# Patient Record
Sex: MALE | Race: WHITE | Employment: UNEMPLOYED | URBAN - METROPOLITAN AREA
[De-identification: names, ages, dates, MRNs, and addresses within clinical notes are randomized per-mention and may not be internally consistent; named-entity substitution may affect disease eponyms.]

---

## 2023-07-21 ENCOUNTER — OFFICE VISIT (OUTPATIENT)
Dept: PRIMARY CARE CLINIC | Age: 73
End: 2023-07-21
Payer: MEDICARE

## 2023-07-21 VITALS
RESPIRATION RATE: 16 BRPM | WEIGHT: 238.4 LBS | DIASTOLIC BLOOD PRESSURE: 72 MMHG | SYSTOLIC BLOOD PRESSURE: 124 MMHG | BODY MASS INDEX: 34.13 KG/M2 | HEIGHT: 70 IN | OXYGEN SATURATION: 97 % | HEART RATE: 68 BPM

## 2023-07-21 DIAGNOSIS — Z79.4 TYPE 2 DIABETES MELLITUS WITH STAGE 3B CHRONIC KIDNEY DISEASE, WITH LONG-TERM CURRENT USE OF INSULIN (HCC): Primary | ICD-10-CM

## 2023-07-21 DIAGNOSIS — G47.33 OBSTRUCTIVE SLEEP APNEA SYNDROME: ICD-10-CM

## 2023-07-21 DIAGNOSIS — E11.22 TYPE 2 DIABETES MELLITUS WITH STAGE 3B CHRONIC KIDNEY DISEASE, WITH LONG-TERM CURRENT USE OF INSULIN (HCC): Primary | ICD-10-CM

## 2023-07-21 DIAGNOSIS — I10 PRIMARY HYPERTENSION: ICD-10-CM

## 2023-07-21 DIAGNOSIS — J44.9 CHRONIC OBSTRUCTIVE PULMONARY DISEASE, UNSPECIFIED COPD TYPE (HCC): ICD-10-CM

## 2023-07-21 DIAGNOSIS — N18.32 TYPE 2 DIABETES MELLITUS WITH STAGE 3B CHRONIC KIDNEY DISEASE, WITH LONG-TERM CURRENT USE OF INSULIN (HCC): Primary | ICD-10-CM

## 2023-07-21 DIAGNOSIS — N18.31 STAGE 3A CHRONIC KIDNEY DISEASE (HCC): ICD-10-CM

## 2023-07-21 PROBLEM — H53.9 VISION DISORDER: Status: ACTIVE | Noted: 2023-07-21

## 2023-07-21 PROBLEM — I82.409 DEEP VEIN THROMBOSIS OF LOWER EXTREMITY (HCC): Status: RESOLVED | Noted: 2023-07-21 | Resolved: 2023-07-21

## 2023-07-21 PROBLEM — I82.409 DEEP VEIN THROMBOSIS OF LOWER EXTREMITY (HCC): Status: ACTIVE | Noted: 2023-07-21

## 2023-07-21 PROBLEM — E11.9 TYPE 2 DIABETES MELLITUS (HCC): Status: ACTIVE | Noted: 2023-07-21

## 2023-07-21 PROBLEM — M10.9 GOUT: Status: ACTIVE | Noted: 2023-07-21

## 2023-07-21 PROBLEM — M81.8 IDIOPATHIC OSTEOPOROSIS: Status: ACTIVE | Noted: 2017-06-14

## 2023-07-21 PROBLEM — F41.1 GENERALIZED ANXIETY DISORDER: Status: ACTIVE | Noted: 2023-07-21

## 2023-07-21 PROBLEM — E78.6 LIPOPROTEIN DEFICIENCY DISORDER: Status: ACTIVE | Noted: 2023-07-21

## 2023-07-21 PROBLEM — R97.20 HIGH PROSTATE SPECIFIC ANTIGEN (PSA): Status: ACTIVE | Noted: 2023-07-21

## 2023-07-21 PROBLEM — E66.9 OBESITY: Status: ACTIVE | Noted: 2023-07-21

## 2023-07-21 PROBLEM — R68.89 INTOLERANT OF HEAT: Status: ACTIVE | Noted: 2023-07-21

## 2023-07-21 PROBLEM — I26.99 PULMONARY EMBOLISM (HCC): Status: ACTIVE | Noted: 2023-07-21

## 2023-07-21 PROBLEM — M54.9 CHRONIC BACK PAIN: Status: ACTIVE | Noted: 2023-07-21

## 2023-07-21 PROBLEM — G89.29 CHRONIC BACK PAIN: Status: ACTIVE | Noted: 2023-07-21

## 2023-07-21 PROBLEM — M19.90 ARTHRITIS: Status: ACTIVE | Noted: 2023-07-21

## 2023-07-21 PROBLEM — F43.22 ADJUSTMENT DISORDER WITH ANXIOUS MOOD: Status: ACTIVE | Noted: 2023-07-21

## 2023-07-21 PROBLEM — I26.99 PULMONARY EMBOLISM (HCC): Status: RESOLVED | Noted: 2023-07-21 | Resolved: 2023-07-21

## 2023-07-21 PROBLEM — R80.9 PROTEINURIA: Status: ACTIVE | Noted: 2023-07-21

## 2023-07-21 PROBLEM — E78.1 HYPERTRIGLYCERIDEMIA: Status: ACTIVE | Noted: 2023-07-21

## 2023-07-21 PROBLEM — R91.8 ABNORMAL FINDINGS ON DIAGNOSTIC IMAGING OF LUNG: Status: ACTIVE | Noted: 2023-07-21

## 2023-07-21 PROCEDURE — G8427 DOCREV CUR MEDS BY ELIG CLIN: HCPCS | Performed by: PHYSICIAN ASSISTANT

## 2023-07-21 PROCEDURE — 99204 OFFICE O/P NEW MOD 45 MIN: CPT | Performed by: PHYSICIAN ASSISTANT

## 2023-07-21 PROCEDURE — G8417 CALC BMI ABV UP PARAM F/U: HCPCS | Performed by: PHYSICIAN ASSISTANT

## 2023-07-21 PROCEDURE — 1123F ACP DISCUSS/DSCN MKR DOCD: CPT | Performed by: PHYSICIAN ASSISTANT

## 2023-07-21 PROCEDURE — 3023F SPIROM DOC REV: CPT | Performed by: PHYSICIAN ASSISTANT

## 2023-07-21 PROCEDURE — 3046F HEMOGLOBIN A1C LEVEL >9.0%: CPT | Performed by: PHYSICIAN ASSISTANT

## 2023-07-21 PROCEDURE — 3074F SYST BP LT 130 MM HG: CPT | Performed by: PHYSICIAN ASSISTANT

## 2023-07-21 PROCEDURE — 3017F COLORECTAL CA SCREEN DOC REV: CPT | Performed by: PHYSICIAN ASSISTANT

## 2023-07-21 PROCEDURE — 3078F DIAST BP <80 MM HG: CPT | Performed by: PHYSICIAN ASSISTANT

## 2023-07-21 PROCEDURE — 2022F DILAT RTA XM EVC RTNOPTHY: CPT | Performed by: PHYSICIAN ASSISTANT

## 2023-07-21 PROCEDURE — 1036F TOBACCO NON-USER: CPT | Performed by: PHYSICIAN ASSISTANT

## 2023-07-21 RX ORDER — IRBESARTAN 300 MG/1
300 TABLET ORAL NIGHTLY
COMMUNITY

## 2023-07-21 RX ORDER — ACARBOSE 25 MG/1
50 TABLET ORAL
COMMUNITY

## 2023-07-21 RX ORDER — IRBESARTAN 300 MG/1
300 TABLET ORAL
COMMUNITY
Start: 2021-03-23 | End: 2023-07-21

## 2023-07-21 RX ORDER — INSULIN LISPRO 100 [IU]/ML
INJECTION, SOLUTION INTRAVENOUS; SUBCUTANEOUS
COMMUNITY
Start: 2021-03-23

## 2023-07-21 RX ORDER — ATORVASTATIN CALCIUM 10 MG/1
10 TABLET, FILM COATED ORAL
COMMUNITY
Start: 2018-01-29

## 2023-07-21 SDOH — ECONOMIC STABILITY: HOUSING INSECURITY
IN THE LAST 12 MONTHS, WAS THERE A TIME WHEN YOU DID NOT HAVE A STEADY PLACE TO SLEEP OR SLEPT IN A SHELTER (INCLUDING NOW)?: NO

## 2023-07-21 SDOH — ECONOMIC STABILITY: INCOME INSECURITY: HOW HARD IS IT FOR YOU TO PAY FOR THE VERY BASICS LIKE FOOD, HOUSING, MEDICAL CARE, AND HEATING?: NOT HARD AT ALL

## 2023-07-21 SDOH — ECONOMIC STABILITY: FOOD INSECURITY: WITHIN THE PAST 12 MONTHS, THE FOOD YOU BOUGHT JUST DIDN'T LAST AND YOU DIDN'T HAVE MONEY TO GET MORE.: NEVER TRUE

## 2023-07-21 SDOH — ECONOMIC STABILITY: FOOD INSECURITY: WITHIN THE PAST 12 MONTHS, YOU WORRIED THAT YOUR FOOD WOULD RUN OUT BEFORE YOU GOT MONEY TO BUY MORE.: NEVER TRUE

## 2023-07-21 ASSESSMENT — PATIENT HEALTH QUESTIONNAIRE - PHQ9
2. FEELING DOWN, DEPRESSED OR HOPELESS: 0
SUM OF ALL RESPONSES TO PHQ QUESTIONS 1-9: 0
SUM OF ALL RESPONSES TO PHQ QUESTIONS 1-9: 0
1. LITTLE INTEREST OR PLEASURE IN DOING THINGS: 0
SUM OF ALL RESPONSES TO PHQ QUESTIONS 1-9: 0
SUM OF ALL RESPONSES TO PHQ QUESTIONS 1-9: 0
SUM OF ALL RESPONSES TO PHQ9 QUESTIONS 1 & 2: 0

## 2023-07-21 NOTE — PROGRESS NOTES
[address] [facility] PRIMARY CARE  [address] [place] [address]  Dept: [phone]  Dept Fax: [phone]    Samuel Muonz is a 74 y.o. male who presents today for his medical conditions/complaints as noted below. Chief Complaint   Patient presents with    Establish Care     History of gallbladder removal, history of kidney donation, having back pain that causes pain in arms when moving them; he is seeing Dr. Angela Olvera for sleep medicine, seeing Dr. Gianna Winslow        HPI:     Patient presents to the office to establish care. Prior patient at [facility]. He has past medical history including type 2 diabetes, chronic kidney disease, kidney donation, hypertension, LAWANDA, COPD, chronic back pain. Patient reports he is currently using 60 units of Levemir daily with 20 units of Humalog per meal.  Denies any hypoglycemic events. No lightheadedness or dizziness. For the most part sugars in the 100s or 200s. He is also taking glimepiride and acarbose. He has seen endocrinology in the past and would like to hold off on going back. Patient does follow with pulmonology for management of COPD. He is not currently on any inhalers. Denies any breathing issues at this time. Patient has chronic obstructive sleep apnea. He is compliant with CPAP device. He follows with Dr. Danielle Shaw. He does mention intermittent back pain throughout his life. Not currently bothering him enough to the point where he would like physical therapy or pain management. He is managing with over-the-counter remedies. No other concerns or complaints. BP stable.       No results found for: LABA1C          ( goal A1C is < 7)   No components found for: LABMICR  No results found for: LDLCHOLESTEROL, LDLCALC    (goal LDL is <100)   No results found for: AST, ALT, BUN, CR  BP Readings from Last 3 Encounters:   07/21/23 124/72   04/06/16 151/75   06/30/14 120/76          (goal 120/80)    Past

## 2023-08-01 ASSESSMENT — ENCOUNTER SYMPTOMS
BACK PAIN: 0
EYE PAIN: 0
VOMITING: 0
NAUSEA: 0
ABDOMINAL PAIN: 0
CONSTIPATION: 0
RHINORRHEA: 0
SHORTNESS OF BREATH: 0
DIARRHEA: 0
COUGH: 0
SINUS PAIN: 0

## 2023-09-01 ENCOUNTER — TELEPHONE (OUTPATIENT)
Dept: PRIMARY CARE CLINIC | Age: 73
End: 2023-09-01

## 2023-09-01 NOTE — TELEPHONE ENCOUNTER
Patient called in this afternoon inquiring on why it say on his paper work that he has stage 3 kidney disease. Please advise.

## 2023-09-01 NOTE — TELEPHONE ENCOUNTER
----- Message from Calebadarsh Munoz sent at 9/1/2023  3:03 PM EDT -----  Subject: Message to Provider    QUESTIONS  Information for Provider? Patient wants to know if he needs to fast for   bloodwork. When can he get his blood drawn? He also has questions about   his paperwork from his last visit about his kidneys. ---------------------------------------------------------------------------  --------------  Irma HENNESSY  [phone]; OK to leave message on voicemail  ---------------------------------------------------------------------------  --------------  SCRIPT ANSWERS  Relationship to Patient?  Self

## 2023-09-01 NOTE — TELEPHONE ENCOUNTER
His last creatinine was elevated. For his labs please make sure he is fasting. This will check his kidneys and see if there is any decrease in function.

## 2023-09-01 NOTE — TELEPHONE ENCOUNTER
----- Message from Bonny Joy sent at 9/1/2023  3:03 PM EDT -----  Subject: Message to Provider    QUESTIONS  Information for Provider? Patient wants to know if he needs to fast for   bloodwork. When can he get his blood drawn? He also has questions about   his paperwork from his last visit about his kidneys. ---------------------------------------------------------------------------  --------------  Dora RODRIGUEZZL  [phone]; OK to leave message on voicemail  ---------------------------------------------------------------------------  --------------  SCRIPT ANSWERS  Relationship to Patient?  Self

## 2023-09-01 NOTE — TELEPHONE ENCOUNTER
Patient did call back. Was able to answer some of his questions.  But what couldn't was sent to [facility] for answering

## 2023-09-01 NOTE — TELEPHONE ENCOUNTER
Pt notified by phone and thought his specialist was watching this and never heard was bad for the last time he had it checked

## 2023-10-09 ENCOUNTER — OFFICE VISIT (OUTPATIENT)
Dept: PRIMARY CARE CLINIC | Age: 73
End: 2023-10-09
Payer: MEDICARE

## 2023-10-09 VITALS
HEART RATE: 98 BPM | WEIGHT: 240.6 LBS | OXYGEN SATURATION: 97 % | SYSTOLIC BLOOD PRESSURE: 138 MMHG | DIASTOLIC BLOOD PRESSURE: 88 MMHG | BODY MASS INDEX: 34.44 KG/M2 | HEIGHT: 70 IN

## 2023-10-09 DIAGNOSIS — L89.899 PRESSURE INJURY OF SKIN OF LEFT FOOT, UNSPECIFIED INJURY STAGE: Primary | ICD-10-CM

## 2023-10-09 DIAGNOSIS — Z79.4 TYPE 2 DIABETES MELLITUS WITH STAGE 3B CHRONIC KIDNEY DISEASE, WITH LONG-TERM CURRENT USE OF INSULIN (HCC): ICD-10-CM

## 2023-10-09 DIAGNOSIS — L03.032 CELLULITIS OF TOE OF LEFT FOOT: ICD-10-CM

## 2023-10-09 DIAGNOSIS — N18.32 TYPE 2 DIABETES MELLITUS WITH STAGE 3B CHRONIC KIDNEY DISEASE, WITH LONG-TERM CURRENT USE OF INSULIN (HCC): ICD-10-CM

## 2023-10-09 DIAGNOSIS — E11.22 TYPE 2 DIABETES MELLITUS WITH STAGE 3B CHRONIC KIDNEY DISEASE, WITH LONG-TERM CURRENT USE OF INSULIN (HCC): ICD-10-CM

## 2023-10-09 LAB — HBA1C MFR BLD: 8 %

## 2023-10-09 PROCEDURE — G8484 FLU IMMUNIZE NO ADMIN: HCPCS | Performed by: FAMILY MEDICINE

## 2023-10-09 PROCEDURE — 3052F HG A1C>EQUAL 8.0%<EQUAL 9.0%: CPT | Performed by: FAMILY MEDICINE

## 2023-10-09 PROCEDURE — 10060 I&D ABSCESS SIMPLE/SINGLE: CPT | Performed by: FAMILY MEDICINE

## 2023-10-09 PROCEDURE — 2022F DILAT RTA XM EVC RTNOPTHY: CPT | Performed by: FAMILY MEDICINE

## 2023-10-09 PROCEDURE — G8427 DOCREV CUR MEDS BY ELIG CLIN: HCPCS | Performed by: FAMILY MEDICINE

## 2023-10-09 PROCEDURE — 1123F ACP DISCUSS/DSCN MKR DOCD: CPT | Performed by: FAMILY MEDICINE

## 2023-10-09 PROCEDURE — G8417 CALC BMI ABV UP PARAM F/U: HCPCS | Performed by: FAMILY MEDICINE

## 2023-10-09 PROCEDURE — 1036F TOBACCO NON-USER: CPT | Performed by: FAMILY MEDICINE

## 2023-10-09 PROCEDURE — 3075F SYST BP GE 130 - 139MM HG: CPT | Performed by: FAMILY MEDICINE

## 2023-10-09 PROCEDURE — 99204 OFFICE O/P NEW MOD 45 MIN: CPT | Performed by: FAMILY MEDICINE

## 2023-10-09 PROCEDURE — 3079F DIAST BP 80-89 MM HG: CPT | Performed by: FAMILY MEDICINE

## 2023-10-09 PROCEDURE — 3017F COLORECTAL CA SCREEN DOC REV: CPT | Performed by: FAMILY MEDICINE

## 2023-10-09 PROCEDURE — 83036 HEMOGLOBIN GLYCOSYLATED A1C: CPT | Performed by: FAMILY MEDICINE

## 2023-10-09 RX ORDER — AMOXICILLIN AND CLAVULANATE POTASSIUM 875; 125 MG/1; MG/1
1 TABLET, FILM COATED ORAL 2 TIMES DAILY
Qty: 14 TABLET | Refills: 0 | Status: SHIPPED | OUTPATIENT
Start: 2023-10-09 | End: 2023-10-16

## 2023-10-09 RX ORDER — ACARBOSE 50 MG/1
TABLET ORAL
COMMUNITY
Start: 2023-08-15

## 2023-10-09 ASSESSMENT — PATIENT HEALTH QUESTIONNAIRE - PHQ9
SUM OF ALL RESPONSES TO PHQ QUESTIONS 1-9: 0
SUM OF ALL RESPONSES TO PHQ QUESTIONS 1-9: 0
1. LITTLE INTEREST OR PLEASURE IN DOING THINGS: 0
SUM OF ALL RESPONSES TO PHQ QUESTIONS 1-9: 0
SUM OF ALL RESPONSES TO PHQ9 QUESTIONS 1 & 2: 0
SUM OF ALL RESPONSES TO PHQ QUESTIONS 1-9: 0
2. FEELING DOWN, DEPRESSED OR HOPELESS: 0

## 2023-10-09 ASSESSMENT — ENCOUNTER SYMPTOMS
SORE THROAT: 0
SHORTNESS OF BREATH: 0
ABDOMINAL PAIN: 0
COLOR CHANGE: 1
WHEEZING: 0

## 2023-10-09 NOTE — PATIENT INSTRUCTIONS
Soak your feet in vibrating foot bath ideally warm soapy water twice a day pat dry apply the antibiotic ointment that I gave you. Keep the wound covered with sterile bandage that we gave you as well. Take the antibiotic until it is totally empty    I will call you with results of the x-ray    I want you to follow-up next Monday with me in the office for recheck     Recommend diabetic shoes.   Be very careful of your foot wear

## 2023-10-09 NOTE — PROGRESS NOTES
Subjective:     Patient ID: Cadence Vegas is a 74 y.o. male    HPI  Patient presents today for blister noticed on his left foot fifth toe. Swollen gotten darker and more noticeable. No known injury to the foot. Has no pain associated with it. Is a known diabetic has not had a recent A1c. Not aware of any diabetic neuropathy or loss of foot sensation in the past.  Has been wearing the same shoes has not increased his activity recently. Never had a foot ulcer before. Reviewed his med list and has been compliant. He had previously seen following [facility] and reports that his A1c's have been in the eights in the past.  He has been on insulin and glimepiride. Oli Carr saw him here to establish as a new patient and the only labs that he has not had done yet. Review of Systems   Constitutional:  Negative for fever. HENT:  Negative for congestion, ear pain and sore throat. Respiratory:  Negative for shortness of breath and wheezing. Cardiovascular:  Negative for chest pain and leg swelling. Gastrointestinal:  Negative for abdominal pain. Genitourinary:  Negative for dysuria. Skin:  Positive for color change, rash and wound. Neurological:  Negative for syncope. Hematological:  Negative for adenopathy. Psychiatric/Behavioral:  Negative for sleep disturbance. The patient is not nervous/anxious. Objective:     Physical Exam  Vitals and nursing note reviewed. Exam conducted with a chaperone present. Constitutional:       General: He is not in acute distress. Appearance: Normal appearance. He is obese. He is not ill-appearing, toxic-appearing or diaphoretic. Musculoskeletal:        Feet:    Feet:      Comments: Cystic lesion on the left fifth toe. Appears to be fluid-filled. Not tender to touch. Flesh-colored. Underneath this lesion appears to have old healed diabetic ulcer. Rest of the skin on the foot looks good.     Palpation of the area does not produce any

## 2023-10-12 ENCOUNTER — OFFICE VISIT (OUTPATIENT)
Dept: PRIMARY CARE CLINIC | Age: 73
End: 2023-10-12
Payer: MEDICARE

## 2023-10-12 VITALS
BODY MASS INDEX: 34.07 KG/M2 | TEMPERATURE: 97.5 F | DIASTOLIC BLOOD PRESSURE: 60 MMHG | WEIGHT: 238 LBS | HEIGHT: 70 IN | HEART RATE: 101 BPM | OXYGEN SATURATION: 97 % | SYSTOLIC BLOOD PRESSURE: 124 MMHG

## 2023-10-12 DIAGNOSIS — L03.032 CELLULITIS OF TOE OF LEFT FOOT: ICD-10-CM

## 2023-10-12 DIAGNOSIS — L89.899 PRESSURE INJURY OF SKIN OF LEFT FOOT, UNSPECIFIED INJURY STAGE: Primary | ICD-10-CM

## 2023-10-12 PROCEDURE — G8417 CALC BMI ABV UP PARAM F/U: HCPCS | Performed by: NURSE PRACTITIONER

## 2023-10-12 PROCEDURE — 1123F ACP DISCUSS/DSCN MKR DOCD: CPT | Performed by: NURSE PRACTITIONER

## 2023-10-12 PROCEDURE — 3078F DIAST BP <80 MM HG: CPT | Performed by: NURSE PRACTITIONER

## 2023-10-12 PROCEDURE — 3074F SYST BP LT 130 MM HG: CPT | Performed by: NURSE PRACTITIONER

## 2023-10-12 PROCEDURE — 1036F TOBACCO NON-USER: CPT | Performed by: NURSE PRACTITIONER

## 2023-10-12 PROCEDURE — 99213 OFFICE O/P EST LOW 20 MIN: CPT | Performed by: NURSE PRACTITIONER

## 2023-10-12 PROCEDURE — 3017F COLORECTAL CA SCREEN DOC REV: CPT | Performed by: NURSE PRACTITIONER

## 2023-10-12 PROCEDURE — G8427 DOCREV CUR MEDS BY ELIG CLIN: HCPCS | Performed by: NURSE PRACTITIONER

## 2023-10-12 PROCEDURE — G8484 FLU IMMUNIZE NO ADMIN: HCPCS | Performed by: NURSE PRACTITIONER

## 2023-10-12 ASSESSMENT — ENCOUNTER SYMPTOMS
COUGH: 0
SHORTNESS OF BREATH: 0
VOMITING: 0
ABDOMINAL PAIN: 0

## 2023-10-12 NOTE — PROGRESS NOTES
effort is normal. No respiratory distress. Musculoskeletal:      Left foot: Bunion present. Feet:    Feet:      Left foot:      Skin integrity: Skin breakdown, callus and dry skin present. No warmth. Skin:     General: Skin is warm and dry. Neurological:      Mental Status: He is alert and oriented to person, place, and time. Psychiatric:         Mood and Affect: Mood normal.         Behavior: Behavior normal.           :          1. Pressure injury of skin of left foot, unspecified injury stage  2. Cellulitis of toe of left foot       Area does not look infected today. Estephania Johnson MA was present for exam and procedure on Monday with Dr. Rafat Byrne. She thinks patients foot looks better today than on Monday. Will continue to monitor. Triple antibiotic ointment and non-adherent dressing with coban applied to his left foot. He tolerated well. Continue topical and oral antibiotics as prescribed. Follow up on Monday as planned. Call office with concerns.   :      Return if symptoms worsen or fail to improve. No orders of the defined types were placed in this encounter. Patient and/or parent given educational materials - see patient instructions. Discussed use, benefit, and side effects of prescribed medications. All patient questions answered. Patient and/or parent voiced understanding.       Electronically signed by JAGJIT Flores 10/12/2023 at 11:36 AM

## 2023-10-16 ENCOUNTER — OFFICE VISIT (OUTPATIENT)
Dept: PRIMARY CARE CLINIC | Age: 73
End: 2023-10-16
Payer: MEDICARE

## 2023-10-16 VITALS
HEIGHT: 70 IN | SYSTOLIC BLOOD PRESSURE: 124 MMHG | HEART RATE: 102 BPM | WEIGHT: 238 LBS | BODY MASS INDEX: 34.07 KG/M2 | DIASTOLIC BLOOD PRESSURE: 68 MMHG | OXYGEN SATURATION: 98 %

## 2023-10-16 DIAGNOSIS — L03.032 CELLULITIS OF TOE OF LEFT FOOT: Primary | ICD-10-CM

## 2023-10-16 DIAGNOSIS — E11.22 TYPE 2 DIABETES MELLITUS WITH STAGE 3B CHRONIC KIDNEY DISEASE, WITH LONG-TERM CURRENT USE OF INSULIN (HCC): ICD-10-CM

## 2023-10-16 DIAGNOSIS — L89.899 PRESSURE INJURY OF SKIN OF LEFT FOOT, UNSPECIFIED INJURY STAGE: ICD-10-CM

## 2023-10-16 DIAGNOSIS — Z79.4 TYPE 2 DIABETES MELLITUS WITH STAGE 3B CHRONIC KIDNEY DISEASE, WITH LONG-TERM CURRENT USE OF INSULIN (HCC): ICD-10-CM

## 2023-10-16 DIAGNOSIS — N18.32 TYPE 2 DIABETES MELLITUS WITH STAGE 3B CHRONIC KIDNEY DISEASE, WITH LONG-TERM CURRENT USE OF INSULIN (HCC): ICD-10-CM

## 2023-10-16 PROCEDURE — 3017F COLORECTAL CA SCREEN DOC REV: CPT | Performed by: FAMILY MEDICINE

## 2023-10-16 PROCEDURE — G8417 CALC BMI ABV UP PARAM F/U: HCPCS | Performed by: FAMILY MEDICINE

## 2023-10-16 PROCEDURE — 2022F DILAT RTA XM EVC RTNOPTHY: CPT | Performed by: FAMILY MEDICINE

## 2023-10-16 PROCEDURE — G8484 FLU IMMUNIZE NO ADMIN: HCPCS | Performed by: FAMILY MEDICINE

## 2023-10-16 PROCEDURE — G8427 DOCREV CUR MEDS BY ELIG CLIN: HCPCS | Performed by: FAMILY MEDICINE

## 2023-10-16 PROCEDURE — 3074F SYST BP LT 130 MM HG: CPT | Performed by: FAMILY MEDICINE

## 2023-10-16 PROCEDURE — 99213 OFFICE O/P EST LOW 20 MIN: CPT | Performed by: FAMILY MEDICINE

## 2023-10-16 PROCEDURE — 3078F DIAST BP <80 MM HG: CPT | Performed by: FAMILY MEDICINE

## 2023-10-16 PROCEDURE — 1036F TOBACCO NON-USER: CPT | Performed by: FAMILY MEDICINE

## 2023-10-16 PROCEDURE — 1123F ACP DISCUSS/DSCN MKR DOCD: CPT | Performed by: FAMILY MEDICINE

## 2023-10-16 PROCEDURE — 3052F HG A1C>EQUAL 8.0%<EQUAL 9.0%: CPT | Performed by: FAMILY MEDICINE

## 2023-10-16 ASSESSMENT — ENCOUNTER SYMPTOMS
WHEEZING: 0
SHORTNESS OF BREATH: 0
SORE THROAT: 0
COLOR CHANGE: 1
ABDOMINAL PAIN: 0

## 2023-10-16 NOTE — PROGRESS NOTES
diabetic ulcer. Rest of the skin on the foot looks good. Palpation of the area does not produce any pain whatsoever. Cannot really tell if he has any bony involvement of infection. Lymphadenopathy:      Cervical: No cervical adenopathy. Skin:     Comments: Pressure ulcer diabetic with hyperkeratotic reaction left fifth toe. Also has a similar lesion on the right fifth toe but not as pronounced and smaller. Area is really nontender  No foul discharge at this point in time    Refer to podiatry for debridement   Neurological:      Mental Status: He is alert. Xray Result (most recent):  XR FOOT LEFT (MIN 3 VIEWS) 10/09/2023    Narrative  EXAMINATION:  THREE XRAY VIEWS OF THE LEFT FOOT    10/9/2023 12:24 pm    COMPARISON:  None. HISTORY:  ORDERING SYSTEM PROVIDED HISTORY: Pressure injury of skin of left foot,  unspecified injury stage  TECHNOLOGIST PROVIDED HISTORY:  Reason for Exam: Pt has a blister on foot that is concerning, is a diabetic. FINDINGS:  No acute osseous injury is identified. There is focal soft tissue swelling  lateral to the 5th MTP joint with suggestion of soft tissue gas, suspicious  for infectious process. 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 The remaining visible joint spaces appear normal.  Plantar  calcaneal enthesophytes are noted. Impression  There is focal soft tissue swelling lateral to the 5th MTP joint with  suggestion of soft tissue gas, suspicious for infectious process, without  evidence of osteomyelitis. Assessment/Plan:     1. Cellulitis of toe of left foot    2. Pressure injury of skin of left foot, unspecified injury stage    3. Type 2 diabetes mellitus with stage 3b chronic kidney disease, with long-term current use of insulin ([facility])          David Recio was seen today for foot ulcer.     Diagnoses and all orders for this visit:    Cellulitis of toe of left foot  -     Terry Nino DPM, Podiatry, [place]    Pressure

## 2023-10-23 ENCOUNTER — OFFICE VISIT (OUTPATIENT)
Dept: PODIATRY | Age: 73
End: 2023-10-23
Payer: MEDICARE

## 2023-10-23 VITALS — HEIGHT: 70 IN | WEIGHT: 238 LBS | BODY MASS INDEX: 34.07 KG/M2

## 2023-10-23 DIAGNOSIS — L97.523 ULCER OF LEFT FOOT, WITH NECROSIS OF MUSCLE (HCC): Primary | ICD-10-CM

## 2023-10-23 DIAGNOSIS — L97.509 DIABETIC FOOT ULCER WITH OSTEOMYELITIS (HCC): ICD-10-CM

## 2023-10-23 DIAGNOSIS — E11.621 DIABETIC FOOT ULCER WITH OSTEOMYELITIS (HCC): ICD-10-CM

## 2023-10-23 DIAGNOSIS — E11.69 DIABETIC FOOT ULCER WITH OSTEOMYELITIS (HCC): ICD-10-CM

## 2023-10-23 DIAGNOSIS — M86.9 DIABETIC FOOT ULCER WITH OSTEOMYELITIS (HCC): ICD-10-CM

## 2023-10-23 DIAGNOSIS — M79.671 PAIN IN BOTH FEET: ICD-10-CM

## 2023-10-23 DIAGNOSIS — I73.9 PVD (PERIPHERAL VASCULAR DISEASE) (HCC): ICD-10-CM

## 2023-10-23 DIAGNOSIS — M79.672 PAIN IN BOTH FEET: ICD-10-CM

## 2023-10-23 PROCEDURE — G8484 FLU IMMUNIZE NO ADMIN: HCPCS | Performed by: PODIATRIST

## 2023-10-23 PROCEDURE — 11043 DBRDMT MUSC&/FSCA 1ST 20/<: CPT | Performed by: PODIATRIST

## 2023-10-23 PROCEDURE — 1123F ACP DISCUSS/DSCN MKR DOCD: CPT | Performed by: PODIATRIST

## 2023-10-23 PROCEDURE — 1036F TOBACCO NON-USER: CPT | Performed by: PODIATRIST

## 2023-10-23 PROCEDURE — 3017F COLORECTAL CA SCREEN DOC REV: CPT | Performed by: PODIATRIST

## 2023-10-23 PROCEDURE — G8417 CALC BMI ABV UP PARAM F/U: HCPCS | Performed by: PODIATRIST

## 2023-10-23 PROCEDURE — 2022F DILAT RTA XM EVC RTNOPTHY: CPT | Performed by: PODIATRIST

## 2023-10-23 PROCEDURE — 3052F HG A1C>EQUAL 8.0%<EQUAL 9.0%: CPT | Performed by: PODIATRIST

## 2023-10-23 PROCEDURE — G8427 DOCREV CUR MEDS BY ELIG CLIN: HCPCS | Performed by: PODIATRIST

## 2023-10-23 PROCEDURE — 99204 OFFICE O/P NEW MOD 45 MIN: CPT | Performed by: PODIATRIST

## 2023-10-23 NOTE — PROGRESS NOTES
[address] [facility]  Dept: [phone]    NEW PATIENT PROGRESS NOTE  Date of patient's visit: 10/23/2023  Patient's Name:  Edda Flaherty YOB: 1950            Patient Care Team:  Kar Drew as PCP - General (Physician Assistant)  Kar Drew as PCP - Empaneled Provider        Chief Complaint   Patient presents with    New Patient     Establish care      Diabetes     Diabetic foot care    Wound Check     Left foot x 2 weeks. Pt states it started out as a blister         HPI:   Edda Flaherty is a 74 y.o. male who presents to the office today complaining of wound check left foot. Symptoms began 2 week(s) ago. Patient relates pain is Absent . Pain is rated 2 out of 10 and is described as none. Treatments prior to today's visit include: x-rays, follow up with pcp. Currently denies F/C/N/V. Pt's primary care physician is Aurelia Ruff last seen October 16 2023     No Known Allergies    Past Medical History:   Diagnosis Date    BOOP (bronchiolitis obliterans with organizing pneumonia) ([address]) 04/2014    SUSPECTED CAUSE GROUND MOLD OR POLLEN SPORES, O2  AS NEEDED AT HOME    Deep vein thrombosis of lower extremity ([address]) 7/21/2023    Difficult intravenous access     STATES HAND TH E BEST PLACE TO START    Hx of blood clots 2014    BILAT LEGS AND LUNG TREATED WITH COUMADIN    HX OTHER MEDICAL     BOOP Bronchial oblierate pneumonia    Hypertension     IN PAST RESOLVED NOW    Obesity     On supplemental oxygen therapy     O2 3L AS NEEDED AT HOME STATES NOT USING OFTEN    Pulmonary embolism ([address]) 7/21/2023    Sleep apnea 2014    USES C-PAP NIGHTLY-INSTRUCTED TO BRING    Type II or unspecified type diabetes mellitus without mention of complication, not stated as uncontrolled 2011    Wears glasses        Prior to Admission medications    Medication Sig Start Date End Date Taking?

## 2023-10-30 ENCOUNTER — OFFICE VISIT (OUTPATIENT)
Dept: PODIATRY | Age: 73
End: 2023-10-30
Payer: MEDICARE

## 2023-10-30 VITALS — HEIGHT: 70 IN | WEIGHT: 238 LBS | BODY MASS INDEX: 34.07 KG/M2

## 2023-10-30 DIAGNOSIS — E11.69 DIABETIC FOOT ULCER WITH OSTEOMYELITIS (HCC): ICD-10-CM

## 2023-10-30 DIAGNOSIS — L97.523 ULCER OF LEFT FOOT, WITH NECROSIS OF MUSCLE (HCC): Primary | ICD-10-CM

## 2023-10-30 DIAGNOSIS — M86.9 DIABETIC FOOT ULCER WITH OSTEOMYELITIS (HCC): ICD-10-CM

## 2023-10-30 DIAGNOSIS — I73.9 PVD (PERIPHERAL VASCULAR DISEASE) (HCC): ICD-10-CM

## 2023-10-30 DIAGNOSIS — E11.621 DIABETIC FOOT ULCER WITH OSTEOMYELITIS (HCC): ICD-10-CM

## 2023-10-30 DIAGNOSIS — L97.509 DIABETIC FOOT ULCER WITH OSTEOMYELITIS (HCC): ICD-10-CM

## 2023-10-30 PROCEDURE — 99999 PR OFFICE/OUTPT VISIT,PROCEDURE ONLY: CPT | Performed by: PODIATRIST

## 2023-10-30 PROCEDURE — 11043 DBRDMT MUSC&/FSCA 1ST 20/<: CPT | Performed by: PODIATRIST

## 2023-10-30 NOTE — PROGRESS NOTES
Also debrided was all  [] fibrin, [] biofilm, [] slough,  [x] necrotic,  [x] hypergranulation tissue, and/or  [x] hyperkeratotic tissue. Wound was copiously irrigated with normal saline solution. Bleeding:  Minimal.  Hemostasis:  pressure     100%  of wound debrided. Patient tolerated procedure well. Pain 0 / 10 during procedure and pain 0 / 10 after procedure. Discussed appropriate home care of this wound. Wound redressed. Patient instructions were given. Dispensed dressing supplies and instructions on their use. .  Verbal and written instructions given to patient. Contact office with any questions/problems/concerns. RTC in 1week(s)    Rx provided for bactroban. Pt to use DSD to the wound area daily and dressigns were dispensed tot he patient today . Verbal and written instructions given to patient. Contact office with any questions/problems/concerns. No orders of the defined types were placed in this encounter. No orders of the defined types were placed in this encounter. RTC in 2week(s)  .     10/30/2023      Electronically signed by Donald Thompson DPM on 10/30/2023 at 1:47 PM  10/30/2023

## 2023-11-03 ENCOUNTER — HOSPITAL ENCOUNTER (OUTPATIENT)
Age: 73
Setting detail: SPECIMEN
Discharge: HOME | End: 2023-11-03

## 2023-11-03 DIAGNOSIS — E11.22 TYPE 2 DIABETES MELLITUS WITH STAGE 3B CHRONIC KIDNEY DISEASE, WITH LONG-TERM CURRENT USE OF INSULIN (HCC): ICD-10-CM

## 2023-11-03 DIAGNOSIS — I10 PRIMARY HYPERTENSION: ICD-10-CM

## 2023-11-03 DIAGNOSIS — N18.32 TYPE 2 DIABETES MELLITUS WITH STAGE 3B CHRONIC KIDNEY DISEASE, WITH LONG-TERM CURRENT USE OF INSULIN (HCC): ICD-10-CM

## 2023-11-03 DIAGNOSIS — Z79.4 TYPE 2 DIABETES MELLITUS WITH STAGE 3B CHRONIC KIDNEY DISEASE, WITH LONG-TERM CURRENT USE OF INSULIN (HCC): ICD-10-CM

## 2023-11-03 DIAGNOSIS — J44.9 CHRONIC OBSTRUCTIVE PULMONARY DISEASE, UNSPECIFIED COPD TYPE (HCC): ICD-10-CM

## 2023-11-03 LAB
ALBUMIN SERPL-MCNC: 4.3 G/DL (ref 3.5–5.2)
ALBUMIN/GLOB SERPL: 1.3 {RATIO} (ref 1–2.5)
ALP SERPL-CCNC: 105 U/L (ref 40–129)
ALT SERPL-CCNC: 34 U/L (ref 5–41)
ANION GAP SERPL CALCULATED.3IONS-SCNC: 14 MMOL/L (ref 9–17)
AST SERPL-CCNC: 23 U/L
BILIRUB SERPL-MCNC: 0.3 MG/DL (ref 0.3–1.2)
BUN SERPL-MCNC: 20 MG/DL (ref 8–23)
CALCIUM SERPL-MCNC: 9.6 MG/DL (ref 8.6–10.4)
CHLORIDE SERPL-SCNC: 100 MMOL/L (ref 98–107)
CHOLEST SERPL-MCNC: 143 MG/DL
CHOLESTEROL/HDL RATIO: 4.3
CO2 SERPL-SCNC: 23 MMOL/L (ref 20–31)
CREAT SERPL-MCNC: 1.4 MG/DL (ref 0.7–1.2)
CREAT UR-MCNC: 148 MG/DL (ref 39–259)
ERYTHROCYTE [DISTWIDTH] IN BLOOD BY AUTOMATED COUNT: 15.1 % (ref 11.8–14.4)
GFR SERPL CREATININE-BSD FRML MDRD: 53 ML/MIN/1.73M2
GLUCOSE SERPL-MCNC: 363 MG/DL (ref 70–99)
HCT VFR BLD AUTO: 40.7 % (ref 40.7–50.3)
HDLC SERPL-MCNC: 33 MG/DL
HGB BLD-MCNC: 13.5 G/DL (ref 13–17)
LDLC SERPL CALC-MCNC: 81 MG/DL (ref 0–130)
MCH RBC QN AUTO: 33 PG (ref 25.2–33.5)
MCHC RBC AUTO-ENTMCNC: 33.2 G/DL (ref 28.4–34.8)
MCV RBC AUTO: 99.5 FL (ref 82.6–102.9)
MICROALBUMIN UR-MCNC: 53 MG/L
MICROALBUMIN/CREAT UR-RTO: 36 MCG/MG CREAT
NRBC BLD-RTO: 0 PER 100 WBC
PLATELET # BLD AUTO: 268 K/UL (ref 138–453)
PMV BLD AUTO: 9.5 FL (ref 8.1–13.5)
POTASSIUM SERPL-SCNC: 5 MMOL/L (ref 3.7–5.3)
PROT SERPL-MCNC: 7.7 G/DL (ref 6.4–8.3)
RBC # BLD AUTO: 4.09 M/UL (ref 4.21–5.77)
SODIUM SERPL-SCNC: 137 MMOL/L (ref 135–144)
TRIGL SERPL-MCNC: 144 MG/DL
WBC OTHER # BLD: 6.9 K/UL (ref 3.5–11.3)

## 2023-11-06 ENCOUNTER — TELEPHONE (OUTPATIENT)
Dept: PRIMARY CARE CLINIC | Age: 73
End: 2023-11-06

## 2023-11-06 NOTE — TELEPHONE ENCOUNTER
Writer tried to apple patient to update him on lab results. No answer. Left message for patient to call back.

## 2023-11-08 ENCOUNTER — OFFICE VISIT (OUTPATIENT)
Dept: PRIMARY CARE CLINIC | Age: 73
End: 2023-11-08
Payer: MEDICARE

## 2023-11-08 VITALS
HEIGHT: 70 IN | WEIGHT: 238.4 LBS | RESPIRATION RATE: 16 BRPM | BODY MASS INDEX: 34.13 KG/M2 | SYSTOLIC BLOOD PRESSURE: 126 MMHG | DIASTOLIC BLOOD PRESSURE: 68 MMHG | OXYGEN SATURATION: 98 % | HEART RATE: 106 BPM

## 2023-11-08 DIAGNOSIS — Z79.4 TYPE 2 DIABETES MELLITUS WITH STAGE 3B CHRONIC KIDNEY DISEASE, WITH LONG-TERM CURRENT USE OF INSULIN (HCC): ICD-10-CM

## 2023-11-08 DIAGNOSIS — Z23 NEED FOR INFLUENZA VACCINATION: ICD-10-CM

## 2023-11-08 DIAGNOSIS — N18.32 TYPE 2 DIABETES MELLITUS WITH STAGE 3B CHRONIC KIDNEY DISEASE, WITH LONG-TERM CURRENT USE OF INSULIN (HCC): ICD-10-CM

## 2023-11-08 DIAGNOSIS — E11.22 TYPE 2 DIABETES MELLITUS WITH STAGE 3B CHRONIC KIDNEY DISEASE, WITH LONG-TERM CURRENT USE OF INSULIN (HCC): ICD-10-CM

## 2023-11-08 DIAGNOSIS — Z00.00 MEDICARE ANNUAL WELLNESS VISIT, SUBSEQUENT: Primary | ICD-10-CM

## 2023-11-08 PROCEDURE — 90694 VACC AIIV4 NO PRSRV 0.5ML IM: CPT | Performed by: PHYSICIAN ASSISTANT

## 2023-11-08 PROCEDURE — 3052F HG A1C>EQUAL 8.0%<EQUAL 9.0%: CPT | Performed by: PHYSICIAN ASSISTANT

## 2023-11-08 PROCEDURE — 1123F ACP DISCUSS/DSCN MKR DOCD: CPT | Performed by: PHYSICIAN ASSISTANT

## 2023-11-08 PROCEDURE — G0439 PPPS, SUBSEQ VISIT: HCPCS | Performed by: PHYSICIAN ASSISTANT

## 2023-11-08 PROCEDURE — 3017F COLORECTAL CA SCREEN DOC REV: CPT | Performed by: PHYSICIAN ASSISTANT

## 2023-11-08 PROCEDURE — G8484 FLU IMMUNIZE NO ADMIN: HCPCS | Performed by: PHYSICIAN ASSISTANT

## 2023-11-08 PROCEDURE — G0008 ADMIN INFLUENZA VIRUS VAC: HCPCS | Performed by: PHYSICIAN ASSISTANT

## 2023-11-08 PROCEDURE — 3074F SYST BP LT 130 MM HG: CPT | Performed by: PHYSICIAN ASSISTANT

## 2023-11-08 PROCEDURE — 3078F DIAST BP <80 MM HG: CPT | Performed by: PHYSICIAN ASSISTANT

## 2023-11-08 ASSESSMENT — PATIENT HEALTH QUESTIONNAIRE - PHQ9
SUM OF ALL RESPONSES TO PHQ QUESTIONS 1-9: 0
1. LITTLE INTEREST OR PLEASURE IN DOING THINGS: 0
SUM OF ALL RESPONSES TO PHQ9 QUESTIONS 1 & 2: 0
SUM OF ALL RESPONSES TO PHQ QUESTIONS 1-9: 0
2. FEELING DOWN, DEPRESSED OR HOPELESS: 0

## 2023-11-08 NOTE — PROGRESS NOTES
Medicare Annual Wellness Visit    Jae Estevez is here for Medicare AWV    Assessment & Plan   Medicare annual wellness visit, subsequent  Need for influenza vaccination  -     Influenza, FLUAD, (age 72 y+), IM, Preservative Free, 0.5 mL  Type 2 diabetes mellitus with stage 3b chronic kidney disease, with long-term current use of insulin (HCC)  -     Dulaglutide 0.75 MG/0.5ML SOPN; Inject 0.75 mg into the skin once a week, Disp-4 Adjustable Dose Pre-filled Pen Syringe, R-0Normal    We reviewed Medicare questionnaire and responses. He will continue following with podiatry, optometry for additional diabetic management. Continue following with dentistry. He is agreeable to flu shot. Otherwise declines any health maintenance. We reviewed diabetic management and updated plan. He is to call the office with any changes or concerns. Recommendations for Preventive Services Due: see orders and patient instructions/AVS.  Recommended screening schedule for the next 5-10 years is provided to the patient in written form: see Patient Instructions/AVS.     Return in 3 months (on 2/8/2024) for medication recheck, diabetes, blood pressure. Subjective   The following acute and/or chronic problems were also addressed today:    Patient presents for Medicare wellness visit. We reviewed diabetic regimen. Due to poorly controlled sugars and healing of wound clinic to see if we can add a GLP-1. Hopefully insurance will allow. Patient is following with podiatry for diabetic ulcer on left foot. Educated patient on ulcer and possible complications. Please continue following podiatry. No other concerns or changes. Patient's complete Health Risk Assessment and screening values have been reviewed and are found in Flowsheets. The following problems were reviewed today and where indicated follow up appointments were made and/or referrals ordered.     Positive Risk Factor Screenings with Interventions:                 Weight

## 2023-11-13 ENCOUNTER — OFFICE VISIT (OUTPATIENT)
Dept: PODIATRY | Age: 73
End: 2023-11-13
Payer: MEDICARE

## 2023-11-13 DIAGNOSIS — L97.509 DIABETIC FOOT ULCER WITH OSTEOMYELITIS (HCC): ICD-10-CM

## 2023-11-13 DIAGNOSIS — E11.621 DIABETIC FOOT ULCER WITH OSTEOMYELITIS (HCC): ICD-10-CM

## 2023-11-13 DIAGNOSIS — I73.9 PVD (PERIPHERAL VASCULAR DISEASE) (HCC): ICD-10-CM

## 2023-11-13 DIAGNOSIS — M86.9 DIABETIC FOOT ULCER WITH OSTEOMYELITIS (HCC): ICD-10-CM

## 2023-11-13 DIAGNOSIS — L97.523 ULCER OF LEFT FOOT, WITH NECROSIS OF MUSCLE (HCC): Primary | ICD-10-CM

## 2023-11-13 DIAGNOSIS — E11.69 DIABETIC FOOT ULCER WITH OSTEOMYELITIS (HCC): ICD-10-CM

## 2023-11-13 PROCEDURE — 99999 PR OFFICE/OUTPT VISIT,PROCEDURE ONLY: CPT | Performed by: PODIATRIST

## 2023-11-13 PROCEDURE — 11043 DBRDMT MUSC&/FSCA 1ST 20/<: CPT | Performed by: PODIATRIST

## 2023-11-21 NOTE — PROGRESS NOTES
[address]  Dept: [phone]    RETURN PATIENT PROGRESS NOTE  Date of patient's visit: 11/13/2023  Patient's Name:  Rubens Roth YOB: 1950            Patient Care Team:  Hanna Mckinley as PCP - General (Physician Assistant)  Hanna Mckinley as PCP - Empaneled Provider       Rubens Roth 74 y.o. male that presents for follow-up of   No chief complaint on file. Pt's primary care physician is Aurelia Dubose last seen July 21 2023  Symptoms began 3 week(s) ago and are unchanged . Patient relates pain is Absent . Pain is rated 0 out of 10 and is described as none. Treatments prior to today's visit include: previus podiatry treatment. Currently denies F/C/N/V. No Known Allergies    Past Medical History:   Diagnosis Date    BOOP (bronchiolitis obliterans with organizing pneumonia) ([address]) 04/2014    SUSPECTED CAUSE GROUND MOLD OR POLLEN SPORES, O2  AS NEEDED AT HOME    Deep vein thrombosis of lower extremity ([address]) 7/21/2023    Difficult intravenous access     STATES HAND TH E BEST PLACE TO START    Hx of blood clots 2014    BILAT LEGS AND LUNG TREATED WITH COUMADIN    HX OTHER MEDICAL     BOOP Bronchial oblierate pneumonia    Hypertension     IN PAST RESOLVED NOW    Obesity     On supplemental oxygen therapy     O2 3L AS NEEDED AT HOME STATES NOT USING OFTEN    Pulmonary embolism ([address]) 7/21/2023    Sleep apnea 2014    USES C-PAP NIGHTLY-INSTRUCTED TO BRING    Type II or unspecified type diabetes mellitus without mention of complication, not stated as uncontrolled 2011    Wears glasses        Prior to Admission medications    Medication Sig Start Date End Date Taking?  Authorizing Provider   Dulaglutide 0.75 MG/0.5ML SOPN Inject 0.75 mg into the skin once a week 11/8/23   Rosemary Cordova PA-C   acarbose (PRECOSE) 50 MG tablet  8/15/23   ProviderAbdelrahman MD

## 2023-12-04 ENCOUNTER — OFFICE VISIT (OUTPATIENT)
Dept: PODIATRY | Age: 73
End: 2023-12-04
Payer: MEDICARE

## 2023-12-04 VITALS — HEIGHT: 70 IN | BODY MASS INDEX: 34.07 KG/M2 | WEIGHT: 238 LBS

## 2023-12-04 DIAGNOSIS — E11.621 DIABETIC FOOT ULCER WITH OSTEOMYELITIS (HCC): ICD-10-CM

## 2023-12-04 DIAGNOSIS — M86.9 DIABETIC FOOT ULCER WITH OSTEOMYELITIS (HCC): ICD-10-CM

## 2023-12-04 DIAGNOSIS — I73.9 PVD (PERIPHERAL VASCULAR DISEASE) (HCC): ICD-10-CM

## 2023-12-04 DIAGNOSIS — E11.69 DIABETIC FOOT ULCER WITH OSTEOMYELITIS (HCC): ICD-10-CM

## 2023-12-04 DIAGNOSIS — L97.523 ULCER OF LEFT FOOT, WITH NECROSIS OF MUSCLE (HCC): Primary | ICD-10-CM

## 2023-12-04 DIAGNOSIS — L97.509 DIABETIC FOOT ULCER WITH OSTEOMYELITIS (HCC): ICD-10-CM

## 2023-12-04 PROCEDURE — 99999 PR OFFICE/OUTPT VISIT,PROCEDURE ONLY: CPT | Performed by: PODIATRIST

## 2023-12-04 PROCEDURE — 11043 DBRDMT MUSC&/FSCA 1ST 20/<: CPT | Performed by: PODIATRIST

## 2023-12-04 NOTE — PROGRESS NOTES
[address] [facility]  Dept: [phone]    RETURN PATIENT PROGRESS NOTE  Date of patient's visit: 11/13/2023  Patient's Name:  Edda Flaherty YOB: 1950            Patient Care Team:  Kar Drew as PCP - General (Physician Assistant)  Kar Drew as PCP - Empaneled Provider       Edda Flaherty 74 y.o. male that presents for follow-up of   Chief Complaint   Patient presents with    Wound Check     Left foot       Pt's primary care physician is Aurelia Ruff last seen July 21 2023  Symptoms began 6 week(s) ago and are unchanged . Patient relates pain is Absent . Pain is rated 0 out of 10 and is described as none. Treatments prior to today's visit include: previus podiatry treatment. Currently denies F/C/N/V. No Known Allergies    Past Medical History:   Diagnosis Date    BOOP (bronchiolitis obliterans with organizing pneumonia) ([address]) 04/2014    SUSPECTED CAUSE GROUND MOLD OR POLLEN SPORES, O2  AS NEEDED AT HOME    Deep vein thrombosis of lower extremity ([address]) 7/21/2023    Difficult intravenous access     STATES HAND TH E BEST PLACE TO START    Hx of blood clots 2014    BILAT LEGS AND LUNG TREATED WITH COUMADIN    HX OTHER MEDICAL     BOOP Bronchial oblierate pneumonia    Hypertension     IN PAST RESOLVED NOW    Obesity     On supplemental oxygen therapy     O2 3L AS NEEDED AT HOME STATES NOT USING OFTEN    Pulmonary embolism ([address]) 7/21/2023    Sleep apnea 2014    USES C-PAP NIGHTLY-INSTRUCTED TO BRING    Type II or unspecified type diabetes mellitus without mention of complication, not stated as uncontrolled 2011    Wears glasses        Prior to Admission medications    Medication Sig Start Date End Date Taking?  Authorizing Provider   Dulaglutide 0.75 MG/0.5ML SOPN Inject 0.75 mg into the skin once a week 11/8/23  Yes Jose R Arndt PA-C   acarbose (PRECOSE) 50 MG

## 2024-01-08 ENCOUNTER — OFFICE VISIT (OUTPATIENT)
Dept: PODIATRY | Age: 74
End: 2024-01-08
Payer: MEDICARE

## 2024-01-08 VITALS — BODY MASS INDEX: 34.07 KG/M2 | HEIGHT: 70 IN | WEIGHT: 238 LBS

## 2024-01-08 DIAGNOSIS — I73.9 PVD (PERIPHERAL VASCULAR DISEASE) (HCC): Primary | ICD-10-CM

## 2024-01-08 PROCEDURE — 3017F COLORECTAL CA SCREEN DOC REV: CPT | Performed by: PODIATRIST

## 2024-01-08 PROCEDURE — 1036F TOBACCO NON-USER: CPT | Performed by: PODIATRIST

## 2024-01-08 PROCEDURE — G8417 CALC BMI ABV UP PARAM F/U: HCPCS | Performed by: PODIATRIST

## 2024-01-08 PROCEDURE — 1123F ACP DISCUSS/DSCN MKR DOCD: CPT | Performed by: PODIATRIST

## 2024-01-08 PROCEDURE — 99213 OFFICE O/P EST LOW 20 MIN: CPT | Performed by: PODIATRIST

## 2024-01-08 PROCEDURE — G8484 FLU IMMUNIZE NO ADMIN: HCPCS | Performed by: PODIATRIST

## 2024-01-08 PROCEDURE — G8427 DOCREV CUR MEDS BY ELIG CLIN: HCPCS | Performed by: PODIATRIST

## 2024-01-08 NOTE — PROGRESS NOTES
[facility] PHYSICIANS [facility] PODIATRY  [address] [place], [place] [address]  Dept: [phone]    RETURN PATIENT PROGRESS NOTE  Date of patient's visit: 11/13/2023  Patient's Name:  Eber Bolden YOB: 1950            Patient Care Team:  Jose De Jesus Lancaster PA-C as PCP - General (Physician Assistant)  Jose De Jesus Lancaster PA-C as PCP - Empaneled Provider       Eber Bolden 73 y.o. male that presents for follow-up of   Chief Complaint   Patient presents with    Wound Check     Left foot       Pt's primary care physician is Jose De Jesus Lancaster PA-C last seen July 21 2023  Symptoms began 10 week(s) ago and are unchanged .  Patient relates pain is Absent .  Pain is rated 0 out of 10 and is described as none.  Treatments prior to today's visit include: previus podiatry treatment.  Currently denies F/C/N/V.     No Known Allergies    Past Medical History:   Diagnosis Date    BOOP (bronchiolitis obliterans with organizing pneumonia) ([facility]) 04/2014    SUSPECTED CAUSE GROUND MOLD OR POLLEN SPORES, O2  AS NEEDED AT HOME    Deep vein thrombosis of lower extremity ([facility]) 7/21/2023    Difficult intravenous access     STATES HAND TH E BEST PLACE TO START    Hx of blood clots 2014    BILAT LEGS AND LUNG TREATED WITH COUMADIN    HX OTHER MEDICAL     BOOP Bronchial oblierate pneumonia    Hypertension     IN PAST RESOLVED NOW    Obesity     On supplemental oxygen therapy     O2 3L AS NEEDED AT HOME STATES NOT USING OFTEN    Pulmonary embolism ([facility]) 7/21/2023    Sleep apnea 2014    USES C-PAP NIGHTLY-INSTRUCTED TO BRING    Type II or unspecified type diabetes mellitus without mention of complication, not stated as uncontrolled 2011    Wears glasses        Prior to Admission medications    Medication Sig Start Date End Date Taking? Authorizing Provider   Dulaglutide 0.75 MG/0.5ML SOPN Inject 0.75 mg into the skin once a week 11/8/23  Yes Jose De Jesus Lancaster PA-C   acarbose (PRECOSE) 50

## 2024-01-17 ENCOUNTER — OFFICE VISIT (OUTPATIENT)
Dept: PRIMARY CARE CLINIC | Age: 74
End: 2024-01-17

## 2024-01-17 VITALS
RESPIRATION RATE: 16 BRPM | HEIGHT: 70 IN | SYSTOLIC BLOOD PRESSURE: 124 MMHG | WEIGHT: 237.4 LBS | BODY MASS INDEX: 33.99 KG/M2 | HEART RATE: 77 BPM | DIASTOLIC BLOOD PRESSURE: 82 MMHG | OXYGEN SATURATION: 93 %

## 2024-01-17 DIAGNOSIS — N18.31 STAGE 3A CHRONIC KIDNEY DISEASE (HCC): ICD-10-CM

## 2024-01-17 DIAGNOSIS — Z79.4 TYPE 2 DIABETES MELLITUS WITH STAGE 3B CHRONIC KIDNEY DISEASE, WITH LONG-TERM CURRENT USE OF INSULIN (HCC): Primary | ICD-10-CM

## 2024-01-17 DIAGNOSIS — I10 PRIMARY HYPERTENSION: ICD-10-CM

## 2024-01-17 DIAGNOSIS — N18.32 TYPE 2 DIABETES MELLITUS WITH STAGE 3B CHRONIC KIDNEY DISEASE, WITH LONG-TERM CURRENT USE OF INSULIN (HCC): Primary | ICD-10-CM

## 2024-01-17 DIAGNOSIS — E78.5 DYSLIPIDEMIA: ICD-10-CM

## 2024-01-17 DIAGNOSIS — E11.22 TYPE 2 DIABETES MELLITUS WITH STAGE 3B CHRONIC KIDNEY DISEASE, WITH LONG-TERM CURRENT USE OF INSULIN (HCC): Primary | ICD-10-CM

## 2024-01-17 LAB — HBA1C MFR BLD: 10.3 %

## 2024-01-17 RX ORDER — ACYCLOVIR 400 MG/1
TABLET ORAL
Qty: 3 EACH | Refills: 2 | Status: SHIPPED | OUTPATIENT
Start: 2024-01-17 | End: 2024-01-17 | Stop reason: ALTCHOICE

## 2024-01-17 RX ORDER — PROCHLORPERAZINE 25 MG/1
SUPPOSITORY RECTAL
Qty: 3 EACH | Refills: 2 | Status: SHIPPED | OUTPATIENT
Start: 2024-01-17

## 2024-01-17 RX ORDER — PROCHLORPERAZINE 25 MG/1
SUPPOSITORY RECTAL
Qty: 1 EACH | Refills: 0 | Status: SHIPPED | OUTPATIENT
Start: 2024-01-17

## 2024-01-17 ASSESSMENT — ENCOUNTER SYMPTOMS
SHORTNESS OF BREATH: 0
ABDOMINAL PAIN: 0
SINUS PAIN: 0
EYE PAIN: 0
COUGH: 0
NAUSEA: 0
BLURRED VISION: 0
DIARRHEA: 0
RHINORRHEA: 0
CONSTIPATION: 0
VOMITING: 0
BACK PAIN: 0

## 2024-01-17 ASSESSMENT — PATIENT HEALTH QUESTIONNAIRE - PHQ9
2. FEELING DOWN, DEPRESSED OR HOPELESS: 0
SUM OF ALL RESPONSES TO PHQ QUESTIONS 1-9: 0
1. LITTLE INTEREST OR PLEASURE IN DOING THINGS: 0
SUM OF ALL RESPONSES TO PHQ QUESTIONS 1-9: 0
SUM OF ALL RESPONSES TO PHQ9 QUESTIONS 1 & 2: 0

## 2024-01-17 NOTE — PROGRESS NOTES
[facility]  [facility] PRIMARY CARE  [address] [facility] [address]  Dept: [phone]  Dept Fax: [phone]    Eber Bolden is a 73 y.o. male who presents today for his medical conditions/complaints as noted below.    Chief Complaint   Patient presents with    Diabetes     Would like to try Dexcom or FreeStyle Shahab    Hypertension    Health Maintenance     Due for colonoscopy and Medicare AWV       HPI:     Patient presents the office for routine follow-up.  He has past medical history including hypertension, type 2 diabetes, obstructive sleep apnea, CKD, obesity.    Today, patient reports he is doing fairly well.  Denies any new or acute concerns.    He has followed with podiatry for foot wound.  They are happy with the healing process and do not need to follow with them for 3 months.  He denies any pain or new wound.    Patient would like to discuss getting a continuous glucose monitor.  He is tired of poking his fingers only times per day.  He is on intensive insulin regimen with long-acting and short acting with mealtime.  No recent endocrinologist.    He denies any lightheadedness, dizziness, shortness of breath, chest pain, leg edema, syncope.    Diabetes  He presents for his follow-up diabetic visit. He has type 2 diabetes mellitus. His disease course has been worsening. Pertinent negatives for hypoglycemia include no confusion, dizziness, headaches or nervousness/anxiousness. Pertinent negatives for diabetes include no blurred vision, no chest pain and no fatigue. There are no hypoglycemic complications. Diabetic complications include PVD. Risk factors for coronary artery disease include diabetes mellitus, dyslipidemia, family history, male sex, obesity and hypertension. Current diabetic treatment includes insulin injections and oral agent (dual therapy). He is compliant with treatment most of the time. His weight is stable. He is following a generally unhealthy diet. He rarely

## 2024-01-18 ENCOUNTER — TELEPHONE (OUTPATIENT)
Dept: PRIMARY CARE CLINIC | Age: 74
End: 2024-01-18

## 2024-01-18 DIAGNOSIS — N18.32 TYPE 2 DIABETES MELLITUS WITH STAGE 3B CHRONIC KIDNEY DISEASE, WITH LONG-TERM CURRENT USE OF INSULIN (HCC): Primary | ICD-10-CM

## 2024-01-18 DIAGNOSIS — E11.22 TYPE 2 DIABETES MELLITUS WITH STAGE 3B CHRONIC KIDNEY DISEASE, WITH LONG-TERM CURRENT USE OF INSULIN (HCC): Primary | ICD-10-CM

## 2024-01-18 DIAGNOSIS — Z79.4 TYPE 2 DIABETES MELLITUS WITH STAGE 3B CHRONIC KIDNEY DISEASE, WITH LONG-TERM CURRENT USE OF INSULIN (HCC): Primary | ICD-10-CM

## 2024-01-18 NOTE — TELEPHONE ENCOUNTER
Pt states that the provider he was referred to for Endo is no longer at the location that he preferred, he is only at [facility], and the pt does not want to drive to [place]. He is asking for another referral to Endo.     Please advise.

## 2024-01-22 ENCOUNTER — TELEPHONE (OUTPATIENT)
Dept: PRIMARY CARE CLINIC | Age: 74
End: 2024-01-22

## 2024-01-22 NOTE — TELEPHONE ENCOUNTER
The patient called regarding the Dexcom CGM.  Writer advised the patient that this was sent to [facility] last week.  Writer advised the patient that his insurance requires that the office send the orders to a [employer] company.  Writer inquired if they have contacted the patient to set things up for him, the patient denies that they have contacted him.  Writer advised he would contact [facility] to verify if they received the orders from the office.    Writer spoke with [facility], writer was advised that they have received the orders and it has been sent to the department that handles CGMs.  They should be reaching out to the patient in the next day or two.    Writer spoke with the patient and informed him of the above information, the patient verbalized understanding.

## 2024-01-31 ENCOUNTER — TELEPHONE (OUTPATIENT)
Dept: PRIMARY CARE CLINIC | Age: 74
End: 2024-01-31

## 2024-01-31 DIAGNOSIS — Z79.4 TYPE 2 DIABETES MELLITUS WITH STAGE 3B CHRONIC KIDNEY DISEASE, WITH LONG-TERM CURRENT USE OF INSULIN (HCC): Primary | ICD-10-CM

## 2024-01-31 DIAGNOSIS — E11.22 TYPE 2 DIABETES MELLITUS WITH STAGE 3B CHRONIC KIDNEY DISEASE, WITH LONG-TERM CURRENT USE OF INSULIN (HCC): Primary | ICD-10-CM

## 2024-01-31 DIAGNOSIS — N18.32 TYPE 2 DIABETES MELLITUS WITH STAGE 3B CHRONIC KIDNEY DISEASE, WITH LONG-TERM CURRENT USE OF INSULIN (HCC): Primary | ICD-10-CM

## 2024-01-31 NOTE — TELEPHONE ENCOUNTER
Last Visit Date: 1/17/2024   Next Visit Date: 11/13/2024     Pt would like a refill of his Levemir sent to [facility]. Per pt this was requested multiple times and [facility] has not received an order for the medication yet. Unable to pend medication due to flag for switching to alternative.

## 2024-04-09 ENCOUNTER — TELEPHONE (OUTPATIENT)
Dept: PODIATRY | Age: 74
End: 2024-04-09

## 2024-04-11 ENCOUNTER — OFFICE VISIT (OUTPATIENT)
Dept: PODIATRY | Age: 74
End: 2024-04-11
Payer: MEDICARE

## 2024-04-11 DIAGNOSIS — E11.42 DIABETIC POLYNEUROPATHY ASSOCIATED WITH TYPE 2 DIABETES MELLITUS (HCC): ICD-10-CM

## 2024-04-11 DIAGNOSIS — M79.671 PAIN IN BOTH FEET: Primary | ICD-10-CM

## 2024-04-11 DIAGNOSIS — M79.672 PAIN IN BOTH FEET: Primary | ICD-10-CM

## 2024-04-11 DIAGNOSIS — L97.522 ULCER OF LEFT FOOT, WITH FAT LAYER EXPOSED (HCC): ICD-10-CM

## 2024-04-11 PROCEDURE — 3046F HEMOGLOBIN A1C LEVEL >9.0%: CPT | Performed by: PODIATRIST

## 2024-04-11 PROCEDURE — G8428 CUR MEDS NOT DOCUMENT: HCPCS | Performed by: PODIATRIST

## 2024-04-11 PROCEDURE — 99214 OFFICE O/P EST MOD 30 MIN: CPT | Performed by: PODIATRIST

## 2024-04-11 PROCEDURE — 1036F TOBACCO NON-USER: CPT | Performed by: PODIATRIST

## 2024-04-11 PROCEDURE — 2022F DILAT RTA XM EVC RTNOPTHY: CPT | Performed by: PODIATRIST

## 2024-04-11 PROCEDURE — G8417 CALC BMI ABV UP PARAM F/U: HCPCS | Performed by: PODIATRIST

## 2024-04-11 PROCEDURE — 1123F ACP DISCUSS/DSCN MKR DOCD: CPT | Performed by: PODIATRIST

## 2024-04-11 PROCEDURE — 11042 DBRDMT SUBQ TIS 1ST 20SQCM/<: CPT | Performed by: PODIATRIST

## 2024-04-11 PROCEDURE — 3017F COLORECTAL CA SCREEN DOC REV: CPT | Performed by: PODIATRIST

## 2024-04-11 RX ORDER — DOXYCYCLINE HYCLATE 100 MG
100 TABLET ORAL 2 TIMES DAILY
Qty: 20 TABLET | Refills: 0 | Status: SHIPPED | OUTPATIENT
Start: 2024-04-11 | End: 2024-04-21

## 2024-04-11 NOTE — PROGRESS NOTES
[facility] PHYSICIANS [facility], [facility] PODIATRY  [address] [place], [place] [id number]  Dept: [phone]    RETURN PATIENT PROGRESS NOTE  Date of patient's visit: 11/13/2023  Patient's Name:  Eber Bolden YOB: 1950            Patient Care Team:  Jose De Jesus Lancaster PA-C as PCP - General (Physician Assistant)  Jose De Jesus Lancaster PA-C as PCP - Empaneled Provider       Eber Bolden 73 y.o. male that presents for follow-up of   Chief Complaint   Patient presents with    Wound Check     Left foot       Pt's primary care physician is Jose De Jesus Lancaster PA-C last seen January 17 2024  Symptoms began 3 months ago and are worsened to left foot and relates to wound that he noticed recently. He denies any injury to the foot .  Patient relates pain is Absent due to neuropathy .  Pain is rated 0 out of 10 and is described as none.  Treatments prior to today's visit include: previus podiatry treatment.  Currently denies F/C/N/V.     No Known Allergies    Past Medical History:   Diagnosis Date    BOOP (bronchiolitis obliterans with organizing pneumonia) ([facility]) 04/2014    SUSPECTED CAUSE GROUND MOLD OR POLLEN SPORES, O2  AS NEEDED AT HOME    Chronic obstructive pulmonary disease ([facility]) 02/12/2014    Deep vein thrombosis of lower extremity ([facility]) 07/21/2023    Difficult intravenous access     STATES HAND TH E BEST PLACE TO START    Hx of blood clots 2014    BILAT LEGS AND LUNG TREATED WITH COUMADIN    HX OTHER MEDICAL     BOOP Bronchial oblierate pneumonia    Hypertension     IN PAST RESOLVED NOW    Obesity     On supplemental oxygen therapy     O2 3L AS NEEDED AT HOME STATES NOT USING OFTEN    Pulmonary embolism ([facility]) 07/21/2023    Sleep apnea 2014    USES C-PAP NIGHTLY-INSTRUCTED TO BRING    Type II or unspecified type diabetes mellitus without mention of complication, not stated as uncontrolled 2011    Wears glasses        Prior to Admission medications    Medication Sig Start Date

## 2024-04-29 ENCOUNTER — OFFICE VISIT (OUTPATIENT)
Dept: PODIATRY | Age: 74
End: 2024-04-29

## 2024-04-29 VITALS — HEIGHT: 70 IN | WEIGHT: 237 LBS | BODY MASS INDEX: 33.93 KG/M2

## 2024-04-29 DIAGNOSIS — E11.42 DIABETIC POLYNEUROPATHY ASSOCIATED WITH TYPE 2 DIABETES MELLITUS (HCC): ICD-10-CM

## 2024-04-29 DIAGNOSIS — I73.9 PVD (PERIPHERAL VASCULAR DISEASE) (HCC): ICD-10-CM

## 2024-04-29 DIAGNOSIS — M86.9 DIABETIC FOOT ULCER WITH OSTEOMYELITIS (HCC): ICD-10-CM

## 2024-04-29 DIAGNOSIS — L97.522 ULCER OF LEFT FOOT, WITH FAT LAYER EXPOSED (HCC): Primary | ICD-10-CM

## 2024-04-29 DIAGNOSIS — E11.621 DIABETIC FOOT ULCER WITH OSTEOMYELITIS (HCC): ICD-10-CM

## 2024-04-29 DIAGNOSIS — E11.69 DIABETIC FOOT ULCER WITH OSTEOMYELITIS (HCC): ICD-10-CM

## 2024-04-29 DIAGNOSIS — L97.509 DIABETIC FOOT ULCER WITH OSTEOMYELITIS (HCC): ICD-10-CM

## 2024-04-29 NOTE — PROGRESS NOTES
[facility] PHYSICIANS [facility], [facility] PODIATRY  [address] [place], [place] [address]  Dept: [phone]    RETURN PATIENT PROGRESS NOTE  Date of patient's visit: 11/13/2023  Patient's Name:  Eber Bolden YOB: 1950            Patient Care Team:  Jose De Jesus Lancaster PA-C as PCP - General (Physician Assistant)  Jose De Jesus Lancaster PA-C as PCP - Empaneled Provider       Eber Bolden 73 y.o. male that presents for follow-up of   Chief Complaint   Patient presents with    Wound Check     Left foot       Pt's primary care physician is Jose De Jesus Lancaster PA-C last seen January 17 2024  Symptoms began 2 weeks ago. He denies any injury to the foot .  Patient relates pain is Absent due to neuropathy .  Pain is rated 02out of 10 and is described as none.  Treatments prior to today's visit include: previus podiatry treatment.  Currently denies F/C/N/V.     No Known Allergies    Past Medical History:   Diagnosis Date    BOOP (bronchiolitis obliterans with organizing pneumonia) ([facility]) 04/2014    SUSPECTED CAUSE GROUND MOLD OR POLLEN SPORES, O2  AS NEEDED AT HOME    Chronic obstructive pulmonary disease ([facility]) 02/12/2014    Deep vein thrombosis of lower extremity ([facility]) 07/21/2023    Difficult intravenous access     STATES HAND TH E BEST PLACE TO START    Hx of blood clots 2014    BILAT LEGS AND LUNG TREATED WITH COUMADIN    HX OTHER MEDICAL     BOOP Bronchial oblierate pneumonia    Hypertension     IN PAST RESOLVED NOW    Obesity     On supplemental oxygen therapy     O2 3L AS NEEDED AT HOME STATES NOT USING OFTEN    Pulmonary embolism ([facility]) 07/21/2023    Sleep apnea 2014    USES C-PAP NIGHTLY-INSTRUCTED TO BRING    Type II or unspecified type diabetes mellitus without mention of complication, not stated as uncontrolled 2011    Wears glasses        Prior to Admission medications    Medication Sig Start Date End Date Taking? Authorizing Provider   insulin detemir (LEVEMIR FLEXPEN) 100

## 2024-05-20 ENCOUNTER — OFFICE VISIT (OUTPATIENT)
Dept: PODIATRY | Age: 74
End: 2024-05-20
Payer: MEDICARE

## 2024-05-20 VITALS — WEIGHT: 237 LBS | BODY MASS INDEX: 33.93 KG/M2 | HEIGHT: 70 IN

## 2024-05-20 DIAGNOSIS — L97.509 DIABETIC FOOT ULCER WITH OSTEOMYELITIS (HCC): Primary | ICD-10-CM

## 2024-05-20 DIAGNOSIS — M86.9 DIABETIC FOOT ULCER WITH OSTEOMYELITIS (HCC): Primary | ICD-10-CM

## 2024-05-20 DIAGNOSIS — I73.9 PVD (PERIPHERAL VASCULAR DISEASE) (HCC): ICD-10-CM

## 2024-05-20 DIAGNOSIS — E11.42 DIABETIC POLYNEUROPATHY ASSOCIATED WITH TYPE 2 DIABETES MELLITUS (HCC): ICD-10-CM

## 2024-05-20 DIAGNOSIS — E11.621 DIABETIC FOOT ULCER WITH OSTEOMYELITIS (HCC): Primary | ICD-10-CM

## 2024-05-20 DIAGNOSIS — E11.69 DIABETIC FOOT ULCER WITH OSTEOMYELITIS (HCC): Primary | ICD-10-CM

## 2024-05-20 DIAGNOSIS — L97.523 ULCER OF LEFT FOOT, WITH NECROSIS OF MUSCLE (HCC): ICD-10-CM

## 2024-05-20 PROCEDURE — 11043 DBRDMT MUSC&/FSCA 1ST 20/<: CPT | Performed by: PODIATRIST

## 2024-05-20 RX ORDER — INSULIN GLARGINE 100 [IU]/ML
INJECTION, SOLUTION SUBCUTANEOUS
COMMUNITY
Start: 2024-04-05

## 2024-05-20 NOTE — PROGRESS NOTES
[facility] PHYSICIANS [facility] PODIATRY  [address] [place], [place] [address]  Dept: [phone]    RETURN PATIENT PROGRESS NOTE  Date of patient's visit: 11/13/2023  Patient's Name:  Eber Bolden YOB: 1950            Patient Care Team:  Jose De Jesus Lancaster PA-C as PCP - General (Physician Assistant)  Jose De Jesus Lancaster PA-C as PCP - Empaneled Provider       Eber Bolden 73 y.o. male that presents for follow-up of   Chief Complaint   Patient presents with    Wound Check     Left foot       Pt's primary care physician is Jose De Jesus Lancaster PA-C last seen January 17 2024  Symptoms began 5 weeks ago. He denies any injury to the foot .  Patient relates pain is Absent due to neuropathy .  Pain is rated 0 out of 10 and is described as none.  Treatments prior to today's visit include: previus podiatry treatment.  Currently denies F/C/N/V.     No Known Allergies    Past Medical History:   Diagnosis Date    BOOP (bronchiolitis obliterans with organizing pneumonia) ([facility]) 04/2014    SUSPECTED CAUSE GROUND MOLD OR POLLEN SPORES, O2  AS NEEDED AT HOME    Chronic obstructive pulmonary disease ([facility]) 02/12/2014    Deep vein thrombosis of lower extremity ([facility]) 07/21/2023    Difficult intravenous access     STATES HAND TH E BEST PLACE TO START    Hx of blood clots 2014    BILAT LEGS AND LUNG TREATED WITH COUMADIN    HX OTHER MEDICAL     BOOP Bronchial oblierate pneumonia    Hypertension     IN PAST RESOLVED NOW    Obesity     On supplemental oxygen therapy     O2 3L AS NEEDED AT HOME STATES NOT USING OFTEN    Pulmonary embolism ([facility]) 07/21/2023    Sleep apnea 2014    USES C-PAP NIGHTLY-INSTRUCTED TO BRING    Type II or unspecified type diabetes mellitus without mention of complication, not stated as uncontrolled 2011    Wears glasses        Prior to Admission medications    Medication Sig Start Date End Date Taking? Authorizing Provider   sertraline (ZOLOFT) 50 MG tablet Take

## 2024-11-14 ENCOUNTER — HOSPITAL ENCOUNTER (OUTPATIENT)
Age: 74
Setting detail: SPECIMEN
Discharge: HOME | End: 2024-11-14

## 2024-11-14 LAB
ALBUMIN SERPL-MCNC: 4.4 G/DL (ref 3.5–5.2)
ALBUMIN/GLOB SERPL: 1.4 {RATIO} (ref 1–2.5)
ALP SERPL-CCNC: 95 U/L (ref 40–129)
ALT SERPL-CCNC: 44 U/L (ref 10–50)
ANION GAP SERPL CALCULATED.3IONS-SCNC: 14 MMOL/L (ref 9–16)
AST SERPL-CCNC: 38 U/L (ref 10–50)
BILIRUB SERPL-MCNC: 0.3 MG/DL (ref 0–1.2)
BUN SERPL-MCNC: 33 MG/DL (ref 8–23)
CALCIUM SERPL-MCNC: 9.4 MG/DL (ref 8.6–10.4)
CHLORIDE SERPL-SCNC: 103 MMOL/L (ref 98–107)
CHOLEST SERPL-MCNC: 152 MG/DL (ref 0–199)
CHOLESTEROL/HDL RATIO: 5.1
CO2 SERPL-SCNC: 22 MMOL/L (ref 20–31)
CREAT SERPL-MCNC: 1.8 MG/DL (ref 0.7–1.2)
EST. AVERAGE GLUCOSE BLD GHB EST-MCNC: 163 MG/DL
GFR, ESTIMATED: 39 ML/MIN/1.73M2
GLUCOSE SERPL-MCNC: 116 MG/DL (ref 74–99)
HBA1C MFR BLD: 7.3 % (ref 4–6)
HDLC SERPL-MCNC: 30 MG/DL
LDLC SERPL CALC-MCNC: 90 MG/DL (ref 0–100)
POTASSIUM SERPL-SCNC: 5.4 MMOL/L (ref 3.7–5.3)
PROT SERPL-MCNC: 7.5 G/DL (ref 6.6–8.7)
SODIUM SERPL-SCNC: 139 MMOL/L (ref 136–145)
TRIGL SERPL-MCNC: 159 MG/DL
VLDLC SERPL CALC-MCNC: 32 MG/DL (ref 1–30)

## 2024-11-15 ENCOUNTER — HOSPITAL ENCOUNTER (OUTPATIENT)
Age: 74
Setting detail: SPECIMEN
Discharge: HOME | End: 2024-11-15

## 2024-11-15 ENCOUNTER — OFFICE VISIT (OUTPATIENT)
Dept: PRIMARY CARE CLINIC | Age: 74
End: 2024-11-15

## 2024-11-15 VITALS
OXYGEN SATURATION: 97 % | BODY MASS INDEX: 34.65 KG/M2 | SYSTOLIC BLOOD PRESSURE: 110 MMHG | WEIGHT: 242 LBS | DIASTOLIC BLOOD PRESSURE: 82 MMHG | HEART RATE: 85 BPM | HEIGHT: 70 IN

## 2024-11-15 DIAGNOSIS — Z23 FLU VACCINE NEED: ICD-10-CM

## 2024-11-15 DIAGNOSIS — M54.50 CHRONIC LOW BACK PAIN WITHOUT SCIATICA, UNSPECIFIED BACK PAIN LATERALITY: ICD-10-CM

## 2024-11-15 DIAGNOSIS — Z00.00 MEDICARE ANNUAL WELLNESS VISIT, SUBSEQUENT: Primary | ICD-10-CM

## 2024-11-15 DIAGNOSIS — M19.90 ARTHRITIS: ICD-10-CM

## 2024-11-15 DIAGNOSIS — G89.29 CHRONIC LOW BACK PAIN WITHOUT SCIATICA, UNSPECIFIED BACK PAIN LATERALITY: ICD-10-CM

## 2024-11-15 PROBLEM — R68.89 INTOLERANT OF HEAT: Status: RESOLVED | Noted: 2023-07-21 | Resolved: 2024-11-15

## 2024-11-15 LAB
CREAT UR-MCNC: 88.8 MG/DL (ref 39–259)
MICROALBUMIN UR-MCNC: <12 MG/L (ref 0–20)
MICROALBUMIN/CREAT UR-RTO: NORMAL MCG/MG CREAT (ref 0–17)

## 2024-11-15 RX ORDER — ACYCLOVIR 400 MG/1
1 TABLET ORAL DAILY
COMMUNITY

## 2024-11-15 RX ORDER — ACYCLOVIR 400 MG/1
TABLET ORAL
Status: CANCELLED
Start: 2024-11-15

## 2024-11-15 SDOH — ECONOMIC STABILITY: INCOME INSECURITY: HOW HARD IS IT FOR YOU TO PAY FOR THE VERY BASICS LIKE FOOD, HOUSING, MEDICAL CARE, AND HEATING?: NOT HARD AT ALL

## 2024-11-15 SDOH — ECONOMIC STABILITY: FOOD INSECURITY: WITHIN THE PAST 12 MONTHS, THE FOOD YOU BOUGHT JUST DIDN'T LAST AND YOU DIDN'T HAVE MONEY TO GET MORE.: NEVER TRUE

## 2024-11-15 SDOH — ECONOMIC STABILITY: FOOD INSECURITY: WITHIN THE PAST 12 MONTHS, YOU WORRIED THAT YOUR FOOD WOULD RUN OUT BEFORE YOU GOT MONEY TO BUY MORE.: NEVER TRUE

## 2024-11-15 ASSESSMENT — LIFESTYLE VARIABLES
HOW OFTEN DO YOU HAVE A DRINK CONTAINING ALCOHOL: NEVER
HOW MANY STANDARD DRINKS CONTAINING ALCOHOL DO YOU HAVE ON A TYPICAL DAY: PATIENT DOES NOT DRINK

## 2024-11-15 ASSESSMENT — PATIENT HEALTH QUESTIONNAIRE - PHQ9
2. FEELING DOWN, DEPRESSED OR HOPELESS: NOT AT ALL
SUM OF ALL RESPONSES TO PHQ QUESTIONS 1-9: 0
1. LITTLE INTEREST OR PLEASURE IN DOING THINGS: NOT AT ALL
SUM OF ALL RESPONSES TO PHQ QUESTIONS 1-9: 0
SUM OF ALL RESPONSES TO PHQ9 QUESTIONS 1 & 2: 0

## 2024-11-15 NOTE — PATIENT INSTRUCTIONS
Plan for Eber Bolden - 11/15/2024  Medicare offers a range of preventive health benefits. Some of the tests and screenings are paid in full while other may be subject to a deductible, co-insurance, and/or copay.    Some of these benefits include a comprehensive review of your medical history including lifestyle, illnesses that may run in your family, and various assessments and screenings as appropriate.    After reviewing your medical record and screening and assessments performed today your provider may have ordered immunizations, labs, imaging, and/or referrals for you.  A list of these orders (if applicable) as well as your Preventive Care list are included within your After Visit Summary for your review.    Other Preventive Recommendations:    A preventive eye exam performed by an eye specialist is recommended every 1-2 years to screen for glaucoma; cataracts, macular degeneration, and other eye disorders.  A preventive dental visit is recommended every 6 months.  Try to get at least 150 minutes of exercise per week or 10,000 steps per day on a pedometer .  Order or download the FREE \"Exercise & Physical Activity: Your Everyday Guide\" from [employer] [place] [employer]. Call 1-252.531.9401 or search [employer] [place] [employer] online.  You need 3411-2499 mg of calcium and 5032-1051 IU of vitamin D per day. It is possible to meet your calcium requirement with diet alone, but a vitamin D supplement is usually necessary to meet this goal.  When exposed to the sun, use a sunscreen that protects against both UVA and UVB radiation with an SPF of 30 or greater. Reapply every 2 to 3 hours or after sweating, drying off with a towel, or swimming.  Always wear a seat belt when traveling in a car. Always wear a helmet when riding a bicycle or motorcycle.

## 2024-11-15 NOTE — PROGRESS NOTES
Medicare Annual Wellness Visit    Eber Bolden is here for Medicare AWV    Assessment & Plan   Medicare annual wellness visit, subsequent  Comments:  -Patient provided with MAW chart; discussed which vaccines and screenings were due; provided patient with information to make informed decision  Arthritis  Assessment & Plan:  -condition stable  -pt needed handicap placard prescription; printed and signed today  Orders:  -     Handicap Placard MISC; Starting Fri 11/15/2024, Disp-1 each, R-0, Print  Chronic low back pain without sciatica, unspecified back pain laterality  Assessment & Plan:  -condition stable  -handicap placard prescription printed and signed today  Orders:  -     Handicap Placard MISC; Starting Fri 11/15/2024, Disp-1 each, R-0, Print  Flu vaccine need  -     Influenza, FLUAD Trivalent, (age 65 y+), IM, Preservative Free, 0.5mL    Recommendations for Preventive Services Due: see orders and patient instructions/AVS.  Recommended screening schedule for the next 5-10 years is provided to the patient in written form: see Patient Instructions/AVS.     Medicare Service Recommended Frequency Last Done Due next   Pneumonia vaccine After 65, Prevnar 20 ONCE PPSV23- 7/30/14 PCV20 due now, then series is complete   Influenza vaccine Yearly 11/8/23 11/8/24- given today   Covid Annually 3/3/21 Due now   Zoster/Shingles   Shingrix vaccine: 2 shots, 2-6 months apart 9/12/21 Due now   Tetanus vaccine (Td or Tdap) Every 10 years  1/11/16 1/11/26   Mammogram Every 1-2 years (ages ~40-75) N/A N/A   Colorectal Cancer Screening FIT test yearly, Cologuard every 3 years, Colonoscopy every 10 years *unless otherwise indicated* Never done Due now   Prostate Cancer Shared decision making with patient depending on family history and risk No record Due now   Cardiovascular/cholesterol screening As determined by your physician 11/14/24  Total cholesterol: 152  Triglycerides: 159  HDL (good cholesterol): 30  LDL (bad cholesterol): 90

## 2025-02-05 ENCOUNTER — HOSPITAL ENCOUNTER (OUTPATIENT)
Age: 75
Setting detail: SPECIMEN
Discharge: HOME | End: 2025-02-05

## 2025-02-05 ENCOUNTER — OFFICE VISIT (OUTPATIENT)
Dept: PRIMARY CARE CLINIC | Age: 75
End: 2025-02-05
Payer: MEDICARE

## 2025-02-05 VITALS
HEART RATE: 101 BPM | WEIGHT: 234.8 LBS | OXYGEN SATURATION: 98 % | HEIGHT: 70 IN | SYSTOLIC BLOOD PRESSURE: 110 MMHG | BODY MASS INDEX: 33.61 KG/M2 | DIASTOLIC BLOOD PRESSURE: 68 MMHG

## 2025-02-05 DIAGNOSIS — R53.81 MALAISE AND FATIGUE: Primary | ICD-10-CM

## 2025-02-05 DIAGNOSIS — R53.83 MALAISE AND FATIGUE: ICD-10-CM

## 2025-02-05 DIAGNOSIS — R63.8 ALTERATION IN APPETITE: ICD-10-CM

## 2025-02-05 DIAGNOSIS — R53.83 MALAISE AND FATIGUE: Primary | ICD-10-CM

## 2025-02-05 DIAGNOSIS — R26.9 ALTERED GAIT: ICD-10-CM

## 2025-02-05 DIAGNOSIS — R53.81 MALAISE AND FATIGUE: ICD-10-CM

## 2025-02-05 LAB
ALBUMIN SERPL-MCNC: 3.7 G/DL (ref 3.5–5.2)
ALBUMIN/GLOB SERPL: 0.9 {RATIO} (ref 1–2.5)
ALP SERPL-CCNC: 116 U/L (ref 40–129)
ALT SERPL-CCNC: 195 U/L (ref 10–50)
ANION GAP SERPL CALCULATED.3IONS-SCNC: 16 MMOL/L (ref 9–16)
AST SERPL-CCNC: 139 U/L (ref 10–50)
BACTERIA URNS QL MICRO: ABNORMAL
BASOPHILS # BLD: 0.11 K/UL (ref 0–0.2)
BASOPHILS NFR BLD: 1 % (ref 0–2)
BILIRUB SERPL-MCNC: 0.4 MG/DL (ref 0–1.2)
BILIRUB UR QL STRIP: NEGATIVE
BUN SERPL-MCNC: 52 MG/DL (ref 8–23)
CALCIUM SERPL-MCNC: 9.4 MG/DL (ref 8.6–10.4)
CASTS #/AREA URNS LPF: ABNORMAL /LPF (ref 0–8)
CHLORIDE SERPL-SCNC: 101 MMOL/L (ref 98–107)
CLARITY UR: ABNORMAL
CO2 SERPL-SCNC: 19 MMOL/L (ref 20–31)
COLOR UR: YELLOW
CREAT SERPL-MCNC: 2 MG/DL (ref 0.7–1.2)
EOSINOPHIL # BLD: 0.11 K/UL (ref 0–0.44)
EOSINOPHILS RELATIVE PERCENT: 1 % (ref 1–4)
EPI CELLS #/AREA URNS HPF: ABNORMAL /HPF (ref 0–5)
ERYTHROCYTE [DISTWIDTH] IN BLOOD BY AUTOMATED COUNT: 15 % (ref 11.8–14.4)
GFR, ESTIMATED: 34 ML/MIN/1.73M2
GLUCOSE P FAST SERPL-MCNC: 219 MG/DL (ref 74–99)
GLUCOSE UR STRIP-MCNC: NEGATIVE MG/DL
HCT VFR BLD AUTO: 35.9 % (ref 40.7–50.3)
HGB BLD-MCNC: 11.5 G/DL (ref 13–17)
HGB UR QL STRIP.AUTO: ABNORMAL
IMM GRANULOCYTES # BLD AUTO: 0.22 K/UL (ref 0–0.3)
IMM GRANULOCYTES NFR BLD: 2 %
KETONES UR STRIP-MCNC: NEGATIVE MG/DL
LEUKOCYTE ESTERASE UR QL STRIP: ABNORMAL
LYMPHOCYTES NFR BLD: 1.54 K/UL (ref 1.1–3.7)
LYMPHOCYTES RELATIVE PERCENT: 14 % (ref 24–43)
MAGNESIUM SERPL-MCNC: 2.4 MG/DL (ref 1.6–2.4)
MCH RBC QN AUTO: 35.7 PG (ref 25.2–33.5)
MCHC RBC AUTO-ENTMCNC: 32 G/DL (ref 28.4–34.8)
MCV RBC AUTO: 111.5 FL (ref 82.6–102.9)
MONOCYTES NFR BLD: 0.55 K/UL (ref 0.1–1.2)
MONOCYTES NFR BLD: 5 % (ref 3–12)
MORPHOLOGY: ABNORMAL
NEUTROPHILS NFR BLD: 77 % (ref 36–65)
NEUTS SEG NFR BLD: 8.47 K/UL (ref 1.5–8.1)
NITRITE UR QL STRIP: NEGATIVE
NRBC BLD-RTO: 0 PER 100 WBC
PH UR STRIP: 5 [PH] (ref 5–8)
PLATELET # BLD AUTO: 260 K/UL (ref 138–453)
PMV BLD AUTO: 10 FL (ref 8.1–13.5)
POTASSIUM SERPL-SCNC: 5.6 MMOL/L (ref 3.7–5.3)
PROT SERPL-MCNC: 7.6 G/DL (ref 6.6–8.7)
PROT UR STRIP-MCNC: ABNORMAL MG/DL
RBC # BLD AUTO: 3.22 M/UL (ref 4.21–5.77)
RBC #/AREA URNS HPF: ABNORMAL /HPF (ref 0–4)
SODIUM SERPL-SCNC: 136 MMOL/L (ref 136–145)
SP GR UR STRIP: 1.02 (ref 1–1.03)
UROBILINOGEN UR STRIP-ACNC: NORMAL EU/DL (ref 0–1)
WBC #/AREA URNS HPF: ABNORMAL /HPF (ref 0–5)
WBC OTHER # BLD: 11 K/UL (ref 3.5–11.3)

## 2025-02-05 PROCEDURE — 1123F ACP DISCUSS/DSCN MKR DOCD: CPT | Performed by: PHYSICIAN ASSISTANT

## 2025-02-05 PROCEDURE — 1159F MED LIST DOCD IN RCRD: CPT | Performed by: PHYSICIAN ASSISTANT

## 2025-02-05 PROCEDURE — 3074F SYST BP LT 130 MM HG: CPT | Performed by: PHYSICIAN ASSISTANT

## 2025-02-05 PROCEDURE — 1160F RVW MEDS BY RX/DR IN RCRD: CPT | Performed by: PHYSICIAN ASSISTANT

## 2025-02-05 PROCEDURE — 3078F DIAST BP <80 MM HG: CPT | Performed by: PHYSICIAN ASSISTANT

## 2025-02-05 PROCEDURE — 99214 OFFICE O/P EST MOD 30 MIN: CPT | Performed by: PHYSICIAN ASSISTANT

## 2025-02-05 RX ORDER — BLOOD SUGAR DIAGNOSTIC
STRIP MISCELLANEOUS
COMMUNITY

## 2025-02-05 RX ORDER — ASPIRIN 81 MG/1
1 TABLET, CHEWABLE ORAL DAILY
COMMUNITY
End: 2025-02-05

## 2025-02-05 SDOH — ECONOMIC STABILITY: FOOD INSECURITY: WITHIN THE PAST 12 MONTHS, YOU WORRIED THAT YOUR FOOD WOULD RUN OUT BEFORE YOU GOT MONEY TO BUY MORE.: NEVER TRUE

## 2025-02-05 SDOH — ECONOMIC STABILITY: FOOD INSECURITY: WITHIN THE PAST 12 MONTHS, THE FOOD YOU BOUGHT JUST DIDN'T LAST AND YOU DIDN'T HAVE MONEY TO GET MORE.: NEVER TRUE

## 2025-02-05 ASSESSMENT — PATIENT HEALTH QUESTIONNAIRE - PHQ9
1. LITTLE INTEREST OR PLEASURE IN DOING THINGS: SEVERAL DAYS
SUM OF ALL RESPONSES TO PHQ QUESTIONS 1-9: 1
SUM OF ALL RESPONSES TO PHQ QUESTIONS 1-9: 1
2. FEELING DOWN, DEPRESSED OR HOPELESS: NOT AT ALL
SUM OF ALL RESPONSES TO PHQ9 QUESTIONS 1 & 2: 1
SUM OF ALL RESPONSES TO PHQ QUESTIONS 1-9: 1
SUM OF ALL RESPONSES TO PHQ QUESTIONS 1-9: 1

## 2025-02-05 NOTE — PROGRESS NOTES
[facility]  [facility] PRIMARY CARE  [address] [facility] [address]  Dept: [phone]  Dept Fax: [phone]    Eber Bolden is a 74 y.o. male who presents today for his medical conditions/complaints as noted below.    Chief Complaint   Patient presents with    cognitive concerns     Patient has some cognitive concerns. Patient has been sick about a week or so. Patient has been having a hard time walking and not able to think straight. Patient is weaker than normal. Patient has been sleeping  a lot more than usual. Not eating much.    Fall     Patient fell while walking on 1/23 and 1/24. Lost balance        HPI:     Patient presents to the office to discuss new concerns.  He is [place] by wife who helps provide history.  They report that starting around January 21 he developed worsening changes.  On this day he had a dental procedure.  He reports not having much motivation to get out of bed, feeling tired, fatigued, and low appetite with a little bit of nausea.  He feels that he is having some weakness and changes in his balance.  Wife is concerned that he is also having some cognitive changes which are new.  He did have some previously, but does seem to be worse.  He denies any difficulty breathing.  No recent URI symptoms.  Denies any abdominal pain or vomiting.  Denies any blood in the urine or bowel movement changes.  Denies any flank pain.  Denies any fevers or chills.  Symptoms are very vague and he is not too specific.  No recent long travel.  Denies any sick contacts.    Blood pressure stable.  Pulse ox stable.        Hemoglobin A1C (%)   Date Value   11/14/2024 7.3 (H)   01/17/2024 10.3 (H)   10/09/2023 8.0             ( goal A1C is < 7)   No components found for: \"LABMICR\"  No components found for: \"LDLCHOLESTEROL\", \"LDLCALC\"    (goal LDL is <100)   AST (U/L)   Date Value   02/05/2025 139 (H)     ALT (U/L)   Date Value   02/05/2025 195 (H)     BUN (mg/dL)   Date Value   02/05/2025

## 2025-02-06 ENCOUNTER — APPOINTMENT (OUTPATIENT)
Dept: CT IMAGING | Age: 75
End: 2025-02-06
Payer: MEDICARE

## 2025-02-06 ENCOUNTER — HOSPITAL ENCOUNTER (INPATIENT)
Age: 75
LOS: 1 days | Discharge: HOME | End: 2025-02-07
Attending: EMERGENCY MEDICINE | Admitting: STUDENT IN AN ORGANIZED HEALTH CARE EDUCATION/TRAINING PROGRAM
Payer: MEDICARE

## 2025-02-06 DIAGNOSIS — R74.8 ELEVATED LIVER ENZYMES: ICD-10-CM

## 2025-02-06 DIAGNOSIS — R79.89 ELEVATED TROPONIN: ICD-10-CM

## 2025-02-06 DIAGNOSIS — N30.00 ACUTE CYSTITIS WITHOUT HEMATURIA: Primary | ICD-10-CM

## 2025-02-06 PROBLEM — R74.01 TRANSAMINITIS: Status: ACTIVE | Noted: 2025-02-06

## 2025-02-06 LAB
ALBUMIN SERPL-MCNC: 3.8 G/DL (ref 3.5–5.2)
ALBUMIN/GLOB SERPL: 0.8 {RATIO} (ref 1–2.5)
ALP SERPL-CCNC: 116 U/L (ref 40–129)
ALT SERPL-CCNC: 170 U/L (ref 5–41)
ANION GAP SERPL CALCULATED.3IONS-SCNC: 14 MMOL/L (ref 9–17)
AST SERPL-CCNC: 90 U/L
BASOPHILS # BLD: 0.1 K/UL (ref 0–0.2)
BASOPHILS NFR BLD: 1 % (ref 0–2)
BILIRUB SERPL-MCNC: 0.4 MG/DL (ref 0.3–1.2)
BNP SERPL-MCNC: 222 PG/ML
BUN SERPL-MCNC: 45 MG/DL (ref 8–23)
CALCIUM SERPL-MCNC: 9.5 MG/DL (ref 8.6–10.4)
CHLORIDE SERPL-SCNC: 100 MMOL/L (ref 98–107)
CO2 SERPL-SCNC: 24 MMOL/L (ref 20–31)
CREAT SERPL-MCNC: 2 MG/DL (ref 0.7–1.2)
EOSINOPHIL # BLD: 0.1 K/UL (ref 0–0.4)
EOSINOPHILS RELATIVE PERCENT: 1 % (ref 1–4)
ERYTHROCYTE [DISTWIDTH] IN BLOOD BY AUTOMATED COUNT: 15.5 % (ref 12.5–15.4)
GFR, ESTIMATED: 34 ML/MIN/1.73M2
GLUCOSE BLD-MCNC: 221 MG/DL (ref 75–110)
GLUCOSE SERPL-MCNC: 231 MG/DL (ref 70–99)
HCT VFR BLD AUTO: 33.1 % (ref 41–53)
HGB BLD-MCNC: 11.2 G/DL (ref 13.5–17.5)
LACTATE BLDV-SCNC: 1.5 MMOL/L (ref 0.5–2.2)
LIPASE SERPL-CCNC: 41 U/L (ref 13–60)
LYMPHOCYTES NFR BLD: 1.2 K/UL (ref 1–4.8)
LYMPHOCYTES RELATIVE PERCENT: 13 % (ref 24–44)
MCH RBC QN AUTO: 36.2 PG (ref 26–34)
MCHC RBC AUTO-ENTMCNC: 33.9 G/DL (ref 31–37)
MCV RBC AUTO: 106.8 FL (ref 80–100)
MONOCYTES NFR BLD: 0.5 K/UL (ref 0.1–1.2)
MONOCYTES NFR BLD: 5 % (ref 2–11)
NEUTROPHILS NFR BLD: 80 % (ref 36–66)
NEUTS SEG NFR BLD: 7.5 K/UL (ref 1.8–7.7)
PLATELET # BLD AUTO: 320 K/UL (ref 140–450)
PMV BLD AUTO: 7.2 FL (ref 6–12)
POTASSIUM SERPL-SCNC: 5.1 MMOL/L (ref 3.7–5.3)
POTASSIUM SERPL-SCNC: 5.6 MMOL/L (ref 3.7–5.3)
PROT SERPL-MCNC: 8.3 G/DL (ref 6.4–8.3)
RBC # BLD AUTO: 3.1 M/UL (ref 4.5–5.9)
SODIUM SERPL-SCNC: 138 MMOL/L (ref 135–144)
TROPONIN I SERPL HS-MCNC: 50 NG/L (ref 0–22)
TROPONIN I SERPL HS-MCNC: 52 NG/L (ref 0–22)
WBC OTHER # BLD: 9.3 K/UL (ref 3.5–11)

## 2025-02-06 PROCEDURE — 82947 ASSAY GLUCOSE BLOOD QUANT: CPT

## 2025-02-06 PROCEDURE — 87040 BLOOD CULTURE FOR BACTERIA: CPT

## 2025-02-06 PROCEDURE — 6370000000 HC RX 637 (ALT 250 FOR IP)

## 2025-02-06 PROCEDURE — 99285 EMERGENCY DEPT VISIT HI MDM: CPT

## 2025-02-06 PROCEDURE — 6360000002 HC RX W HCPCS: Performed by: NURSE PRACTITIONER

## 2025-02-06 PROCEDURE — 6360000002 HC RX W HCPCS

## 2025-02-06 PROCEDURE — 1200000000 HC SEMI PRIVATE

## 2025-02-06 PROCEDURE — 96374 THER/PROPH/DIAG INJ IV PUSH: CPT

## 2025-02-06 PROCEDURE — 6360000002 HC RX W HCPCS: Performed by: STUDENT IN AN ORGANIZED HEALTH CARE EDUCATION/TRAINING PROGRAM

## 2025-02-06 PROCEDURE — 36415 COLL VENOUS BLD VENIPUNCTURE: CPT

## 2025-02-06 PROCEDURE — 87186 SC STD MICRODIL/AGAR DIL: CPT

## 2025-02-06 PROCEDURE — 74176 CT ABD & PELVIS W/O CONTRAST: CPT

## 2025-02-06 PROCEDURE — 84132 ASSAY OF SERUM POTASSIUM: CPT

## 2025-02-06 PROCEDURE — 84484 ASSAY OF TROPONIN QUANT: CPT

## 2025-02-06 PROCEDURE — 83880 ASSAY OF NATRIURETIC PEPTIDE: CPT

## 2025-02-06 PROCEDURE — 85025 COMPLETE CBC W/AUTO DIFF WBC: CPT

## 2025-02-06 PROCEDURE — 6370000000 HC RX 637 (ALT 250 FOR IP): Performed by: STUDENT IN AN ORGANIZED HEALTH CARE EDUCATION/TRAINING PROGRAM

## 2025-02-06 PROCEDURE — 2580000003 HC RX 258: Performed by: STUDENT IN AN ORGANIZED HEALTH CARE EDUCATION/TRAINING PROGRAM

## 2025-02-06 PROCEDURE — 87205 SMEAR GRAM STAIN: CPT

## 2025-02-06 PROCEDURE — 99222 1ST HOSP IP/OBS MODERATE 55: CPT | Performed by: STUDENT IN AN ORGANIZED HEALTH CARE EDUCATION/TRAINING PROGRAM

## 2025-02-06 PROCEDURE — 87154 CUL TYP ID BLD PTHGN 6+ TRGT: CPT

## 2025-02-06 PROCEDURE — 80053 COMPREHEN METABOLIC PANEL: CPT

## 2025-02-06 PROCEDURE — 83605 ASSAY OF LACTIC ACID: CPT

## 2025-02-06 PROCEDURE — 83690 ASSAY OF LIPASE: CPT

## 2025-02-06 PROCEDURE — 2580000003 HC RX 258

## 2025-02-06 PROCEDURE — 2500000003 HC RX 250 WO HCPCS

## 2025-02-06 RX ORDER — 0.9 % SODIUM CHLORIDE 0.9 %
1000 INTRAVENOUS SOLUTION INTRAVENOUS ONCE
Status: COMPLETED | OUTPATIENT
Start: 2025-02-06 | End: 2025-02-07

## 2025-02-06 RX ORDER — INSULIN GLARGINE 100 [IU]/ML
10 INJECTION, SOLUTION SUBCUTANEOUS NIGHTLY
Status: DISCONTINUED | OUTPATIENT
Start: 2025-02-06 | End: 2025-02-07 | Stop reason: HOSPADM

## 2025-02-06 RX ORDER — SODIUM CHLORIDE 0.9 % (FLUSH) 0.9 %
5-40 SYRINGE (ML) INJECTION EVERY 12 HOURS SCHEDULED
Status: DISCONTINUED | OUTPATIENT
Start: 2025-02-06 | End: 2025-02-07 | Stop reason: HOSPADM

## 2025-02-06 RX ORDER — INSULIN LISPRO 100 [IU]/ML
0-8 INJECTION, SOLUTION INTRAVENOUS; SUBCUTANEOUS
Status: DISCONTINUED | OUTPATIENT
Start: 2025-02-06 | End: 2025-02-07 | Stop reason: HOSPADM

## 2025-02-06 RX ORDER — POTASSIUM CHLORIDE 7.45 MG/ML
10 INJECTION INTRAVENOUS PRN
Status: DISCONTINUED | OUTPATIENT
Start: 2025-02-06 | End: 2025-02-07 | Stop reason: HOSPADM

## 2025-02-06 RX ORDER — SODIUM CHLORIDE 9 MG/ML
INJECTION, SOLUTION INTRAVENOUS CONTINUOUS
Status: DISCONTINUED | OUTPATIENT
Start: 2025-02-06 | End: 2025-02-07 | Stop reason: HOSPADM

## 2025-02-06 RX ORDER — DEXTROSE MONOHYDRATE 100 MG/ML
INJECTION, SOLUTION INTRAVENOUS CONTINUOUS PRN
Status: DISCONTINUED | OUTPATIENT
Start: 2025-02-06 | End: 2025-02-07 | Stop reason: HOSPADM

## 2025-02-06 RX ORDER — MAGNESIUM SULFATE IN WATER 40 MG/ML
2000 INJECTION, SOLUTION INTRAVENOUS PRN
Status: DISCONTINUED | OUTPATIENT
Start: 2025-02-06 | End: 2025-02-07 | Stop reason: HOSPADM

## 2025-02-06 RX ORDER — POTASSIUM CHLORIDE 1500 MG/1
40 TABLET, EXTENDED RELEASE ORAL PRN
Status: DISCONTINUED | OUTPATIENT
Start: 2025-02-06 | End: 2025-02-07 | Stop reason: HOSPADM

## 2025-02-06 RX ORDER — SODIUM CHLORIDE 9 MG/ML
INJECTION, SOLUTION INTRAVENOUS PRN
Status: DISCONTINUED | OUTPATIENT
Start: 2025-02-06 | End: 2025-02-07 | Stop reason: HOSPADM

## 2025-02-06 RX ORDER — ATORVASTATIN CALCIUM 10 MG/1
10 TABLET, FILM COATED ORAL NIGHTLY
Status: CANCELLED | OUTPATIENT
Start: 2025-02-06

## 2025-02-06 RX ORDER — GLIPIZIDE 5 MG/1
10 TABLET ORAL
Status: DISCONTINUED | OUTPATIENT
Start: 2025-02-07 | End: 2025-02-07 | Stop reason: HOSPADM

## 2025-02-06 RX ORDER — ACETAMINOPHEN 325 MG/1
650 TABLET ORAL EVERY 6 HOURS PRN
Status: DISCONTINUED | OUTPATIENT
Start: 2025-02-06 | End: 2025-02-07 | Stop reason: HOSPADM

## 2025-02-06 RX ORDER — HEPARIN SODIUM 5000 [USP'U]/ML
5000 INJECTION, SOLUTION INTRAVENOUS; SUBCUTANEOUS EVERY 8 HOURS SCHEDULED
Status: DISCONTINUED | OUTPATIENT
Start: 2025-02-06 | End: 2025-02-07 | Stop reason: HOSPADM

## 2025-02-06 RX ORDER — ACETAMINOPHEN 650 MG/1
650 SUPPOSITORY RECTAL EVERY 6 HOURS PRN
Status: DISCONTINUED | OUTPATIENT
Start: 2025-02-06 | End: 2025-02-07 | Stop reason: HOSPADM

## 2025-02-06 RX ORDER — ONDANSETRON 4 MG/1
4 TABLET, ORALLY DISINTEGRATING ORAL EVERY 8 HOURS PRN
Status: DISCONTINUED | OUTPATIENT
Start: 2025-02-06 | End: 2025-02-07 | Stop reason: HOSPADM

## 2025-02-06 RX ORDER — POLYETHYLENE GLYCOL 3350 17 G/17G
17 POWDER, FOR SOLUTION ORAL DAILY PRN
Status: DISCONTINUED | OUTPATIENT
Start: 2025-02-06 | End: 2025-02-07 | Stop reason: HOSPADM

## 2025-02-06 RX ORDER — LABETALOL HYDROCHLORIDE 5 MG/ML
20 INJECTION, SOLUTION INTRAVENOUS EVERY 4 HOURS PRN
Status: DISCONTINUED | OUTPATIENT
Start: 2025-02-06 | End: 2025-02-07 | Stop reason: HOSPADM

## 2025-02-06 RX ORDER — GLUCAGON 1 MG/ML
1 KIT INJECTION PRN
Status: DISCONTINUED | OUTPATIENT
Start: 2025-02-06 | End: 2025-02-07 | Stop reason: HOSPADM

## 2025-02-06 RX ORDER — SODIUM CHLORIDE 0.9 % (FLUSH) 0.9 %
5-40 SYRINGE (ML) INJECTION PRN
Status: DISCONTINUED | OUTPATIENT
Start: 2025-02-06 | End: 2025-02-07 | Stop reason: HOSPADM

## 2025-02-06 RX ORDER — ONDANSETRON 2 MG/ML
4 INJECTION INTRAMUSCULAR; INTRAVENOUS EVERY 6 HOURS PRN
Status: DISCONTINUED | OUTPATIENT
Start: 2025-02-06 | End: 2025-02-07 | Stop reason: HOSPADM

## 2025-02-06 RX ADMIN — WATER 1000 MG: 1 INJECTION INTRAMUSCULAR; INTRAVENOUS; SUBCUTANEOUS at 14:55

## 2025-02-06 RX ADMIN — Medication 20 MG: at 20:51

## 2025-02-06 RX ADMIN — SODIUM CHLORIDE: 9 INJECTION, SOLUTION INTRAVENOUS at 19:48

## 2025-02-06 RX ADMIN — SODIUM CHLORIDE 1000 ML: 9 INJECTION, SOLUTION INTRAVENOUS at 14:55

## 2025-02-06 RX ADMIN — INSULIN LISPRO 2 UNITS: 100 INJECTION, SOLUTION INTRAVENOUS; SUBCUTANEOUS at 20:22

## 2025-02-06 RX ADMIN — SODIUM ZIRCONIUM CYCLOSILICATE 10 G: 10 POWDER, FOR SUSPENSION ORAL at 16:34

## 2025-02-06 RX ADMIN — INSULIN GLARGINE 10 UNITS: 100 INJECTION, SOLUTION SUBCUTANEOUS at 20:22

## 2025-02-06 RX ADMIN — HEPARIN SODIUM 5000 UNITS: 5000 INJECTION INTRAVENOUS; SUBCUTANEOUS at 20:22

## 2025-02-06 ASSESSMENT — ENCOUNTER SYMPTOMS
COUGH: 0
SHORTNESS OF BREATH: 0
SINUS PAIN: 0
NAUSEA: 0
BACK PAIN: 1
VOMITING: 0
CONSTIPATION: 0
BACK PAIN: 0
VOMITING: 0
DIARRHEA: 0
EYE PAIN: 0
RHINORRHEA: 0
DIARRHEA: 0
ABDOMINAL PAIN: 0
COUGH: 0
SHORTNESS OF BREATH: 0
NAUSEA: 0
CONSTIPATION: 0
COLOR CHANGE: 0
ABDOMINAL PAIN: 0

## 2025-02-06 ASSESSMENT — LIFESTYLE VARIABLES: HOW OFTEN DO YOU HAVE A DRINK CONTAINING ALCOHOL: NEVER

## 2025-02-06 ASSESSMENT — PAIN - FUNCTIONAL ASSESSMENT: PAIN_FUNCTIONAL_ASSESSMENT: NONE - DENIES PAIN

## 2025-02-06 NOTE — ED NOTES
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      Present to ED from: Home    MOA:     LOC: alert and orientated to name, place, date  Mobility: Requires assistance * 1  Oxygen Baseline: RA    Current needs required: RA   Pending ED orders: Potassium lab  Present condition: Patient is alert and oriented x3 and able to make his needs known.    Why did the patient come to the ED? Patient arrived to the ED with complaints of increased fatigue and UTI.   What is the plan? Admit for acute cystitis and elevated liver enzymes.    Any procedures or intervention occur? Labs, EKG, CT of abdomen and pelvis.   Any safety concerns??Fall risk.   CODE STATUS No Order  Diet No diet orders on file    Mental Status:  Level of Consciousness: Alert (0)    Psych Assessment:   Psychosocial  Psychosocial (WDL): Within Defined Limits  Vital signs   Vitals:    02/06/25 1607 02/06/25 1617 02/06/25 1700 02/06/25 1715   BP: (!) 148/125 (!) 140/71 (!) 150/58 (!) 140/72   Pulse: 79 74 77 79   Resp:       Temp:       TempSrc:       SpO2: 99% 98% 100% 99%   Weight:       Height:            Vitals:  Patient Vitals for the past 24 hrs:   BP Temp Temp src Pulse Resp SpO2 Height Weight   02/06/25 1715 (!) 140/72 -- -- 79 -- 99 % -- --   02/06/25 1700 (!) 150/58 -- -- 77 -- 100 % -- --   02/06/25 1617 (!) 140/71 -- -- 74 -- 98 % -- --   02/06/25 1607 (!) 148/125 -- -- 79 -- 99 % -- --   02/06/25 1517 (!) 146/73 -- -- 77 -- 98 % -- --   02/06/25 1412 (!) 144/63 98.4 °F (36.9 °C) Oral 90 16 99 % 1.778 m (5' 10\") 106.1 kg (234 lb)      Visit Vitals  BP (!) 140/72   Pulse 79   Temp 98.4 °F (36.9 °C) (Oral)   Resp 16   Ht 1.778 m (5' 10\")   Wt 106.1 kg (234 lb)   SpO2 99%   BMI 33.58 kg/m²        LDAs:   Peripheral IV 02/06/25 Left Antecubital  (Active)   Site Assessment Clean, dry & intact 02/06/25 1437   Line Status Blood return noted;Brisk blood return;Specimen collected;Flushed;Normal saline locked 02/06/25 1437   Phlebitis Assessment No symptoms 02/06/25 1437   Infiltration Assessment 0 02/06/25 1437   Dressing Status New dressing applied 02/06/25 1437   Dressing Type Transparent 02/06/25 1437   Dressing Intervention New 02/06/25 1437       Meds:  Medications   sodium zirconium cyclosilicate (LOKELMA) oral suspension 10 g (10 g Oral Given 2/6/25 1634)   cefTRIAXone (ROCEPHIN) 1,000 mg in sterile water 10 mL IV syringe (1,000 mg IntraVENous Given 2/6/25 1455)   sodium chloride 0.9 % bolus 1,000 mL (1,000 mLs IntraVENous New Bag 2/6/25 7195)          Ambulatory Status:  Presents to emergency department  because of falls (Syncope, seizure, or loss of consciousness): No, Age > 70: Yes, Altered Mental Status, Intoxication with alcohol or substance confusion (Disorientation, impaired judgment, poor safety awaremess, or inability to follow instructions): No, Impaired Mobility: Ambulates or transfers with assistive devices or assistance; Unable to ambulate or transer.: Yes, Nursing Judgement: Yes    Diagnosis:  DISPOSITION Admitted 02/06/2025 05:16:15 PM   Final diagnoses:   Acute cystitis without hematuria   Elevated liver enzymes            Assessment Abnormalities : (List)  Neuro: Confused   Yes[] No[x]    Respiratory : Labored  Yes[] No[x]  Lung Sounds Clear throughout    Cardiac:   Regular  Yes[x] No[]  Irregular Yes[] No[x]    RhythmNSR    :  Incontinent  Yes[x] No[]     Assessment Abnormalities Patient arrives with complaints of increased fatigue and UTI. Patient uses wheelchair for farther transfers and is a 1 assist. 20G IV in left AC.       Skin Assessment: Clean, dry and intact.        Pain Score:  Pain Assessment  Pain Assessment: None - Denies Pain    ISOLATION Status  No active isolations    Labs:  Labs Reviewed   CBC WITH AUTO DIFFERENTIAL -

## 2025-02-06 NOTE — ED PROVIDER NOTES
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          HISTORY OF PRESENT ILLNESS    Eber Bolden is a 74 y.o. male who presents to the emergency department sent by pcp for kidney failure, uti and elevated liver enzymes     HPI  74-year-old male who has complaints of generalized fatigue and falling episodes over the last week.  He was seen by his PCP.  They did urine and labs at that visit he was notified that he had some lab abnormalities and a probable UTI and should come to the ER for evaluation and likely IV antibiotic.  He had mildly worsening creatinine elevated potassium of 5.6 elevated ALT and AST and UTI, urine was sent for culture these tests were completed yesterday.  He is currently not on antibiotic.  He denies any nausea or vomiting no fevers or chills no abdominal pain.  He is unsure if he has had his gallbladder out or not.  No diarrhea or constipation, no dysuria    Nursing Notes were reviewed.    REVIEW OF SYSTEMS       Review of Systems   Constitutional:  Positive for fatigue. Negative for chills, diaphoresis and fever.   HENT:  Negative for congestion.    Eyes:  Negative for visual disturbance.   Respiratory:  Negative for cough and shortness of breath.    Cardiovascular:  Negative for chest pain and leg swelling.   Gastrointestinal:  Negative for abdominal pain, constipation, diarrhea, nausea and vomiting.   Genitourinary:  Negative for frequency and urgency.   Musculoskeletal:  Negative for back pain, myalgias and neck stiffness.   Skin:  Negative for color change and rash.   Neurological:  Negative  liver enzymes    3. Elevated troponin          DISPOSITION/PLAN   DISPOSITION Admitted 02/06/2025 05:16:15 PM               PATIENT REFERRED TO:  No follow-up provider specified.    DISCHARGE MEDICATIONS:  New Prescriptions    No medications on file     Controlled Substances Monitoring:          No data to display                (Please note that portions of this note were completed with a voice recognition program.  Efforts were made to edit the dictations but occasionally words are mis-transcribed.)    JAGJIT Dee CNP (electronically signed)  Attending Emergency Physician           Yari Vogel APRN - CNP  02/06/25 6552

## 2025-02-06 NOTE — H&P
[facility]  Office: [phone]  Tomasz Aguilar DO, Jesus Ayers DO, Bart Cortez DO, Roddy Zavala DO, Adrien Morton MD, Kalyn Bansal MD, Destiny Christian MD, Nisha Arvizu MD,  Jassi Isaac MD, Carla Estrada MD, Kris Burnett MD,  Alexandra Cesar DO, Cher Maciel MD, Stevie Cedeno MD, Carlos Aguilar DO, Serena Lopez MD,  Pepe Romeo DO, Marla Roberts MD, Harper Moses MD, Brandy Espino MD, Danyel Oliver MD,  Carlos Stacy MD, José Miguel Lyon MD, Clark Fan MD, Idania Farrell MD, Oliver Casanova MD, Sam Loza MD, Shree Sinha DO, Christ Cool MD, Alexandra Maki MD, Mohsin Reza, MD, Shirley Waterhouse, CNP,  Rochelle Olvera CNP, Shree Covarrubias, CNP,  Yuli Lee, SHWETA, Hermelinda Sewell, CNP, Julia Ferrera, CNP, Gracia Rollins, CNP, Padma Paige CNP, Amira Tavares PA-C, Mary Frost, CNP, Carol Alas, CNP,  Herlinda Fish, CNP, Matilda Raygoza, CNP, Laquita Johnson, CNP,  Chelsea Forrest, CNS, Gabriella Ruano CNP, Serenity Walsh, CNP,   Jennifer Sales, CNP         [facility]   IN-PATIENT SERVICE   [facility]    HISTORY AND PHYSICAL EXAMINATION            Date:   2/6/2025  Patient name:  Eber Bolden  Date of admission:  2/6/2025  2:07 PM  MRN:   [id number]  Account:  [id number]  YOB: 1950  PCP:    Jose De Jesus Lancaster PA-C  [facility]  Code Status:    No Order      History Obtained From:     patient    History of Present Illness:     Eber Bolden is a 74 y.o. male with a past medical of right nephrectomy (patient donated a kidney to his brother), DM2, HTN and HLD who presented to the emergency department on 2/6/2025 per the instruction of his primary care provider for UTI and JOESPH. The patient states that he has been feeling very fatigued for the past several days. A urinalysis and CMP were done by his PCP yesterday and he was instructed to go to the emergency department after being found to have a UTI as well as JOESPH with mild  hyperkalemia. In the ED, the patient was afebrile and nontoxic appearing. CMP was remarkable for JOESPH as well as transaminitis. CT scan of the abdomen and pelvis showed questionable peripancreatic stranding but was negative for an obstructive uropathy. The patient is admitted to internal medicine for further management of JOESPH and UTI.     Past Medical History:     Past Medical History:   Diagnosis Date    BOOP (bronchiolitis obliterans with organizing pneumonia) ([facility]) 04/2014    SUSPECTED CAUSE GROUND MOLD OR POLLEN SPORES, O2  AS NEEDED AT HOME    Chronic obstructive pulmonary disease ([facility]) 02/12/2014    Deep vein thrombosis of lower extremity ([facility]) 07/21/2023    Difficult intravenous access     STATES HAND TH E BEST PLACE TO START    Hx of blood clots 2014    BILAT LEGS AND LUNG TREATED WITH COUMADIN    HX OTHER MEDICAL     BOOP Bronchial oblierate pneumonia    Hypertension     IN PAST RESOLVED NOW    Obesity     On supplemental oxygen therapy     O2 3L AS NEEDED AT HOME STATES NOT USING OFTEN    Pulmonary embolism ([facility]) 07/21/2023    Sleep apnea 2014    USES C-PAP NIGHTLY-INSTRUCTED TO BRING    Type II or unspecified type diabetes mellitus without mention of complication, not stated as uncontrolled 2011    Wears glasses         Past Surgical History:     Past Surgical History:   Procedure Laterality Date    EYE SURGERY Bilateral 2015    CATARACT EXTRACTION WITH IOL    FIXATION KYPHOPLASTY  4/6/2016    L1-4    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    LUNG BIOPSY  2014        Medications Prior to Admission:     Prior to Admission medications    Medication Sig Start Date End Date Taking? Authorizing Provider   blood glucose test strips (ASCENSIA AUTODISC VI;ONE TOUCH ULTRA TEST VI) strip     Abdelrahman Mcghee MD   blood glucose test strips (EXACTECH TEST) strip     Abdelrahman Mcghee MD   Insulin Pen Needle 31G X 5 MM MISC     Provider, Historical, MD   Handicap Placard MISC by Does not apply route  2:35 PM   Result Value Ref Range    Troponin, High Sensitivity 52 (HH) 0 - 22 ng/L   Brain Natriuretic Peptide    Collection Time: 02/06/25  2:35 PM   Result Value Ref Range    NT Pro- <300 pg/mL   Lipase    Collection Time: 02/06/25  2:35 PM   Result Value Ref Range    Lipase 41 13 - 60 U/L   Troponin    Collection Time: 02/06/25  4:08 PM   Result Value Ref Range    Troponin, High Sensitivity 50 (H) 0 - 22 ng/L       Imaging/Diagnostics:  CT ABDOMEN PELVIS WO CONTRAST Additional Contrast? None    Result Date: 2/6/2025  1. Questionable peripancreatic stranding. Correlate with pancreatic enzymes. 2. Multiple small lung nodules.     XR CHEST STANDARD (2 VW)    Result Date: 2/5/2025  COPD with basilar stranding favoring chronic interstitial change. RECOMMENDATION: Suggest consideration be given to CT scanning of the chest given symptomatology and chronic lung findings.       Assessment :      Hospital Problems             Last Modified POA    * (Principal) Acute cystitis without hematuria 2/6/2025 Yes    CKD (chronic kidney disease) stage 3, GFR 30-59 ml/min ([facility]) 2/6/2025 Yes    HTN (hypertension) 2/6/2025 Yes    Type 2 diabetes mellitus (HCC) 2/6/2025 Yes    Transaminitis 2/6/2025 Yes       Plan:     Acute cystitis without hematuria   -Ceftriaxone 1 gram q24h   -Maintenance fluids at 100 mL/hr   -Urine culture pending     JOESPH on CKD 3b   -Secondary to volume depletion in the setting of UTI   -Maintenance fluids at 100 mL/hr   -Daily CMP     Transaminitis   -Liver US pending   -The patient denies abdominal pain   -EBV and CMV serologies pending   -Acute hepatitis panel pending     Elevated troponin   -Secondary to JOESPH   -The patient denies chest pain   -Echocardiogram pending     DM2   -Lantus 10 units nightly   -Sliding scale insulin   -Hypoglycemia protocol     HTN   -Holding home irbesartan in the setting of JOESPH     HLD   -Holding home atorvastatin in the setting of transaminitis       Patient is admitted as

## 2025-02-06 NOTE — ED PROVIDER NOTES
[facility] Emergency Department  [address]  [place] [address]  Phone: [phone]  Fax: [phone]      Attending Physician Attestation    I personally made/approves the management plan for this patient's and take responsibility for the patient management.    The patient is resting comfortably on the exam table.  He is neurologically intact.  He does have worsening renal insufficiency and he only has 1 kidney as he donated his other 1 to his brother.  Potassium is also concentrated.  We have hydrated with IV fluids which I think will certainly help with the hyperkalemia but we will also treat him with Lokelma.    (Please note that portions of this note were completed with a voice recognition program.  Efforts were made to edit the dictations but occasionally words are mis-transcribed.)    Roberto Kennedy MD  Attending Emergency Medicine Physician        Roberto Kennedy MD  02/06/25 [id number]

## 2025-02-07 ENCOUNTER — APPOINTMENT (OUTPATIENT)
Age: 75
End: 2025-02-07
Attending: STUDENT IN AN ORGANIZED HEALTH CARE EDUCATION/TRAINING PROGRAM
Payer: MEDICARE

## 2025-02-07 ENCOUNTER — APPOINTMENT (OUTPATIENT)
Dept: ULTRASOUND IMAGING | Age: 75
End: 2025-02-07
Attending: STUDENT IN AN ORGANIZED HEALTH CARE EDUCATION/TRAINING PROGRAM
Payer: MEDICARE

## 2025-02-07 VITALS
HEIGHT: 70 IN | HEART RATE: 84 BPM | TEMPERATURE: 99.7 F | WEIGHT: 234 LBS | OXYGEN SATURATION: 98 % | DIASTOLIC BLOOD PRESSURE: 81 MMHG | SYSTOLIC BLOOD PRESSURE: 152 MMHG | RESPIRATION RATE: 18 BRPM | BODY MASS INDEX: 33.5 KG/M2

## 2025-02-07 PROBLEM — R78.81 BACTEREMIA: Status: ACTIVE | Noted: 2025-02-07

## 2025-02-07 LAB
ALBUMIN SERPL-MCNC: 3.1 G/DL (ref 3.5–5.2)
ALBUMIN/GLOB SERPL: 0.8 {RATIO} (ref 1–2.5)
ALP SERPL-CCNC: 95 U/L (ref 40–129)
ALT SERPL-CCNC: 122 U/L (ref 5–41)
ANION GAP SERPL CALCULATED.3IONS-SCNC: 13 MMOL/L (ref 9–17)
AST SERPL-CCNC: 52 U/L
BASOPHILS # BLD: 0 K/UL (ref 0–0.2)
BASOPHILS NFR BLD: 0 % (ref 0–2)
BILIRUB SERPL-MCNC: 0.3 MG/DL (ref 0.3–1.2)
BUN SERPL-MCNC: 37 MG/DL (ref 8–23)
CALCIUM SERPL-MCNC: 8.7 MG/DL (ref 8.6–10.4)
CHLORIDE SERPL-SCNC: 107 MMOL/L (ref 98–107)
CO2 SERPL-SCNC: 20 MMOL/L (ref 20–31)
CREAT SERPL-MCNC: 1.8 MG/DL (ref 0.7–1.2)
EOSINOPHIL # BLD: 0 K/UL (ref 0–0.4)
EOSINOPHILS RELATIVE PERCENT: 1 % (ref 1–4)
ERYTHROCYTE [DISTWIDTH] IN BLOOD BY AUTOMATED COUNT: 15.3 % (ref 12.5–15.4)
GFR, ESTIMATED: 39 ML/MIN/1.73M2
GLUCOSE BLD-MCNC: 122 MG/DL (ref 75–110)
GLUCOSE BLD-MCNC: 250 MG/DL (ref 75–110)
GLUCOSE SERPL-MCNC: 150 MG/DL (ref 70–99)
HAV IGM SERPL QL IA: NONREACTIVE
HBV CORE IGM SERPL QL IA: NONREACTIVE
HBV SURFACE AG SERPL QL IA: NONREACTIVE
HCT VFR BLD AUTO: 29 % (ref 41–53)
HCV AB SERPL QL IA: NONREACTIVE
HGB BLD-MCNC: 9.9 G/DL (ref 13.5–17.5)
LYMPHOCYTES NFR BLD: 1.2 K/UL (ref 1–4.8)
LYMPHOCYTES RELATIVE PERCENT: 14 % (ref 24–44)
MCH RBC QN AUTO: 36.4 PG (ref 26–34)
MCHC RBC AUTO-ENTMCNC: 34.1 G/DL (ref 31–37)
MCV RBC AUTO: 106.7 FL (ref 80–100)
MICROORGANISM SPEC CULT: ABNORMAL
MONOCYTES NFR BLD: 0.6 K/UL (ref 0.1–1.2)
MONOCYTES NFR BLD: 7 % (ref 2–11)
NEUTROPHILS NFR BLD: 78 % (ref 36–66)
NEUTS SEG NFR BLD: 6.6 K/UL (ref 1.8–7.7)
PLATELET # BLD AUTO: 267 K/UL (ref 140–450)
PMV BLD AUTO: 6.9 FL (ref 6–12)
POTASSIUM SERPL-SCNC: 5 MMOL/L (ref 3.7–5.3)
PROT SERPL-MCNC: 6.9 G/DL (ref 6.4–8.3)
RBC # BLD AUTO: 2.72 M/UL (ref 4.5–5.9)
SODIUM SERPL-SCNC: 140 MMOL/L (ref 135–144)
SPECIMEN DESCRIPTION: ABNORMAL
WBC OTHER # BLD: 8.4 K/UL (ref 3.5–11)

## 2025-02-07 PROCEDURE — 99238 HOSP IP/OBS DSCHRG MGMT 30/<: CPT | Performed by: STUDENT IN AN ORGANIZED HEALTH CARE EDUCATION/TRAINING PROGRAM

## 2025-02-07 PROCEDURE — 6360000002 HC RX W HCPCS: Performed by: STUDENT IN AN ORGANIZED HEALTH CARE EDUCATION/TRAINING PROGRAM

## 2025-02-07 PROCEDURE — 36415 COLL VENOUS BLD VENIPUNCTURE: CPT

## 2025-02-07 PROCEDURE — 97162 PT EVAL MOD COMPLEX 30 MIN: CPT

## 2025-02-07 PROCEDURE — 82947 ASSAY GLUCOSE BLOOD QUANT: CPT

## 2025-02-07 PROCEDURE — 2580000003 HC RX 258: Performed by: STUDENT IN AN ORGANIZED HEALTH CARE EDUCATION/TRAINING PROGRAM

## 2025-02-07 PROCEDURE — 86665 EPSTEIN-BARR CAPSID VCA: CPT

## 2025-02-07 PROCEDURE — 85025 COMPLETE CBC W/AUTO DIFF WBC: CPT

## 2025-02-07 PROCEDURE — 76705 ECHO EXAM OF ABDOMEN: CPT

## 2025-02-07 PROCEDURE — 97535 SELF CARE MNGMENT TRAINING: CPT

## 2025-02-07 PROCEDURE — 80053 COMPREHEN METABOLIC PANEL: CPT

## 2025-02-07 PROCEDURE — 87040 BLOOD CULTURE FOR BACTERIA: CPT

## 2025-02-07 PROCEDURE — 6370000000 HC RX 637 (ALT 250 FOR IP): Performed by: STUDENT IN AN ORGANIZED HEALTH CARE EDUCATION/TRAINING PROGRAM

## 2025-02-07 PROCEDURE — 80074 ACUTE HEPATITIS PANEL: CPT

## 2025-02-07 PROCEDURE — 97166 OT EVAL MOD COMPLEX 45 MIN: CPT

## 2025-02-07 PROCEDURE — 97116 GAIT TRAINING THERAPY: CPT

## 2025-02-07 RX ORDER — CEPHALEXIN 500 MG/1
500 CAPSULE ORAL 3 TIMES DAILY
Qty: 42 CAPSULE | Refills: 0 | Status: SHIPPED | OUTPATIENT
Start: 2025-02-07 | End: 2025-02-21

## 2025-02-07 RX ADMIN — SODIUM ZIRCONIUM CYCLOSILICATE 10 G: 10 POWDER, FOR SUSPENSION ORAL at 08:42

## 2025-02-07 RX ADMIN — CEFEPIME 2000 MG: 2 INJECTION, POWDER, FOR SOLUTION INTRAVENOUS at 10:45

## 2025-02-07 RX ADMIN — INSULIN LISPRO 4 UNITS: 100 INJECTION, SOLUTION INTRAVENOUS; SUBCUTANEOUS at 11:36

## 2025-02-07 RX ADMIN — GLIPIZIDE 10 MG: 5 TABLET ORAL at 08:42

## 2025-02-07 RX ADMIN — HEPARIN SODIUM 5000 UNITS: 5000 INJECTION INTRAVENOUS; SUBCUTANEOUS at 05:53

## 2025-02-07 NOTE — PROGRESS NOTES
Physical Therapy  Facility/Department: [address]   Physical Therapy Daily Treatment Note    Patient Name: Eber Bolden        MRN: [id number]    : 1950    Date of Service: 2025    Chief Complaint   Patient presents with    Fatigue     Has been feeling weak and tired 2 weeks; seen yesterday at PCP for testing and found had UTI on urine test; hx of Rt kidney removed years ago as donation.      Past Medical History:  has a past medical history of BOOP (bronchiolitis obliterans with organizing pneumonia) ([facility]), Chronic obstructive pulmonary disease (HCC), Deep vein thrombosis of lower extremity (HCC), Difficult intravenous access, Hx of blood clots, HX OTHER MEDICAL, Hypertension, Obesity, On supplemental oxygen therapy, Pulmonary embolism (HCC), Sleep apnea, Type II or unspecified type diabetes mellitus without mention of complication, not stated as uncontrolled, and Wears glasses.  Past Surgical History:  has a past surgical history that includes Kidney donation (); Lung biopsy (); eye surgery (Bilateral, ); and Fixation Kyphoplasty (2016).    Discharge Recommendations  Discharge Recommendations: Patient would benefit from continued therapy after discharge  PT Equipment Recommendations  Equipment Needed: No    Assessment  Body Structures, Functions, Activity Limitations Requiring Skilled Therapeutic Intervention: Decreased functional mobility ;Decreased balance;Decreased strength;Decreased safe awareness  Assessment: Pt presents with generalized weakness and impaired mobility due to admission for Acute cystitis. Pt lives with spouse in one story home; Independent mobility with rollator most of the time at baseline. Currently, pt is CGA for all mobility and ambulated with RW and without device 100'+40' respectively. Pt should be able to return home with assistance as needed. Pt may benefit from further therapy upon discharge.  Therapy Prognosis: Good  Decision Making: Medium  consistently  Attention Span: Appears intact  Memory: Decreased short term memory  Safety Judgement: Decreased awareness of need for safety;Decreased awareness of need for assistance  Problem Solving: Assistance required to implement solutions;Assistance required to correct errors made;Assistance required to generate solutions;Assistance required to identify errors made;Decreased awareness of errors  Insights: Decreased awareness of deficits  Initiation: Appears intact  Sequencing: Requires cues for some         AROM: Within functional limits  PROM: Within functional limits  AROM RLE (degrees)  RLE AROM: WFL  AROM LLE (degrees)  LLE AROM : WFL    Strength RLE  Strength RLE: WFL  Strength LLE  Strength LLE: WFL  Strength: Within functional limits    Mobility   Bed mobility  Supine to Sit: Minimal assistance  Scooting: Contact guard assistance  Bed Mobility Comments: HOB lowered to simulate home environment; pt requesting to pull from therapist for supine>sit transfer; retired to recliner following eval         Transfers  Sit to Stand: Contact guard assistance  Stand to Sit: Contact guard assistance  Stand Pivot Transfers: Contact guard assistance  Comment: Transfers with RW and without device; verbal cues for hand placement.    Ambulation  Surface: Level tile  Device: Rolling Walker  Assistance: Contact guard assistance  Quality of Gait: shortened step length and height, trunk slightly anterior to feet during ambulation (forward momentum)  Gait Deviations: Slow Florinda;Increased MORA;Decreased step height  Distance: 100'  Comments: No LOB noted.  More Ambulation?: Yes  Ambulation 2  Surface - 2: level tile  Device 2: No device  Assistance 2: Contact guard assistance  Quality of Gait 2: trunk more erect, arms slightly out in guard position  Gait Deviations: Slow Florinda;Increased MORA;Decreased step length;Decreased step height  Distance: 40'  Comments: Gait also performed without device as pt states he does not always

## 2025-02-07 NOTE — CARE COORDINATION
Case Management Assessment  Initial Evaluation    Date/Time of Evaluation: 2/7/2025 12:31 PM  Assessment Completed by: May Fried    If patient is discharged prior to next notation, then this note serves as note for discharge by case management.    Patient Name: Eber Bolden                   YOB: 1950  Diagnosis: Elevated troponin [R79.89]  Elevated liver enzymes [R74.8]  Acute cystitis without hematuria [N30.00]                   Date / Time: 2/6/2025  2:07 PM    Patient Admission Status: Inpatient   Readmission Risk (Low < 19, Mod (19-27), High > 27): Readmission Risk Score: 15.6    Current PCP: Jose De Jesus Lancaster PA-C  PCP verified by CM? (P) Yes    Chart Reviewed: Yes      History Provided by: (P) Patient  Patient Orientation: (P) Alert and Oriented    Patient Cognition: (P) Alert    Hospitalization in the last 30 days (Readmission):  No    If yes, Readmission Assessment in CM Navigator will be completed.    Advance Directives:      Code Status: Full Code   Patient's Primary Decision Maker is: (P) Legal Next of Kin    Primary Decision Maker: Audra Bolden - Spouse - [phone]    Discharge Planning:    Patient lives with: (P) Spouse/Significant Other Type of Home: (P) House  Primary Care Giver: (P) Self  Patient Support Systems include: (P) Spouse/Significant Other, Children, Family Members   Current Financial resources: (P) Medicare  Current community resources: (P) None  Current services prior to admission: (P) Durable Medical Equipment            Current DME: (P) Walker            Type of Home Care services:  (P) None    ADLS  Prior functional level: (P) Independent in ADLs/IADLs  Current functional level: (P) Independent in ADLs/IADLs    PT AM-PAC:   /24  OT AM-PAC: 18 /24    Family can provide assistance at DC: (P) Yes  Would you like Case Management to discuss the discharge plan with any other family members/significant others, and if so, who? (P) No  Plans to Return to Present Housing: (P)

## 2025-02-07 NOTE — PLAN OF CARE
Problem: Chronic Conditions and Co-morbidities  Goal: Patient's chronic conditions and co-morbidity symptoms are monitored and maintained or improved  Outcome: Progressing  Flowsheets (Taken 2/6/2025 1957)  Care Plan - Patient's Chronic Conditions and Co-Morbidity Symptoms are Monitored and Maintained or Improved:   Monitor and assess patient's chronic conditions and comorbid symptoms for stability, deterioration, or improvement   Collaborate with multidisciplinary team to address chronic and comorbid conditions and prevent exacerbation or deterioration     Problem: Discharge Planning  Goal: Discharge to home or other facility with appropriate resources  Outcome: Progressing  Flowsheets (Taken 2/6/2025 1957)  Discharge to home or other facility with appropriate resources:   Identify barriers to discharge with patient and caregiver   Arrange for needed discharge resources and transportation as appropriate   Identify discharge learning needs (meds, wound care, etc)     Problem: Skin/Tissue Integrity  Goal: Skin integrity remains intact  Description: 1.  Monitor for areas of redness and/or skin breakdown  2.  Assess vascular access sites hourly  3.  Every 4-6 hours minimum:  Change oxygen saturation probe site  4.  Every 4-6 hours:  If on nasal continuous positive airway pressure, respiratory therapy assess nares and determine need for appliance change or resting period  Outcome: Progressing

## 2025-02-07 NOTE — DISCHARGE SUMMARY
[facility]  Office: [phone]  Tomasz Aguilar DO, Jesus Ayers DO, Bart Cortez DO, Roddy Zavala DO, Adrien Morton MD, Kalyn Bansal MD, Destiny Christian MD, Nisha Arvizu MD,  Jassi Isaac MD, Carla Estrada MD, Kris Burnett MD,  Alexandra Cesar DO, Cher Maciel MD, Stevie Cedeno MD, Carlos Aguilar DO, Serena Lopez MD,  Pepe Romeo DO, Marla Roberts MD, Harper Moses MD, Brandy Espino MD, Danyel Oliver MD,  Carlos Stacy MD, José Miguel Lyon MD, Clark Fan MD, Idania Farrell MD, Oliver Casanova MD, Sam Loza MD, Shree Sinha DO, Christ Cool MD, Alexandra Maki MD, Mohsin Reza, MD, Shirley Waterhouse, CNP,  Rochelle Olvera CNP, Shree Covarrubias, CNP,  Yuli Lee, [facility], Hermelinda Sewell, CNP, Julia Ferrera, CNP, Gracia Rollins, CNP, Padma Paige, CNP, Amira Tavares PA-C, Mary Frost, CNP, Carol Alas, CNP,  Herlinda Fish, CNP, Matilda Raygoza, CNP, Laquita Johnson, CNP,  Chelsea Forrest, CNS, Gabriella Ruano CNP, Serenity Walsh, CNP,   Jennifer Sales, CNP         [facility]   IN-PATIENT SERVICE   [facility]    Discharge Summary     Patient ID: Eber Bolden  :  1950   MRN: [id number]     ACCOUNT:  [id number]   Patient's PCP: Jose De Jesus Lancaster PA-C  Admit Date: 2025   Discharge Date: 2025  Length of Stay: 1  Code Status:  Full Code  Admitting Physician: Stevie Cedeno MD  Discharge Physician: Stevie Cedeno MD     Active Discharge Diagnoses:     Hospital Problem Lists:  Principal Problem:    Acute cystitis without hematuria  Active Problems:    CKD (chronic kidney disease) stage 3, GFR 30-59 ml/min (HCC)    HTN (hypertension)    Type 2 diabetes mellitus (HCC)    Transaminitis    Bacteremia  Resolved Problems:    * No resolved hospital problems. *      Admission Condition:  fair     Discharged Condition: good    Hospital Stay:     Hospital Course:  Eber Bolden is a 74 y.o. male with a past medical of right nephrectomy (patient donated

## 2025-02-07 NOTE — PROGRESS NOTES
Spiritual Health History and Assessment/Progress Note  [facility]    (P) Spiritual/Emotional Needs, Crisis, Initial Encounter, (P) Emotional distress, (P) Adjustment to illness, Life Adjustments,      Name: Eber Bolden MRN: [id number]    Age: 74 y.o.     Sex: male   Language: English   Orthodox: Baptist   Acute cystitis without hematuria     Date: 2/6/2025            Total Time Calculated: (P) 16 min              Spiritual Assessment began in [address]        Referral/Consult From: (P) Rounding   Encounter Overview/Reason: (P) Spiritual/Emotional Needs, Crisis, Initial Encounter  Service Provided For: (P) Patient        visited Pt. In ER during rounding. Pt. Was open to conversation and welcoming to . Pt. Was interested in discussing Family , gemma and  health care plan. provided support and pastoral care and prayer for strength.    Gemma, Belief, Meaning:   Patient has beliefs or practices that help with coping during difficult times  Family/Friends No family/friends present      Importance and Influence:  Patient has spiritual/personal beliefs that influence decisions regarding their health  Family/Friends No family/friends present    Community:  Patient feels well-supported. Support system includes: Spouse/Partner  Family/Friends feel well-supported. Support system includes: Spouse/Partner    Assessment and Plan of Care:     Patient Interventions include: Facilitated expression of thoughts and feelings and Explored spiritual coping/struggle/distress  Family/Friends Interventions include: Facilitated expression of thoughts and feelings, Explored spiritual coping/struggle/distress, and No family/friends present    Patient Plan of Care: Spiritual Care available upon further referral  Family/Friends Plan of Care: No family/friends present    Electronically signed by Chaplain PALLAVI on 2/6/2025 at 7:41 PM    02/06/25 1938   Encounter Summary   Encounter  Overview/Reason Spiritual/Emotional Needs;Crisis;Initial Encounter   Service Provided For Patient   Referral/Consult From WorkFlowy Spouse   Last Encounter  02/06/25   Complexity of Encounter Moderate   Begin Time 1450   End Time  1506   Total Time Calculated 16 min   Crisis   Type Emotional distress   Spiritual/Emotional needs   Type Spiritual Support   Grief, Loss, and Adjustments   Type Adjustment to illness;Life Adjustments   Assessment/Intervention/Outcome   Assessment Anxious;Coping;Hopeful   Intervention Prayer (assurance of)/Raywick;Sustaining Presence/Ministry of presence;Explored/Affirmed feelings, thoughts, concerns;Discussed belief system/Orthodoxy practices/tucker;Discussed meaning/purpose;Active listening   Outcome Comfort;Coping;Encouraged   Plan and Referrals   Plan/Referrals Continue to visit, (comment)        02/06/25 1938   Encounter Summary   Encounter Overview/Reason Spiritual/Emotional Needs;Crisis;Initial Encounter   Service Provided For Patient   Referral/Consult From WorkFlowy Spouse   Last Encounter  02/06/25   Complexity of Encounter Moderate   Begin Time 1450   End Time  1506   Total Time Calculated 16 min   Crisis   Type Emotional distress   Spiritual/Emotional needs   Type Spiritual Support   Grief, Loss, and Adjustments   Type Adjustment to illness;Life Adjustments   Assessment/Intervention/Outcome   Assessment Anxious;Coping;Hopeful   Intervention Prayer (assurance of)/Raywick;Sustaining Presence/Ministry of presence;Explored/Affirmed feelings, thoughts, concerns;Discussed belief system/Orthodoxy practices/tucker;Discussed meaning/purpose;Active listening   Outcome Comfort;Coping;Encouraged   Plan and Referrals   Plan/Referrals Continue to visit, (comment)

## 2025-02-07 NOTE — DISCHARGE INSTR - COC
Continuity of Care Form    Patient Name: Eber Bolden   :  1950  MRN:  [id number]    Admit date:  2025  Discharge date:  ***    Code Status Order: Full Code   Advance Directives:   Advance Care Flowsheet Documentation             Admitting Physician:  Stevie Cedeno MD  PCP: Jose De Jesus Lancaster PA-C    Discharging Nurse: ***  Discharging Hospital Unit/Room#: 313/313-01  Discharging Unit Phone Number: ***    Emergency Contact:   Extended Emergency Contact Information  Primary Emergency Contact: Audra Bolden  Address: [address] [place], [place] [address] [facility]  Home Phone: [phone]  Mobile Phone: [phone]  Relation: Spouse    Past Surgical History:  Past Surgical History:   Procedure Laterality Date    EYE SURGERY Bilateral     CATARACT EXTRACTION WITH IOL    FIXATION KYPHOPLASTY  2016    L1-4    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    LUNG BIOPSY         Immunization History:   Immunization History   Administered Date(s) Administered    COVID-19, MODERNA BLUE border, Primary or Immunocompromised, (age 12y+), IM, 100 mcg/0.5mL 2021, 2021    Influenza Virus Vaccine 2012, 2013, 2014, 10/07/2015, 10/12/2015    Influenza, FLUAD, (age 65 y+), IM, Quadv, 0.5mL 2023    Influenza, FLUAD, (age 65 y+), IM, Trivalent PF, 0.5mL 11/15/2024    Influenza, FLUZONE High Dose (age 65 y+), IM, Quadv, 0.7mL 2021, 2022    Influenza, FLUZONE High Dose, (age 65 y+), IM, Trivalent PF, 0.5mL 2018, 2019, 2020    Pneumococcal Conjugate 7-valent (Prevnar7) 10/07/2015    Pneumococcal, PPSV23, PNEUMOVAX 23, (age 2y+), SC/IM, 0.5mL 2014    TDaP, ADACEL (age 10y-64y), BOOSTRIX (age 10y+), IM, 0.5mL 2016    Zoster Recombinant (Shingrix) 2021       Active Problems:  Patient Active Problem List   Diagnosis Code    CKD (chronic kidney disease) stage 3, GFR 30-59 ml/min ([facility]) N18.30    HTN (hypertension) I10

## 2025-02-07 NOTE — PROGRESS NOTES
Occupational Therapy  Occupational Therapy Initial Evaluation  Facility/Department: [address]   Patient Name: Eber Bolden        MRN: [id number]    : 1950    Date of Service: 2025    Chief Complaint   Patient presents with    Fatigue     Has been feeling weak and tired 2 weeks; seen yesterday at PCP for testing and found had UTI on urine test; hx of Rt kidney removed years ago as donation.      Past Medical History:  has a past medical history of BOOP (bronchiolitis obliterans with organizing pneumonia) ([facility]), Chronic obstructive pulmonary disease (HCC), Deep vein thrombosis of lower extremity (HCC), Difficult intravenous access, Hx of blood clots, HX OTHER MEDICAL, Hypertension, Obesity, On supplemental oxygen therapy, Pulmonary embolism (HCC), Sleep apnea, Type II or unspecified type diabetes mellitus without mention of complication, not stated as uncontrolled, and Wears glasses.  Past Surgical History:  has a past surgical history that includes Kidney donation (); Lung biopsy (); eye surgery (Bilateral, ); and Fixation Kyphoplasty (2016).    Discharge Recommendations  Discharge Recommendations: Home with Home health OT  OT Equipment Recommendations  Other: AE/DME recommendations TBD    Assessment  Performance deficits / Impairments: Decreased functional mobility ;Decreased safe awareness;Decreased ADL status;Decreased balance;Decreased coordination;Decreased ROM;Decreased strength;Decreased endurance    Assessment: Pt seen for therapy eval s/p admission with UTI. PLOF includes living with spouse, IND all ADLs and assist for most IADLs with inconsistent use of RW for mobility.  At this time, pt is MIN A bed mobility, CGA sit<>stand and functional mobility with RW, MAX A LB dressing and appears to be safe to return to PLOF with assist as needed and continued services. Pt to benefit from continued therapy services while hospitalized and following discharge to support return to

## 2025-02-07 NOTE — PLAN OF CARE
Problem: Chronic Conditions and Co-morbidities  Goal: Patient's chronic conditions and co-morbidity symptoms are monitored and maintained or improved  2/7/2025 1431 by Cortney White LPN  Outcome: Progressing  Flowsheets (Taken 2/7/2025 0830)  Care Plan - Patient's Chronic Conditions and Co-Morbidity Symptoms are Monitored and Maintained or Improved:   Monitor and assess patient's chronic conditions and comorbid symptoms for stability, deterioration, or improvement   Collaborate with multidisciplinary team to address chronic and comorbid conditions and prevent exacerbation or deterioration   Update acute care plan with appropriate goals if chronic or comorbid symptoms are exacerbated and prevent overall improvement and discharge  2/7/2025 0042 by Edwar Lawson, RN  Outcome: Progressing  Flowsheets (Taken 2/6/2025 1957)  Care Plan - Patient's Chronic Conditions and Co-Morbidity Symptoms are Monitored and Maintained or Improved:   Monitor and assess patient's chronic conditions and comorbid symptoms for stability, deterioration, or improvement   Collaborate with multidisciplinary team to address chronic and comorbid conditions and prevent exacerbation or deterioration     Problem: Discharge Planning  Goal: Discharge to home or other facility with appropriate resources  2/7/2025 1431 by Cortney White LPN  Outcome: Progressing  Flowsheets (Taken 2/7/2025 0830)  Discharge to home or other facility with appropriate resources:   Identify barriers to discharge with patient and caregiver   Arrange for needed discharge resources and transportation as appropriate   Identify discharge learning needs (meds, wound care, etc)   Refer to discharge planning if patient needs post-hospital services based on physician order or complex needs related to functional status, cognitive ability or social support system  2/7/2025 0042 by Edwar Lawson, RN  Outcome: Progressing  Flowsheets (Taken 2/6/2025 1957)  Discharge to home or other

## 2025-02-08 LAB
MICROORGANISM SPEC CULT: ABNORMAL
SERVICE CMNT-IMP: ABNORMAL
SPECIMEN DESCRIPTION: ABNORMAL

## 2025-02-09 LAB
MICROORGANISM SPEC CULT: NORMAL
SERVICE CMNT-IMP: NORMAL
SPECIMEN DESCRIPTION: NORMAL

## 2025-02-10 LAB — EBV VCA IGM SER-ACNC: 5 U/ML

## 2025-02-11 LAB
MICROORGANISM SPEC CULT: NORMAL
SERVICE CMNT-IMP: NORMAL
SPECIMEN DESCRIPTION: NORMAL

## 2025-02-12 ENCOUNTER — HOSPITAL ENCOUNTER (OUTPATIENT)
Age: 75
Setting detail: SPECIMEN
Discharge: HOME | End: 2025-02-12

## 2025-02-12 ENCOUNTER — TELEPHONE (OUTPATIENT)
Dept: PRIMARY CARE CLINIC | Age: 75
End: 2025-02-12

## 2025-02-12 ENCOUNTER — OFFICE VISIT (OUTPATIENT)
Dept: PRIMARY CARE CLINIC | Age: 75
End: 2025-02-12

## 2025-02-12 VITALS
HEART RATE: 66 BPM | DIASTOLIC BLOOD PRESSURE: 62 MMHG | WEIGHT: 232 LBS | HEIGHT: 70 IN | BODY MASS INDEX: 33.21 KG/M2 | OXYGEN SATURATION: 100 % | SYSTOLIC BLOOD PRESSURE: 122 MMHG

## 2025-02-12 DIAGNOSIS — D64.9 ANEMIA, UNSPECIFIED TYPE: ICD-10-CM

## 2025-02-12 DIAGNOSIS — N39.0 ACUTE UTI: ICD-10-CM

## 2025-02-12 DIAGNOSIS — R26.9 GAIT ABNORMALITY: ICD-10-CM

## 2025-02-12 DIAGNOSIS — R53.81 MALAISE AND FATIGUE: ICD-10-CM

## 2025-02-12 DIAGNOSIS — J44.9 CHRONIC OBSTRUCTIVE PULMONARY DISEASE, UNSPECIFIED COPD TYPE (HCC): ICD-10-CM

## 2025-02-12 DIAGNOSIS — R53.83 MALAISE AND FATIGUE: ICD-10-CM

## 2025-02-12 DIAGNOSIS — Z09 HOSPITAL DISCHARGE FOLLOW-UP: Primary | ICD-10-CM

## 2025-02-12 DIAGNOSIS — N18.31 STAGE 3A CHRONIC KIDNEY DISEASE (HCC): ICD-10-CM

## 2025-02-12 LAB
ALBUMIN SERPL-MCNC: 3.9 G/DL (ref 3.5–5.2)
ALBUMIN/GLOB SERPL: 1.1 {RATIO} (ref 1–2.5)
ALP SERPL-CCNC: 104 U/L (ref 40–129)
ALT SERPL-CCNC: 152 U/L (ref 10–50)
ANION GAP SERPL CALCULATED.3IONS-SCNC: 10 MMOL/L (ref 9–16)
AST SERPL-CCNC: 64 U/L (ref 10–50)
BASOPHILS # BLD: 0 K/UL (ref 0–0.2)
BASOPHILS NFR BLD: 0 % (ref 0–2)
BILIRUB SERPL-MCNC: 0.4 MG/DL (ref 0–1.2)
BUN SERPL-MCNC: 40 MG/DL (ref 8–23)
CALCIUM SERPL-MCNC: 9.4 MG/DL (ref 8.6–10.4)
CHLORIDE SERPL-SCNC: 103 MMOL/L (ref 98–107)
CO2 SERPL-SCNC: 23 MMOL/L (ref 20–31)
CREAT SERPL-MCNC: 2.1 MG/DL (ref 0.7–1.2)
EOSINOPHIL # BLD: 0 K/UL (ref 0–0.4)
EOSINOPHILS RELATIVE PERCENT: 0 % (ref 1–4)
ERYTHROCYTE [DISTWIDTH] IN BLOOD BY AUTOMATED COUNT: 14.1 % (ref 11.8–14.4)
FERRITIN SERPL-MCNC: 608 NG/ML
FOLATE SERPL-MCNC: 17.9 NG/ML (ref 4.8–24.2)
GFR, ESTIMATED: 32 ML/MIN/1.73M2
GLUCOSE P FAST SERPL-MCNC: 141 MG/DL (ref 74–99)
HCT VFR BLD AUTO: 37.1 % (ref 40.7–50.3)
HGB BLD-MCNC: 11.6 G/DL (ref 13–17)
IMM GRANULOCYTES # BLD AUTO: 0 K/UL (ref 0–0.3)
IMM GRANULOCYTES NFR BLD: 0 %
IRON SATN MFR SERPL: 35 % (ref 20–55)
IRON SERPL-MCNC: 78 UG/DL (ref 61–157)
LYMPHOCYTES NFR BLD: 2.05 K/UL (ref 1–4.8)
LYMPHOCYTES RELATIVE PERCENT: 22 % (ref 24–44)
MCH RBC QN AUTO: 34.7 PG (ref 25.2–33.5)
MCHC RBC AUTO-ENTMCNC: 31.3 G/DL (ref 28.4–34.8)
MCV RBC AUTO: 111.1 FL (ref 82.6–102.9)
MICROORGANISM SPEC CULT: NORMAL
MICROORGANISM SPEC CULT: NORMAL
MONOCYTES NFR BLD: 0.47 K/UL (ref 0.1–0.8)
MONOCYTES NFR BLD: 5 % (ref 1–7)
MORPHOLOGY: ABNORMAL
NEUTROPHILS NFR BLD: 73 % (ref 36–66)
NEUTS SEG NFR BLD: 6.78 K/UL (ref 1.8–7.7)
NRBC BLD-RTO: 0 PER 100 WBC
PLATELET # BLD AUTO: 344 K/UL (ref 138–453)
PMV BLD AUTO: 9.8 FL (ref 8.1–13.5)
POTASSIUM SERPL-SCNC: 7.5 MMOL/L (ref 3.7–5.3)
PROT SERPL-MCNC: 7.6 G/DL (ref 6.6–8.7)
RBC # BLD AUTO: 3.34 M/UL (ref 4.21–5.77)
SERVICE CMNT-IMP: NORMAL
SERVICE CMNT-IMP: NORMAL
SODIUM SERPL-SCNC: 136 MMOL/L (ref 136–145)
SPECIMEN DESCRIPTION: NORMAL
SPECIMEN DESCRIPTION: NORMAL
TIBC SERPL-MCNC: 223 UG/DL (ref 250–450)
UNSATURATED IRON BINDING CAPACITY: 145 UG/DL (ref 112–347)
VIT B12 SERPL-MCNC: <150 PG/ML (ref 232–1245)
WBC OTHER # BLD: 9.3 K/UL (ref 3.5–11.3)

## 2025-02-12 RX ORDER — BUDESONIDE AND FORMOTEROL FUMARATE DIHYDRATE 160; 4.5 UG/1; UG/1
2 AEROSOL RESPIRATORY (INHALATION) 2 TIMES DAILY
Qty: 10.2 G | Refills: 0 | Status: SHIPPED | OUTPATIENT
Start: 2025-02-12

## 2025-02-12 NOTE — PROGRESS NOTES
balance.  We discussed that shortness of breath.  Likely can be related to COPD and history of Boop.  Will consider Symbicort.  Patient will need to be seen for close follow-up.    Medical Decision Making: moderate complexity  Return in 1 month (on 3/12/2025).    On this date 2/12/2025 I have spent 30 minutes reviewing previous notes, test results and face to face with the patient discussing the diagnosis and importance of compliance with the treatment plan as well as documenting on the day of the visit.       Subjective:   HPI:  Follow up of Hospital problems/diagnosis(es):   UTI  JOESPH      Inpatient course: Discharge summary reviewed- see chart.    Interval history/Current status:   Patient was discharged from hospital.  We discussed this was likely too early.  Patient's wife reports he did not want to stay and they more so allowed him to leave based on his choice.  He continues to take antibiotic for UTI.  He is not quite sure if he is getting better.  He does feel that he is moving around his house more.  Patient reports very little hydration.  He is taking a few sips of water per day.  He still feels unsteady gait and some brain fog.  Plan to update blood work.  Depending on these results we will adjust our plan.  Discussed if any acute changes to go immediately back to the ER.    Patient Active Problem List   Diagnosis    CKD (chronic kidney disease) stage 3, GFR 30-59 ml/min (HCC)    HTN (hypertension)    Abnormal findings on diagnostic imaging of lung    Adjustment disorder with anxious mood    Arthritis    Chronic back pain    Gout    Generalized anxiety disorder    High prostate specific antigen (PSA)    Hypertriglyceridemia    Idiopathic osteoporosis    Vision disorder    Type 2 diabetes mellitus (HCC)    Proteinuria    Pneumonia    Obstructive sleep apnea syndrome    Obesity    Lipoprotein deficiency disorder    Acute cystitis without hematuria    Transaminitis    Bacteremia       Medications listed as

## 2025-02-13 ENCOUNTER — HOSPITAL ENCOUNTER (EMERGENCY)
Age: 75
Discharge: HOME | End: 2025-02-13
Attending: STUDENT IN AN ORGANIZED HEALTH CARE EDUCATION/TRAINING PROGRAM
Payer: MEDICARE

## 2025-02-13 VITALS
OXYGEN SATURATION: 99 % | SYSTOLIC BLOOD PRESSURE: 117 MMHG | HEART RATE: 71 BPM | HEIGHT: 69 IN | TEMPERATURE: 98.8 F | RESPIRATION RATE: 15 BRPM | DIASTOLIC BLOOD PRESSURE: 98 MMHG | BODY MASS INDEX: 34.66 KG/M2 | WEIGHT: 234 LBS

## 2025-02-13 DIAGNOSIS — N18.32 STAGE 3B CHRONIC KIDNEY DISEASE (HCC): ICD-10-CM

## 2025-02-13 DIAGNOSIS — E87.5 HYPERKALEMIA: Primary | ICD-10-CM

## 2025-02-13 LAB
ALBUMIN SERPL-MCNC: 3.4 G/DL (ref 3.5–5.2)
ALBUMIN/GLOB SERPL: 0.9 {RATIO} (ref 1–2.5)
ALP SERPL-CCNC: 86 U/L (ref 40–129)
ALT SERPL-CCNC: 96 U/L (ref 5–41)
ANION GAP SERPL CALCULATED.3IONS-SCNC: 10 MMOL/L (ref 9–17)
AST SERPL-CCNC: 41 U/L
BASOPHILS # BLD: 0 K/UL (ref 0–0.2)
BASOPHILS NFR BLD: 1 % (ref 0–2)
BILIRUB SERPL-MCNC: 0.3 MG/DL (ref 0.3–1.2)
BUN SERPL-MCNC: 37 MG/DL (ref 8–23)
CALCIUM SERPL-MCNC: 8.5 MG/DL (ref 8.6–10.4)
CHLORIDE SERPL-SCNC: 105 MMOL/L (ref 98–107)
CO2 SERPL-SCNC: 23 MMOL/L (ref 20–31)
CREAT SERPL-MCNC: 1.9 MG/DL (ref 0.7–1.2)
EOSINOPHIL # BLD: 0.1 K/UL (ref 0–0.4)
EOSINOPHILS RELATIVE PERCENT: 2 % (ref 1–4)
ERYTHROCYTE [DISTWIDTH] IN BLOOD BY AUTOMATED COUNT: 15.1 % (ref 12.5–15.4)
GFR, ESTIMATED: 36 ML/MIN/1.73M2
GLUCOSE BLD-MCNC: 160 MG/DL (ref 75–110)
GLUCOSE SERPL-MCNC: 175 MG/DL (ref 70–99)
HCT VFR BLD AUTO: 30.2 % (ref 41–53)
HGB BLD-MCNC: 10.3 G/DL (ref 13.5–17.5)
LYMPHOCYTES NFR BLD: 1.9 K/UL (ref 1–4.8)
LYMPHOCYTES RELATIVE PERCENT: 26 % (ref 24–44)
MCH RBC QN AUTO: 36.4 PG (ref 26–34)
MCHC RBC AUTO-ENTMCNC: 34.1 G/DL (ref 31–37)
MCV RBC AUTO: 106.6 FL (ref 80–100)
MONOCYTES NFR BLD: 0.4 K/UL (ref 0.1–1.2)
MONOCYTES NFR BLD: 6 % (ref 2–11)
NEUTROPHILS NFR BLD: 65 % (ref 36–66)
NEUTS SEG NFR BLD: 4.9 K/UL (ref 1.8–7.7)
PLATELET # BLD AUTO: 317 K/UL (ref 140–450)
PMV BLD AUTO: 7.1 FL (ref 6–12)
POTASSIUM SERPL-SCNC: 5.4 MMOL/L (ref 3.7–5.3)
POTASSIUM SERPL-SCNC: 6 MMOL/L (ref 3.7–5.3)
POTASSIUM SERPL-SCNC: 6.1 MMOL/L (ref 3.7–5.3)
PROT SERPL-MCNC: 7.3 G/DL (ref 6.4–8.3)
RBC # BLD AUTO: 2.84 M/UL (ref 4.5–5.9)
SODIUM SERPL-SCNC: 138 MMOL/L (ref 135–144)
WBC OTHER # BLD: 7.3 K/UL (ref 3.5–11)

## 2025-02-13 PROCEDURE — 6360000002 HC RX W HCPCS: Performed by: PHYSICIAN ASSISTANT

## 2025-02-13 PROCEDURE — 6370000000 HC RX 637 (ALT 250 FOR IP): Performed by: PHYSICIAN ASSISTANT

## 2025-02-13 PROCEDURE — 93005 ELECTROCARDIOGRAM TRACING: CPT | Performed by: NURSE PRACTITIONER

## 2025-02-13 PROCEDURE — 96365 THER/PROPH/DIAG IV INF INIT: CPT

## 2025-02-13 PROCEDURE — 2500000003 HC RX 250 WO HCPCS: Performed by: PHYSICIAN ASSISTANT

## 2025-02-13 PROCEDURE — 85025 COMPLETE CBC W/AUTO DIFF WBC: CPT

## 2025-02-13 PROCEDURE — 80053 COMPREHEN METABOLIC PANEL: CPT

## 2025-02-13 PROCEDURE — 84132 ASSAY OF SERUM POTASSIUM: CPT

## 2025-02-13 PROCEDURE — 82947 ASSAY GLUCOSE BLOOD QUANT: CPT

## 2025-02-13 PROCEDURE — 2580000003 HC RX 258: Performed by: PHYSICIAN ASSISTANT

## 2025-02-13 PROCEDURE — 99284 EMERGENCY DEPT VISIT MOD MDM: CPT

## 2025-02-13 PROCEDURE — 96375 TX/PRO/DX INJ NEW DRUG ADDON: CPT

## 2025-02-13 RX ORDER — GLUCAGON 1 MG/ML
1 KIT INJECTION PRN
Status: DISCONTINUED | OUTPATIENT
Start: 2025-02-13 | End: 2025-02-14 | Stop reason: HOSPADM

## 2025-02-13 RX ORDER — DEXTROSE MONOHYDRATE 100 MG/ML
INJECTION, SOLUTION INTRAVENOUS CONTINUOUS PRN
Status: DISCONTINUED | OUTPATIENT
Start: 2025-02-13 | End: 2025-02-14 | Stop reason: HOSPADM

## 2025-02-13 RX ORDER — CALCIUM GLUCONATE 20 MG/ML
1000 INJECTION, SOLUTION INTRAVENOUS ONCE
Status: COMPLETED | OUTPATIENT
Start: 2025-02-13 | End: 2025-02-13

## 2025-02-13 RX ADMIN — DEXTROSE MONOHYDRATE 250 ML: 100 INJECTION, SOLUTION INTRAVENOUS at 16:55

## 2025-02-13 RX ADMIN — CALCIUM GLUCONATE 1000 MG: 20 INJECTION, SOLUTION INTRAVENOUS at 17:13

## 2025-02-13 RX ADMIN — SODIUM ZIRCONIUM CYCLOSILICATE 10 G: 10 POWDER, FOR SUSPENSION ORAL at 16:55

## 2025-02-13 RX ADMIN — SODIUM BICARBONATE 50 MEQ: 84 INJECTION INTRAVENOUS at 16:50

## 2025-02-13 RX ADMIN — INSULIN HUMAN 10 UNITS: 100 INJECTION, SOLUTION PARENTERAL at 16:48

## 2025-02-13 ASSESSMENT — ENCOUNTER SYMPTOMS
COUGH: 0
WHEEZING: 0
SHORTNESS OF BREATH: 0

## 2025-02-13 NOTE — ED PROVIDER NOTES
EMERGENCY DEPARTMENT ENCOUNTER    Pt Name: Eber Bolden  MRN: [id number]  Birthdate 1950  Date of evaluation: 2/13/25  CHIEF COMPLAINT       Chief Complaint   Patient presents with    abnormal labs     Call from doctor last night , instructed to come to ER due to abnormal labs. Recently in hospital overnight last week. Potassium 7.5. bun 40, cr 2.1     HISTORY OF PRESENT ILLNESS   Patient is a 74-year-old male who presents with his wife for evaluation for elevated potassium creatinine and BUN.  The patient states that he had a follow-up yesterday for posthospital visit with his primary care office, they lanie some labs and then called him at 10:00 last night however he did not want to come in until today, he states that he actually is feeling much better than when he was admitted to the hospital.  He is taking the Keflex as prescribed.  Does have a history of CKD.  Has not had any chest pain or shortness of breath.             REVIEW OF SYSTEMS     Review of Systems   Respiratory:  Negative for cough, shortness of breath and wheezing.    Genitourinary:  Negative for difficulty urinating and dysuria.     PASTMEDICAL HISTORY     Past Medical History:   Diagnosis Date    BOOP (bronchiolitis obliterans with organizing pneumonia) (Formerly [facility]) 04/2014    SUSPECTED CAUSE GROUND MOLD OR POLLEN SPORES, O2  AS NEEDED AT HOME    Chronic obstructive pulmonary disease (Formerly [facility]) 02/12/2014    Deep vein thrombosis of lower extremity (Formerly [facility]) 07/21/2023    Difficult intravenous access     STATES HAND TH E BEST PLACE TO START    Hx of blood clots 2014    BILAT LEGS AND LUNG TREATED WITH COUMADIN    HX OTHER MEDICAL     BOOP Bronchial oblierate pneumonia    Hypertension     IN PAST RESOLVED NOW    Obesity     On supplemental oxygen therapy     O2 3L AS NEEDED AT HOME STATES NOT USING OFTEN    Pulmonary embolism (Formerly [facility]) 07/21/2023    Sleep apnea 2014    USES C-PAP NIGHTLY-INSTRUCTED TO BRING    Type II or unspecified type diabetes mellitus without  10 g PACK oral suspension     Sig: Take 1 packet by mouth daily for 5 days     Dispense:  5 packet     Refill:  0     DISCHARGE PRESCRIPTIONS:  Discharge Medication List as of 2/13/2025  7:59 PM        START taking these medications    Details   sodium zirconium cyclosilicate (LOKELMA) 10 g PACK oral suspension Take 1 packet by mouth daily for 5 days, Disp-5 packet, R-0Normal           PHYSICIAN CONSULTS ORDERED THIS ENCOUNTER:  IP CONSULT TO VASCULAR ACCESS TEAM  FINAL IMPRESSION      1. Hyperkalemia    2. Stage 3b chronic kidney disease (HCC)          DISPOSITION/PLAN   DISPOSITION Decision To Discharge 02/13/2025 07:53:34 PM   DISPOSITION CONDITION Stable           OUTPATIENT FOLLOW UP THE PATIENT:  Jose De Jesus Lancaster PA-C  [address] [facility] [address] [phone]    Schedule an appointment as soon as possible for a visit       [facility] Emergency Department  [address] [place] [address].  [facility] [id number] [phone]    As needed, If symptoms worsen    Alon Lozano MD  [address]  [facility] [address] [phone]            Chloe Morrissey PA-C        This note was created with the assistance of a speech-recognition program. While intending to generate a document that actually reflects the content of the visit, no guarantees can be provided that every mistake has been identified and corrected by editing.      Chloe Morrissey PA-C  02/14/25 0058

## 2025-02-13 NOTE — TELEPHONE ENCOUNTER
Center Call    Received at:   [facility] Message: critical lab result, needs callback    Subjective:  2243: Called and spoke with Marilee, advised that patient had critical potassium value.    : Called patient, call went to voicemail.     : Called emergency contact (Audra) and had her verify . Advised her that pt's potassium came back elevated and that he should seek immediate evaluation. Discussed that further review of his chart showed he was recently discharged from hospital for UTI and had JOESPH on CKD III with mild hyperkalemia on admission, so lower probability that hyperkalemia was from bad/hemolyzed sample. Audra said she felt pt was discharged 24-48hrs too early, but pt was eager to go home. Asked if pt needed to go for evaluation now- I advised that after hearing that she wanted him to stay in hospital a little while longer made me think it was even more important to go to emergency department.    Audra said pt was more active today- on previous days, had spent a lot of time on the couch resting, but was up and about today, ate dinner without issue, and went to bed at his normal time. She reports he didn't complain of any palpitations, nausea/vomiting, brain fog/confusion, fatigue, muscle cramps, or chest pain. Again advised that immediate evaluation was strongly recommended, and Audra said she would wake her , but was not sure if he would agree to go to ED tonight. Did explain that with hyperkalemia, arrhythmias were a concern. Audra voiced understanding, said she would wake pt. Advised that if pt experienced any of the previously reviewed sx, she should call for an ambulance.     Assesement/Plan:  Hyperkalemia (7.5mmol/L)  JOESPH on CKD III  Solitary kidney, acquired  Unable to reach pt, but spoke with emergency contact (Audra) about abnormal lab result  Pt asymptomatic   advised immediate evaluation, but that emergency services should be contacted if patient becomes

## 2025-02-13 NOTE — PROGRESS NOTES
Spiritual Health History and Assessment/Progress Note  [facility]    (P) Spiritual/Emotional Needs, Initial Encounter,  , (P) Life Adjustments, Adjustment to illness,      Name: Eber Bolden MRN: [id number]    Age: 74 y.o.     Sex: male   Language: English   Mormonism: Hindu   <principal problem not specified>     Date: 2/13/2025            Total Time Calculated: (P) 25 min              Spiritual Assessment began in [facility] EMERGENCY DEPARTMENT        Referral/Consult From: (P) Multi-disciplinary team   Encounter Overview/Reason: (P) Spiritual/Emotional Needs, Initial Encounter  Service Provided For: (P) Patient, Significant other        provided support and pastoral care and comfort. Pt. And spouse were friendly and welcoming. Pt. Was open to conversation as we talked about family and gemma. Pt. Expressed frustration with having to come back to hospital after being here earlier.  supported Pt. An provided empathic listening. Prayer was offered for comfort , strength and encouragement.    Good visit.    Gemma, Belief, Meaning:   Patient has beliefs or practices that help with coping during difficult times  Family/Friends have beliefs or practices that help with coping during difficult times      Importance and Influence:  Patient has spiritual/personal beliefs that influence decisions regarding their health  Family/Friends have spiritual/personal beliefs that influence decisions regarding the patient's health    Community:  Patient feels well-supported. Support system includes: Spouse/Partner  Family/Friends feel well-supported. Support system includes: Spouse/Partner    Assessment and Plan of Care:     Patient Interventions include: Facilitated expression of thoughts and feelings and Explored spiritual coping/struggle/distress  Family/Friends Interventions include: Facilitated expression of thoughts and feelings and Explored spiritual coping/struggle/distress    Patient

## 2025-02-13 NOTE — TELEPHONE ENCOUNTER
Spoke with pts wife, she is going to take him to the ED.  They will reach out and let us know what is going on.      TRC

## 2025-02-14 LAB
EKG ATRIAL RATE: 65 BPM
EKG P AXIS: 39 DEGREES
EKG P-R INTERVAL: 144 MS
EKG Q-T INTERVAL: 380 MS
EKG QRS DURATION: 84 MS
EKG QTC CALCULATION (BAZETT): 395 MS
EKG R AXIS: 0 DEGREES
EKG T AXIS: 64 DEGREES
EKG VENTRICULAR RATE: 65 BPM

## 2025-02-14 PROCEDURE — 93010 ELECTROCARDIOGRAM REPORT: CPT | Performed by: STUDENT IN AN ORGANIZED HEALTH CARE EDUCATION/TRAINING PROGRAM

## 2025-02-14 NOTE — ED PROVIDER NOTES
[facility] Emergency Department  [address]  [facility] [address]  Phone: [phone]  Fax: [phone]      Attending Physician Attestation    Based on the medical record, the care appears appropriate. I was personally available for consultation in the Emergency Department. I did have a discussion with our midlevel provider regarding the care of this patient.  I reviewed the mid level provider's note and agree with the documented findings and plan of care.   I have reviewed the emergency nurses triage note. I agree with the chief complaint, past medical history, past surgical history, allergies, medications, social and family history as documented unless otherwise noted below.       CHIEF COMPLAINT       Chief Complaint   Patient presents with    abnormal labs     Call from doctor last night , instructed to come to ER due to abnormal labs. Recently in hospital overnight last week. Potassium 7.5. bun 40, cr 2.1         PAST MEDICAL HISTORY    has a past medical history of BOOP (bronchiolitis obliterans with organizing pneumonia) (Formerly [facility]), Chronic obstructive pulmonary disease (HCC), Deep vein thrombosis of lower extremity (HCC), Difficult intravenous access, Hx of blood clots, HX OTHER MEDICAL, Hypertension, Obesity, On supplemental oxygen therapy, Pulmonary embolism (HCC), Sleep apnea, Type II or unspecified type diabetes mellitus without mention of complication, not stated as uncontrolled, and Wears glasses.    SURGICAL HISTORY      has a past surgical history that includes Kidney donation (1984); Lung biopsy (2014); eye surgery (Bilateral, 2015); and Fixation Kyphoplasty (4/6/2016).    CURRENT MEDICATIONS       Discharge Medication List as of 2/13/2025  7:59 PM        CONTINUE these medications which have NOT CHANGED    Details   budesonide-formoterol (SYMBICORT) 160-4.5 MCG/ACT AERO Inhale 2 puffs into the lungs 2 times daily, Disp-10.2 g, R-0Normal      cephALEXin (KEFLEX) 500 MG capsule Take  days, Disp-5 packet, R-0Normal             (Please note that portions of this note were completed with a voice recognition program.  Efforts were made to edit the dictations but occasionally words are mis-transcribed.)    Shree Carty DO, DO  Attending Emergency Physician       Shree Carty DO  02/13/25 7992

## 2025-02-14 NOTE — DISCHARGE INSTRUCTIONS
Please take the Lokelma daily as prescribed    As we discussed I would like you to discontinue your blood pressure medication    Please contact your primary care provider's office tomorrow morning to schedule a follow-up to see if they would like to put you on a different medications and also please call the nephrology office so that you can schedule follow-up    Return to the emergency department for any emergent concerns

## 2025-02-14 NOTE — ED PROVIDER NOTES
[facility] Emergency Department  [address]  [facility] [address]  Phone: [phone]  Fax: [phone]      Pt Name: Eber Bolden  MRN: [id number]  Birthdate 1950  Date of evaluation: 2/14/25    CHIEF COMPLAINT       Chief Complaint   Patient presents with    abnormal labs     Call from doctor last night , instructed to come to ER due to abnormal labs. Recently in hospital overnight last week. Potassium 7.5. bun 40, cr 2.1       PAST MEDICAL HISTORY    has a past medical history of BOOP (bronchiolitis obliterans with organizing pneumonia) ([facility]), Chronic obstructive pulmonary disease (HCC), Deep vein thrombosis of lower extremity (HCC), Difficult intravenous access, Hx of blood clots, HX OTHER MEDICAL, Hypertension, Obesity, On supplemental oxygen therapy, Pulmonary embolism (HCC), Sleep apnea, Type II or unspecified type diabetes mellitus without mention of complication, not stated as uncontrolled, and Wears glasses.    SURGICAL HISTORY      has a past surgical history that includes Kidney donation (1984); Lung biopsy (2014); eye surgery (Bilateral, 2015); and Fixation Kyphoplasty (4/6/2016).    CURRENT MEDICATIONS       Discharge Medication List as of 2/13/2025  7:59 PM        CONTINUE these medications which have NOT CHANGED    Details   budesonide-formoterol (SYMBICORT) 160-4.5 MCG/ACT AERO Inhale 2 puffs into the lungs 2 times daily, Disp-10.2 g, R-0Normal      cephALEXin (KEFLEX) 500 MG capsule Take 1 capsule by mouth 3 times daily for 14 days, Disp-42 capsule, R-0Normal      !! blood glucose test strips (ASCENSIA AUTODISC VI;ONE TOUCH ULTRA TEST VI) strip Historical Med      !! blood glucose test strips (EXACTECH TEST) strip Historical Med      Insulin Pen Needle 31G X 5 MM MISC Historical Med      Handicap Placard MISC Starting Fri 11/15/2024, Disp-1 each, R-0, Print      Continuous Glucose  (DEXCOM G7 ) KILO 1 each by Does not apply route dailyHistorical Med

## 2025-02-18 ENCOUNTER — HOSPITAL ENCOUNTER (OUTPATIENT)
Age: 75
Setting detail: THERAPIES SERIES
Discharge: HOME | End: 2025-02-18
Payer: MEDICARE

## 2025-02-18 PROCEDURE — 97162 PT EVAL MOD COMPLEX 30 MIN: CPT

## 2025-02-18 NOTE — CONSULTS
Therapeutic Exercise   36953  [] Iontophoresis: 4 mg/mL Dexamethasone Sodium Phosphate  mAmin  25979   [x] Therapeutic Activity  90019 [] Vasopneumatic cold with compression  48420    [x] Gait Training   81249 [] Ultrasound   72482   [x] Neuromuscular Re-education  41274 [] Electrical Stimulation Unattended  90384   [x] Manual Therapy  58677 [] Electrical Stimulation Attended  66843   [x] Instruction in HEP  [] Lumbar/Cervical Traction  64449   [] Aquatic Therapy   72411 [x] Cold/hotpack    [] Massage   16079      [] Dry Needling, 1 or 2 muscles  05014   [] Biofeedback, first 15 minutes   90912  [] Biofeedback, additional 15 minutes   90913 [] Dry Needling, 3 or more muscles  20561     []  Medication allergies reviewed for use of    Dexamethasone Sodium Phosphate 4mg/ml     with iontophoresis treatments.   Pt is not allergic.      Frequency:  2 x/week for 24 visits    Today’s Treatment: PT evaluation completed.  Pt performed exercises per list below and was given written copy of same exercises.  Modalities:   Precautions [] No  [x] Yes:  Fall Risk  Exercises:  Exercise Reps/ Time Weight/ Level Comments   Nu Step                  MAT TABLE       Bridge 5 x     LTR 5 x     Modified Suraj Stretch 30\" x 2     Supine Ham stretch 15\" x 3     Hip Add Isometric      BKFO      Piriformis Stretch??      Side Ly hip Abd 5 x                                   SEATED      Heel/Toe Raises      LAQ      HAM Curls      March                              STAND      Calf Stretch      Squats      Heel Toe Raises      3 Way Kicks      Step ups       Balance                                    Other:    Specific Instructions for next treatment:  Review response to previous visit and HEP; Add exercises per above list to improve strength, flexibility, balance, and safe mobility      Evaluation Complexity:  History (Personal factors, comorbidities) [] 0 [] 1-2 [x] 3+   Exam (limitations, restrictions) [] 1-2 [] 3 [x] 4+   Clinical

## 2025-02-20 ENCOUNTER — HOSPITAL ENCOUNTER (OUTPATIENT)
Age: 75
Setting detail: THERAPIES SERIES
Discharge: HOME | End: 2025-02-20
Payer: MEDICARE

## 2025-02-20 PROCEDURE — 97110 THERAPEUTIC EXERCISES: CPT

## 2025-02-20 NOTE — FLOWSHEET NOTE
10     Cancels/No Shows: 0/0    Subjective:    Pain:  [] Yes  [x] No Location:  Pain Rating: (0-10 scale) 0/10  Pain altered Tx:  [x] No  [] Yes  Action:  Comments:Pt denies new problems.  He denied trouble with HEP    Objective:  Walked into clinic without device but with antalgia  Modalities:   Precautions [] No  [x] Yes:  Fall Risk  Exercises:  Exercise Reps/ Time Weight/ Level Comments   Nu Step  5' L3  New 2/20                       MAT TABLE          Bridge 10 x      Increased reps  2/20   LTR 10  x      Increased reps  2/20   Modified Suraj Stretch 30\" x 2       Supine Ham stretch 15\" x 3    With towel assist  2/20   Hip Add Isometric  2\" x 10   New 2/20   BKFO         Piriformis Stretch??         Side Ly hip Abd 10 x    Increased reps  2/20                                                     SEATED         Heel/Toe Raises         LAQ         HAM Curls         March                                                 STAND         Calf Stretch  15\" x 3     New 2/20   Squats  10 x    New 2/20   Heel Toe Raises  10 x      New 2.20   3 Way Kicks         Step ups          Balance          Stand feet together  2 x      New 2/20    Stand on Foam  2 x      New 2/20    Stand eyes closed  2 x      New 2/20                                         Other:          Treatment Charges: Mins Units Time In/Out   []  Modalities        [x]  Ther Exercise   43   3 3:03/3:45   []  Neuromuscular Re-ed      []  Gait Training      []  Manual Therapy      []  Ther Activities      []  Aquatics      []  Vasocompression      []  Cervical Traction      []  Other      Total Billable time 43 min   3           Assessment: [] Progressing toward goals.      [x] No change.  First visit post evaluation.  Pt denied increased pain with PT today.  Pt did take 2 rest breaks but denied fatigue after PT.  Reviewed HEP with pt and encouraged pt to be more compliant with exercises.     [] Other:  [x] Patient would continue to benefit from skilled physical

## 2025-02-25 ENCOUNTER — HOSPITAL ENCOUNTER (OUTPATIENT)
Age: 75
Setting detail: THERAPIES SERIES
Discharge: HOME | End: 2025-02-25
Payer: MEDICARE

## 2025-02-25 PROCEDURE — 97110 THERAPEUTIC EXERCISES: CPT

## 2025-02-25 NOTE — FLOWSHEET NOTE
[] [facility]  Outpatient Rehabilitation &  Therapy  [address]  [phone]  [phone] [] [facility]  Outpatient Rehabilitation &  Therapy  [address]  P: [phone]  F: [phone] [] [facility]  Outpatient Rehabilitation &  Therapy  [address]  P: [phone]  F: [phone] [] [facility]  Outpatient Rehabilitation &  Therapy  [address]  [phone]  [phone] [] [facility]  Outpatient Rehabilitation &  Therapy  [address]   P: [phone]  F: [phone]  [] [facility]  Outpatient Rehabilitation &  Therapy  [address]  P: [phone]  F: [phone] [x] [facility]  Outpatient Rehabilitation &  Therapy  [address]  P: [phone]  F: [phone] [] [facility]  Outpatient Rehabilitation &  Therapy  [address]  P: [phone]  F: [phone] [] [facility]  Outpatient Rehabilitation &  Therapy  [address]  P: [phone]  F: [phone]  [] [facility] Outpatient Rehabilitation &  Therapy  [address]  P: [phone]  F: [phone]     Physical Therapy Daily Treatment Note    Date:  2025  Patient Name:  Eber Bolden    :  1950  MRN: [id number]  Physician: Jose De Jesus Lancaster PA-C                             Insurance: [facility] Medicare;  Follows Medicare Guidelines, No Hard Max;  AUTH Required after eval;  Approved 16 visits 25--4/15/25  Medical Diagnosis:   R53.81, R53.83 (ICD-10-CM) - Malaise and fatigue   R26.9 (ICD-10-CM) - Gait abnormality                             Rehab Codes: R26.2, M25.651, M25.652, M62.551, M62.552, R29.6  Onset Date: 25                                  Next 's appt: 25     Visit# / total visits: 3/24; Progress note for

## 2025-02-27 ENCOUNTER — HOSPITAL ENCOUNTER (OUTPATIENT)
Age: 75
Setting detail: THERAPIES SERIES
Discharge: HOME | End: 2025-02-27
Payer: MEDICARE

## 2025-02-27 PROCEDURE — 97110 THERAPEUTIC EXERCISES: CPT

## 2025-02-27 NOTE — FLOWSHEET NOTE
[] [facility]  Outpatient Rehabilitation &  Therapy  [address]  [phone]  [phone] [] [facility]  Outpatient Rehabilitation &  Therapy  [address]  P: [phone]  F: [phone] [] [facility]  Outpatient Rehabilitation &  Therapy  [address]  P: [phone]  F: [phone] [] [facility]  Outpatient Rehabilitation &  Therapy  [address]  [phone]  [phone] [] [facility]  Outpatient Rehabilitation &  Therapy  [address]   P: [phone]  F: [phone]  [] [facility]  Outpatient Rehabilitation &  Therapy  [address]  P: [phone]  F: [phone] [x] [facility]  Outpatient Rehabilitation &  Therapy  [address]  P: [phone]  F: [phone] [] [facility]  Outpatient Rehabilitation &  Therapy  [address]  P: [phone]  F: [phone] [] [facility]  Outpatient Rehabilitation &  Therapy  [address]  P: [phone]  F: [phone]  [] [facility] Outpatient Rehabilitation &  Therapy  [address]  P: [phone]  F: [phone]     Physical Therapy Daily Treatment Note    Date:  2025  Patient Name:  Eber Bolden    :  1950  MRN: [id number]  Physician: Jose De Jesus Lancaster PA-C                             Insurance: [facility] Medicare;  Follows Medicare Guidelines, No Hard Max;  AUTH Required after eval;  Approved 16 visits 25--4/15/25  Medical Diagnosis:   R53.81, R53.83 (ICD-10-CM) - Malaise and fatigue   R26.9 (ICD-10-CM) - Gait abnormality                             Rehab Codes: R26.2, M25.651, M25.652, M62.551, M62.552, R29.6  Onset Date: 25                                  Next 's appt: 25     Visit# / total visits: ; Progress note for

## 2025-03-05 ENCOUNTER — HOSPITAL ENCOUNTER (OUTPATIENT)
Age: 75
Setting detail: SPECIMEN
Discharge: HOME | End: 2025-03-05

## 2025-03-05 ENCOUNTER — OFFICE VISIT (OUTPATIENT)
Dept: PRIMARY CARE CLINIC | Age: 75
End: 2025-03-05
Payer: MEDICARE

## 2025-03-05 VITALS
SYSTOLIC BLOOD PRESSURE: 138 MMHG | WEIGHT: 236.6 LBS | HEIGHT: 69 IN | HEART RATE: 97 BPM | DIASTOLIC BLOOD PRESSURE: 64 MMHG | BODY MASS INDEX: 35.04 KG/M2 | OXYGEN SATURATION: 97 %

## 2025-03-05 DIAGNOSIS — E87.5 HYPERKALEMIA: ICD-10-CM

## 2025-03-05 DIAGNOSIS — R53.83 MALAISE AND FATIGUE: ICD-10-CM

## 2025-03-05 DIAGNOSIS — N18.31 STAGE 3A CHRONIC KIDNEY DISEASE (HCC): Primary | ICD-10-CM

## 2025-03-05 DIAGNOSIS — R45.4 IRRITABILITY: ICD-10-CM

## 2025-03-05 DIAGNOSIS — R53.81 MALAISE AND FATIGUE: ICD-10-CM

## 2025-03-05 DIAGNOSIS — N18.31 STAGE 3A CHRONIC KIDNEY DISEASE (HCC): ICD-10-CM

## 2025-03-05 LAB
ALBUMIN SERPL-MCNC: 4.1 G/DL (ref 3.5–5.2)
ALBUMIN/GLOB SERPL: 1.3 {RATIO} (ref 1–2.5)
ALP SERPL-CCNC: 86 U/L (ref 40–129)
ALT SERPL-CCNC: 36 U/L (ref 10–50)
ANION GAP SERPL CALCULATED.3IONS-SCNC: 12 MMOL/L (ref 9–16)
AST SERPL-CCNC: 31 U/L (ref 10–50)
BILIRUB SERPL-MCNC: 0.4 MG/DL (ref 0–1.2)
BUN SERPL-MCNC: 30 MG/DL (ref 8–23)
CALCIUM SERPL-MCNC: 9.1 MG/DL (ref 8.6–10.4)
CHLORIDE SERPL-SCNC: 105 MMOL/L (ref 98–107)
CO2 SERPL-SCNC: 23 MMOL/L (ref 20–31)
CREAT SERPL-MCNC: 1.7 MG/DL (ref 0.7–1.2)
GFR, ESTIMATED: 41 ML/MIN/1.73M2
GLUCOSE P FAST SERPL-MCNC: 139 MG/DL (ref 74–99)
POTASSIUM SERPL-SCNC: 5 MMOL/L (ref 3.7–5.3)
PROT SERPL-MCNC: 7.2 G/DL (ref 6.6–8.7)
SODIUM SERPL-SCNC: 140 MMOL/L (ref 136–145)

## 2025-03-05 PROCEDURE — 1123F ACP DISCUSS/DSCN MKR DOCD: CPT | Performed by: PHYSICIAN ASSISTANT

## 2025-03-05 PROCEDURE — 99214 OFFICE O/P EST MOD 30 MIN: CPT | Performed by: PHYSICIAN ASSISTANT

## 2025-03-05 PROCEDURE — 3075F SYST BP GE 130 - 139MM HG: CPT | Performed by: PHYSICIAN ASSISTANT

## 2025-03-05 PROCEDURE — 1160F RVW MEDS BY RX/DR IN RCRD: CPT | Performed by: PHYSICIAN ASSISTANT

## 2025-03-05 PROCEDURE — 3078F DIAST BP <80 MM HG: CPT | Performed by: PHYSICIAN ASSISTANT

## 2025-03-05 PROCEDURE — 1159F MED LIST DOCD IN RCRD: CPT | Performed by: PHYSICIAN ASSISTANT

## 2025-03-05 ASSESSMENT — ENCOUNTER SYMPTOMS
RHINORRHEA: 0
VOMITING: 0
SINUS PAIN: 0
NAUSEA: 0
DIARRHEA: 0
ABDOMINAL PAIN: 0
CONSTIPATION: 0
EYE PAIN: 0
BACK PAIN: 0
COUGH: 0
SHORTNESS OF BREATH: 0

## 2025-03-05 NOTE — PROGRESS NOTES
[facility]  [facility] PRIMARY CARE  [address] [address]  Dept: [phone]  Dept Fax: [phone]    Eber Bolden is a 74 y.o. male who presents today for his medical conditions/complaints as noted below.    Chief Complaint   Patient presents with    Follow-Up from Hospital     Patient in office for a hospital follow up, for Hypokalemia, Patient was discharged on2/13/25. And follow up for Lab results    Other     A1c checked yesterday and was 6.1 at [facility]       HPI:     Patient presents the office for follow-up and ED.  Patient was initially admitted to [facility] for UTI/dehydration/JOESPH.  He left after 1 day.  Repeat labs revealed hyperkalemia and it was recommended to go back to the ED.  Potassium came down and he was discharged home.  Patient did start physical therapy as recommended for overall gait and strengthening.  He feels that this was minimally beneficial.  He is going to try doing some of these exercises at home.  Overall since his last visit he reports he is getting out of bed more and doing more activities around the home.  Still has some decreased motivation and desire to do any activities outside the home.  Wife reports there is some irritability and anger, but patient denies any overt depression or anxiety.    He did see his endocrinologist yesterday and A1c was 6.1.    Blood pressure stable.  He is no longer taking irbesartan due to hyperkalemia.        Hemoglobin A1C (%)   Date Value   11/14/2024 7.3 (H)   01/17/2024 10.3 (H)   10/09/2023 8.0             ( goal A1C is < 7)   No components found for: \"LABMICR\"  No components found for: \"LDLCHOLESTEROL\", \"LDLCALC\"    (goal LDL is <100)   AST (U/L)   Date Value   02/13/2025 41 (H)     ALT (U/L)   Date Value   02/13/2025 96 (H)     BUN (mg/dL)   Date Value   02/13/2025 37 (H)     BP Readings from Last 3 Encounters:   03/05/25 138/64   02/13/25 (!) 117/98   02/12/25 122/62          (goal

## 2025-05-21 ENCOUNTER — HOSPITAL ENCOUNTER (OUTPATIENT)
Age: 75
Setting detail: SPECIMEN
Discharge: HOME | End: 2025-05-21

## 2025-05-21 ENCOUNTER — OFFICE VISIT (OUTPATIENT)
Dept: PRIMARY CARE CLINIC | Age: 75
End: 2025-05-21
Payer: MEDICARE

## 2025-05-21 VITALS
SYSTOLIC BLOOD PRESSURE: 128 MMHG | DIASTOLIC BLOOD PRESSURE: 64 MMHG | HEIGHT: 69 IN | HEART RATE: 103 BPM | WEIGHT: 240 LBS | BODY MASS INDEX: 35.55 KG/M2 | OXYGEN SATURATION: 96 %

## 2025-05-21 DIAGNOSIS — Z71.89 ACP (ADVANCE CARE PLANNING): ICD-10-CM

## 2025-05-21 DIAGNOSIS — Z00.00 MEDICARE ANNUAL WELLNESS VISIT, SUBSEQUENT: Primary | ICD-10-CM

## 2025-05-21 DIAGNOSIS — N18.32 TYPE 2 DIABETES MELLITUS WITH STAGE 3B CHRONIC KIDNEY DISEASE, WITH LONG-TERM CURRENT USE OF INSULIN (HCC): ICD-10-CM

## 2025-05-21 DIAGNOSIS — E53.8 VITAMIN B12 DEFICIENCY: ICD-10-CM

## 2025-05-21 DIAGNOSIS — E11.22 TYPE 2 DIABETES MELLITUS WITH STAGE 3B CHRONIC KIDNEY DISEASE, WITH LONG-TERM CURRENT USE OF INSULIN (HCC): ICD-10-CM

## 2025-05-21 DIAGNOSIS — Z79.4 TYPE 2 DIABETES MELLITUS WITH STAGE 3B CHRONIC KIDNEY DISEASE, WITH LONG-TERM CURRENT USE OF INSULIN (HCC): ICD-10-CM

## 2025-05-21 LAB
FOLATE SERPL-MCNC: 27.4 NG/ML (ref 4.8–24.2)
VIT B12 SERPL-MCNC: <150 PG/ML (ref 232–1245)

## 2025-05-21 PROCEDURE — 1160F RVW MEDS BY RX/DR IN RCRD: CPT | Performed by: PHYSICIAN ASSISTANT

## 2025-05-21 PROCEDURE — 1123F ACP DISCUSS/DSCN MKR DOCD: CPT | Performed by: PHYSICIAN ASSISTANT

## 2025-05-21 PROCEDURE — 1159F MED LIST DOCD IN RCRD: CPT | Performed by: PHYSICIAN ASSISTANT

## 2025-05-21 PROCEDURE — G0439 PPPS, SUBSEQ VISIT: HCPCS | Performed by: PHYSICIAN ASSISTANT

## 2025-05-21 PROCEDURE — 3074F SYST BP LT 130 MM HG: CPT | Performed by: PHYSICIAN ASSISTANT

## 2025-05-21 PROCEDURE — 3078F DIAST BP <80 MM HG: CPT | Performed by: PHYSICIAN ASSISTANT

## 2025-05-21 ASSESSMENT — PATIENT HEALTH QUESTIONNAIRE - PHQ9
SUM OF ALL RESPONSES TO PHQ QUESTIONS 1-9: 0
SUM OF ALL RESPONSES TO PHQ QUESTIONS 1-9: 0
2. FEELING DOWN, DEPRESSED OR HOPELESS: NOT AT ALL
SUM OF ALL RESPONSES TO PHQ QUESTIONS 1-9: 0
SUM OF ALL RESPONSES TO PHQ QUESTIONS 1-9: 0
1. LITTLE INTEREST OR PLEASURE IN DOING THINGS: NOT AT ALL

## 2025-05-21 NOTE — PROGRESS NOTES
well- nourished, in no acute distress  Skin: warm and dry, no rash or erythema  Head: normocephalic and atraumatic  Eyes: pupils equal, round, and reactive to light, extraocular eye movements intact, conjunctivae normal  ENT: tympanic membrane, external ear and ear canal normal bilaterally, nose without deformity, nasal mucosa and turbinates normal without polyps  Neck: supple and non-tender without mass, no thyromegaly or thyroid nodules, no cervical lymphadenopathy  Pulmonary/Chest: clear to auscultation bilaterally- no wheezes, rales or rhonchi, normal air movement, no respiratory distress  Cardiovascular: normal rate, regular rhythm, normal S1 and S2  Extremities: no cyanosis, clubbing or edema  Musculoskeletal: normal range of motion, no joint swelling, deformity or tenderness  Neurologic:  no cranial nerve deficit, gait, coordination and speech normal          No Known Allergies  Prior to Visit Medications    Medication Sig Taking? Authorizing Provider   blood glucose test strips (ASCENSIA AUTODISC VI;ONE TOUCH ULTRA TEST VI) strip  Yes Abdelrahman Mcghee MD   blood glucose test strips (EXACTECH TEST) strip  Yes Abdelrahman Mcghee MD   Insulin Pen Needle 31G X 5 MM MISC  Yes Abdelrahman Mcghee MD   Handicap Placard MISC by Does not apply route Yes Becca Bradford MD   Continuous Glucose  (DEXCOM G7 ) KILO 1 each by Does not apply route daily Yes Abdelrahman Mcghee MD   Continuous Glucose Sensor (DEXCOM G7 SENSOR) MISC 1 each by Does not apply route daily Yes Abdelrahman Mcghee MD   LANTUS SOLOSTAR 100 UNIT/ML injection pen  Yes Abdelrahman Mcghee MD   atorvastatin (LIPITOR) 10 MG tablet Take 1 tablet by mouth Yes Abdelrahman Mcghee MD   insulin lispro, 1 Unit Dial, (HUMALOG/ADMELOG) 100 UNIT/ML SOPN 20+ sliding scale Yes Abdelrahman Mcghee MD   glimepiride (AMARYL) 4 MG tablet Take 1 tablet by mouth every morning (before breakfast) Yes Abdelrahman Mcghee MD

## 2025-05-21 NOTE — PATIENT INSTRUCTIONS
professional. [employer], [facility], disclaims any warranty or liability for your use of this information.    Personalized Preventive Plan for Eber Bolden - 5/21/2025  Medicare offers a range of preventive health benefits. Some of the tests and screenings are paid in full while other may be subject to a deductible, co-insurance, and/or copay.  Some of these benefits include a comprehensive review of your medical history including lifestyle, illnesses that may run in your family, and various assessments and screenings as appropriate.  After reviewing your medical record and screening and assessments performed today your provider may have ordered immunizations, labs, imaging, and/or referrals for you.  A list of these orders (if applicable) as well as your Preventive Care list are included within your After Visit Summary for your review.

## 2025-05-22 ENCOUNTER — RESULTS FOLLOW-UP (OUTPATIENT)
Dept: PRIMARY CARE CLINIC | Age: 75
End: 2025-05-22

## 2025-05-22 ENCOUNTER — CLINICAL DOCUMENTATION (OUTPATIENT)
Dept: SPIRITUAL SERVICES | Age: 75
End: 2025-05-22

## 2025-05-22 NOTE — ACP (ADVANCE CARE PLANNING)
Advance Care Planning   Ambulatory ACP Specialist Patient Outreach    Date:  5/22/2025    ACP Specialist:  Gabriella Newsome MA    Outreach call to patient in follow-up to ACP Specialist referral from:Jose De Jesus Lancaster PA-C    [x] PCP  [] Provider   [] Ambulatory Care Management [] Other     For:                  [] Advance Directive Assistance              [] Complete Portable DNR order              [] Complete POST/POLST/MOST              [] Code Status Discussion             [x] Discuss Goals of Care             [x] Early ACP Decision-Making              [] Other (Specify)    Date Referral Received:05/21/2025    Next Step:   [x] ACP scheduled conversation  [] Outreach again in one week               [x] Email / Mail ACP Info Sheets  [x] Email / Mail Advance Directive   [] Closing referral.  Routing closure to referring provider/staff and to ACP Specialist .    [] Closure letter mailed to patient with invitation to contact ACP Specialist if / when ready.   [] Other (Specify here):       [x] At this time, Healthcare Decision Maker Is:      Primary Decision Maker: Audra Bolden - Spouse - [phone]       [] Primary agent named in scanned advance directive.    [x] Legal Next of Kin.     [] Unable to determine legal decision maker at this time.    Outreaches:       [x] 1st -  Date:  05/22/2025               Intervention:  [x] Spoke with Patient   [] Left Voice mail [x] Email / Mail    [] Shayne Foodshart  [] Other (Specify) :     Outcomes:Writer contacted patient on home/mobile phone ([phone]) to discuss ACP.  Patient is agreeable to a conversation with ACP Specialist Josie Carpio on 06/06/2025 at 3:00 PM. He is asking his spouse to be included.  A copy of [place] [employer] forms and ACP information sheets sent to patient's confirmed email address on file with ACP Specialist and Coordinator's contact information included.          Thank you for this referral.

## 2025-06-05 ENCOUNTER — PATIENT MESSAGE (OUTPATIENT)
Age: 75
End: 2025-06-05

## 2025-06-06 ENCOUNTER — CLINICAL DOCUMENTATION (OUTPATIENT)
Age: 75
End: 2025-06-06

## 2025-06-06 NOTE — ACP (ADVANCE CARE PLANNING)
Advance Care Planning   Ambulatory ACP Specialist Patient Outreach    Date:  6/6/2025    ACP Specialist:  RADHA Moore    Outreach call to patient in follow-up to ACP Specialist referral from:Jose De Jesus Lancaster PA-C    [x] PCP  [] Provider   [] Ambulatory Care Management [] Other     For:                  [] Advance Directive Assistance              [] Complete Portable DNR order              [] Complete POST/POLST/MOST              [] Code Status Discussion             [x] Discuss Goals of Care             [x] Early ACP Decision-Making              [] Other (Specify)    Date Referral Received:05/21/2025    Next Step:   [x] ACP scheduled conversation  [] Outreach again in one week               [x] Email / Mail ACP Info Sheets  [x] Email / Mail Advance Directive   [] Closing referral.  Routing closure to referring provider/staff and to ACP Specialist .    [] Closure letter mailed to patient with invitation to contact ACP Specialist if / when ready.   [] Other (Specify here):       [x] At this time, Healthcare Decision Maker Is:         [] Primary agent named in scanned advance directive.    [x] Legal Next of Kin.     [] Unable to determine legal decision maker at this time.      Primary Decision Maker: Audra Bolden - Spouse - [phone]     Outreaches:          [x]  Additional Outreach -  Date:  06/06/2025   (Specify Dates & special circumstances):    Outcomes: Spoke with pt and we rescheduled ACP visit that was set for today to allow pt more time to review ACP. Did resend pt ACP info to confirmed email address. Rescheduled for Wed Jun 18 at 3pm. Pt's spouse may join as well. Will continue to follow.         Thank you for this referral.

## 2025-06-18 ENCOUNTER — CLINICAL DOCUMENTATION (OUTPATIENT)
Age: 75
End: 2025-06-18

## 2025-06-18 NOTE — ACP (ADVANCE CARE PLANNING)
Documentation:  I communicated with the patient and/or health care decision maker about ACP.     Eber Bolden was seen through a synchronous (real-time) audio encounter. Patient identification was verified at the start of the visit. This visit was conducted with the patient's (and/or legal guardian's) verbal consent.  The patient was located at Home: [address] [place] [id number].  The provider was located at Home (Appt Dept State): [place].  Confirm you are appropriately licensed, registered, or certified to deliver care in the state where the patient is located as indicated above. If you are not or unsure, please re-schedule the visit: Yes, I confirm.     Total time spent for this encounter: 35      Eber Bolden is a 74 y.o. male evaluated via telephone on 6/18/2025.    RADHA Moore    An electronic signature was used to authenticate this note.       Advance Care Planning    Advance Care Planning Clinical Specialist  Conversation Note    Date of ACP Conversation: 6/18/2025    ACP Clinical Specialist: RADHA Moore    Referral Date:05/21/2025    Received by: PCP    Conversation Conducted with: Patient with decision making capacity    Current Designated Decision Maker/s:    Primary Decision Maker: Audra Bolden - Spouse - [phone]    Secondary Decision Maker: Freya Swenson - Child - [phone]      Care Preferences Communicated    Code Status:  If the patient's heart were to stop beating, in their present state of health, the patient's CPR Preference: Yes, attempt CPR and pt is FULL CODE but pt shared \"don't keep me on life support and dont keep doing CPR if I don't have a chance.\"    If their health worsens and it becomes clear that the chance of recovery is unlikely, the patient's CPR Preference: No, allow a natural death (No CPR)     Ventilator:  If the patient, in their present state of health, suddenly became very ill and unable to breathe on their own, the patient desires the use of a ventilator

## 2025-06-24 ENCOUNTER — OFFICE VISIT (OUTPATIENT)
Dept: PRIMARY CARE CLINIC | Age: 75
End: 2025-06-24
Payer: MEDICARE

## 2025-06-24 ENCOUNTER — HOSPITAL ENCOUNTER (OUTPATIENT)
Age: 75
Setting detail: SPECIMEN
Discharge: HOME | End: 2025-06-24

## 2025-06-24 VITALS
SYSTOLIC BLOOD PRESSURE: 160 MMHG | WEIGHT: 239 LBS | DIASTOLIC BLOOD PRESSURE: 78 MMHG | HEART RATE: 73 BPM | OXYGEN SATURATION: 96 % | BODY MASS INDEX: 35.29 KG/M2

## 2025-06-24 DIAGNOSIS — N30.00 ACUTE CYSTITIS WITHOUT HEMATURIA: ICD-10-CM

## 2025-06-24 DIAGNOSIS — R39.9 UTI SYMPTOMS: Primary | ICD-10-CM

## 2025-06-24 LAB
BILIRUBIN, POC: NEGATIVE
BLOOD URINE, POC: NEGATIVE
CLARITY, POC: NORMAL
COLOR, POC: NORMAL
GLUCOSE URINE, POC: NORMAL MG/DL
KETONES, POC: NEGATIVE MG/DL
LEUKOCYTE EST, POC: NEGATIVE
NITRITE, POC: POSITIVE
PH, POC: 6
PROTEIN, POC: NEGATIVE MG/DL
SPECIFIC GRAVITY, POC: 1.02
UROBILINOGEN, POC: NORMAL MG/DL

## 2025-06-24 PROCEDURE — 3078F DIAST BP <80 MM HG: CPT

## 2025-06-24 PROCEDURE — 1123F ACP DISCUSS/DSCN MKR DOCD: CPT

## 2025-06-24 PROCEDURE — 81003 URINALYSIS AUTO W/O SCOPE: CPT

## 2025-06-24 PROCEDURE — 3077F SYST BP >= 140 MM HG: CPT

## 2025-06-24 PROCEDURE — 99214 OFFICE O/P EST MOD 30 MIN: CPT

## 2025-06-24 RX ORDER — SULFAMETHOXAZOLE AND TRIMETHOPRIM 800; 160 MG/1; MG/1
1 TABLET ORAL 2 TIMES DAILY
Qty: 14 TABLET | Refills: 0 | Status: SHIPPED | OUTPATIENT
Start: 2025-06-24 | End: 2025-07-01

## 2025-06-24 NOTE — PROGRESS NOTES
[facility] WALK-IN  [address] [address]    [facility] WALK-IN  [address]  [address]  [facility] [address]  Dept: [phone]    Eber Bolden is a 74 y.o. male Established patient, who presents to the walk-in clinic today with conditions/complaints as noted below:    Chief Complaint   Patient presents with    Other     Possible UTI         HPI:     Confusion last 2-3 weeks. Wife notes he has been very irritable and not like himself.  Denies any UTI symptoms.  [place] and did  not know how to get home.  He had to call his wife to come pick him up.  He has been afebrile.      Has notbeen taking his insulin routinely as he has been to irritable, his wife states she can't help him do the dosing and he has not been agreeable to help.  His blood sugars have been elevated without his routine insulin.            Past Medical History:   Diagnosis Date    BOOP (bronchiolitis obliterans with organizing pneumonia) ([facility]) 04/2014    SUSPECTED CAUSE GROUND MOLD OR POLLEN SPORES, O2  AS NEEDED AT HOME    Chronic obstructive pulmonary disease ([facility]) 02/12/2014    Deep vein thrombosis of lower extremity ([facility]) 07/21/2023    Difficult intravenous access     STATES HAND TH E BEST PLACE TO START    Hx of blood clots 2014    BILAT LEGS AND LUNG TREATED WITH COUMADIN    HX OTHER MEDICAL     BOOP Bronchial oblierate pneumonia    Hypertension     IN PAST RESOLVED NOW    Obesity     On supplemental oxygen therapy     O2 3L AS NEEDED AT HOME STATES NOT USING OFTEN    Pulmonary embolism ([facility]) 07/21/2023    Sleep apnea 2014    USES C-PAP NIGHTLY-INSTRUCTED TO BRING    Type II or unspecified type diabetes mellitus without mention of complication, not stated as uncontrolled 2011    Wears glasses        Current Outpatient Medications   Medication Sig

## 2025-06-26 ENCOUNTER — RESULTS FOLLOW-UP (OUTPATIENT)
Dept: PRIMARY CARE CLINIC | Age: 75
End: 2025-06-26

## 2025-06-27 LAB
MICROORGANISM SPEC CULT: ABNORMAL
SERVICE CMNT-IMP: ABNORMAL
SPECIMEN DESCRIPTION: ABNORMAL

## 2025-07-02 ENCOUNTER — HOSPITAL ENCOUNTER (OUTPATIENT)
Age: 75
Setting detail: SPECIMEN
Discharge: HOME | End: 2025-07-02

## 2025-07-02 ENCOUNTER — OFFICE VISIT (OUTPATIENT)
Dept: PRIMARY CARE CLINIC | Age: 75
End: 2025-07-02
Payer: MEDICARE

## 2025-07-02 VITALS
HEART RATE: 90 BPM | HEIGHT: 69 IN | SYSTOLIC BLOOD PRESSURE: 132 MMHG | DIASTOLIC BLOOD PRESSURE: 60 MMHG | WEIGHT: 236 LBS | BODY MASS INDEX: 34.96 KG/M2 | RESPIRATION RATE: 18 BRPM | OXYGEN SATURATION: 97 %

## 2025-07-02 DIAGNOSIS — R26.9 GAIT ABNORMALITY: ICD-10-CM

## 2025-07-02 DIAGNOSIS — N18.32 TYPE 2 DIABETES MELLITUS WITH STAGE 3B CHRONIC KIDNEY DISEASE, WITH LONG-TERM CURRENT USE OF INSULIN (HCC): ICD-10-CM

## 2025-07-02 DIAGNOSIS — E53.8 VITAMIN B12 DEFICIENCY: ICD-10-CM

## 2025-07-02 DIAGNOSIS — E11.22 TYPE 2 DIABETES MELLITUS WITH STAGE 3B CHRONIC KIDNEY DISEASE, WITH LONG-TERM CURRENT USE OF INSULIN (HCC): ICD-10-CM

## 2025-07-02 DIAGNOSIS — Z79.4 TYPE 2 DIABETES MELLITUS WITH STAGE 3B CHRONIC KIDNEY DISEASE, WITH LONG-TERM CURRENT USE OF INSULIN (HCC): ICD-10-CM

## 2025-07-02 DIAGNOSIS — R41.89 COGNITIVE CHANGES: Primary | ICD-10-CM

## 2025-07-02 DIAGNOSIS — N30.00 ACUTE CYSTITIS WITHOUT HEMATURIA: ICD-10-CM

## 2025-07-02 DIAGNOSIS — Z91.81 AT HIGH RISK FOR FALLS: ICD-10-CM

## 2025-07-02 DIAGNOSIS — R41.89 COGNITIVE CHANGES: ICD-10-CM

## 2025-07-02 LAB
ALBUMIN SERPL-MCNC: 4.6 G/DL (ref 3.5–5.2)
ALBUMIN/GLOB SERPL: 1.4 {RATIO} (ref 1–2.5)
ALP SERPL-CCNC: 94 U/L (ref 40–129)
ALT SERPL-CCNC: 37 U/L (ref 10–50)
ANION GAP SERPL CALCULATED.3IONS-SCNC: 12 MMOL/L (ref 9–16)
AST SERPL-CCNC: 40 U/L (ref 10–50)
BACTERIA URNS QL MICRO: NORMAL
BASOPHILS # BLD: 0.04 K/UL (ref 0–0.2)
BASOPHILS NFR BLD: 1 % (ref 0–2)
BILIRUB SERPL-MCNC: 0.3 MG/DL (ref 0–1.2)
BILIRUB UR QL STRIP: NEGATIVE
BUN SERPL-MCNC: 36 MG/DL (ref 8–23)
CALCIUM SERPL-MCNC: 9.7 MG/DL (ref 8.6–10.4)
CASTS #/AREA URNS LPF: NORMAL /LPF (ref 0–8)
CHLORIDE SERPL-SCNC: 104 MMOL/L (ref 98–107)
CLARITY UR: CLEAR
CO2 SERPL-SCNC: 20 MMOL/L (ref 20–31)
COLOR UR: YELLOW
CREAT SERPL-MCNC: 2.8 MG/DL (ref 0.7–1.2)
EOSINOPHIL # BLD: 0.21 K/UL (ref 0–0.44)
EOSINOPHILS RELATIVE PERCENT: 3 % (ref 1–4)
EPI CELLS #/AREA URNS HPF: NORMAL /HPF (ref 0–5)
ERYTHROCYTE [DISTWIDTH] IN BLOOD BY AUTOMATED COUNT: 15.1 % (ref 11.8–14.4)
EST. AVERAGE GLUCOSE BLD GHB EST-MCNC: 157 MG/DL
GFR, ESTIMATED: 23 ML/MIN/1.73M2
GLUCOSE P FAST SERPL-MCNC: 177 MG/DL (ref 74–99)
GLUCOSE UR STRIP-MCNC: NEGATIVE MG/DL
HBA1C MFR BLD: 7.1 % (ref 4–6)
HCT VFR BLD AUTO: 36.4 % (ref 40.7–50.3)
HGB BLD-MCNC: 12.5 G/DL (ref 13–17)
HGB UR QL STRIP.AUTO: NEGATIVE
IMM GRANULOCYTES # BLD AUTO: 0.06 K/UL (ref 0–0.3)
IMM GRANULOCYTES NFR BLD: 1 %
KETONES UR STRIP-MCNC: ABNORMAL MG/DL
LEUKOCYTE ESTERASE UR QL STRIP: NEGATIVE
LYMPHOCYTES NFR BLD: 1.48 K/UL (ref 1.1–3.7)
LYMPHOCYTES RELATIVE PERCENT: 21 % (ref 24–43)
MAGNESIUM SERPL-MCNC: 2.3 MG/DL (ref 1.6–2.4)
MCH RBC QN AUTO: 35.1 PG (ref 25.2–33.5)
MCHC RBC AUTO-ENTMCNC: 34.3 G/DL (ref 28.4–34.8)
MCV RBC AUTO: 102.2 FL (ref 82.6–102.9)
MONOCYTES NFR BLD: 0.49 K/UL (ref 0.1–1.2)
MONOCYTES NFR BLD: 7 % (ref 3–12)
NEUTROPHILS NFR BLD: 67 % (ref 36–65)
NEUTS SEG NFR BLD: 4.63 K/UL (ref 1.5–8.1)
NITRITE UR QL STRIP: NEGATIVE
NRBC BLD-RTO: 0 PER 100 WBC
PH UR STRIP: 5.5 [PH] (ref 5–8)
PLATELET # BLD AUTO: 183 K/UL (ref 138–453)
PMV BLD AUTO: 10.1 FL (ref 8.1–13.5)
POTASSIUM SERPL-SCNC: 6.4 MMOL/L (ref 3.7–5.3)
PROT SERPL-MCNC: 8 G/DL (ref 6.6–8.7)
PROT UR STRIP-MCNC: ABNORMAL MG/DL
RBC # BLD AUTO: 3.56 M/UL (ref 4.21–5.77)
RBC # BLD: ABNORMAL 10*6/UL
RBC #/AREA URNS HPF: NORMAL /HPF (ref 0–4)
SODIUM SERPL-SCNC: 136 MMOL/L (ref 136–145)
SP GR UR STRIP: 1.02 (ref 1–1.03)
TSH SERPL DL<=0.05 MIU/L-ACNC: 1.18 UIU/ML (ref 0.27–4.2)
UROBILINOGEN UR STRIP-ACNC: NORMAL EU/DL (ref 0–1)
WBC #/AREA URNS HPF: NORMAL /HPF (ref 0–5)
WBC OTHER # BLD: 6.9 K/UL (ref 3.5–11.3)

## 2025-07-02 PROCEDURE — 3075F SYST BP GE 130 - 139MM HG: CPT | Performed by: PHYSICIAN ASSISTANT

## 2025-07-02 PROCEDURE — 1125F AMNT PAIN NOTED PAIN PRSNT: CPT | Performed by: PHYSICIAN ASSISTANT

## 2025-07-02 PROCEDURE — 99214 OFFICE O/P EST MOD 30 MIN: CPT | Performed by: PHYSICIAN ASSISTANT

## 2025-07-02 PROCEDURE — 1123F ACP DISCUSS/DSCN MKR DOCD: CPT | Performed by: PHYSICIAN ASSISTANT

## 2025-07-02 PROCEDURE — 3078F DIAST BP <80 MM HG: CPT | Performed by: PHYSICIAN ASSISTANT

## 2025-07-02 PROCEDURE — 96372 THER/PROPH/DIAG INJ SC/IM: CPT | Performed by: PHYSICIAN ASSISTANT

## 2025-07-02 PROCEDURE — 3051F HG A1C>EQUAL 7.0%<8.0%: CPT | Performed by: PHYSICIAN ASSISTANT

## 2025-07-02 RX ORDER — ATORVASTATIN CALCIUM 10 MG/1
10 TABLET, FILM COATED ORAL DAILY
Qty: 90 TABLET | Refills: 1 | Status: ON HOLD | OUTPATIENT
Start: 2025-07-02 | End: 2025-07-08

## 2025-07-02 RX ORDER — CYANOCOBALAMIN 1000 UG/ML
1000 INJECTION, SOLUTION INTRAMUSCULAR; SUBCUTANEOUS ONCE
Status: COMPLETED | OUTPATIENT
Start: 2025-07-02 | End: 2025-07-02

## 2025-07-02 RX ADMIN — CYANOCOBALAMIN 1000 MCG: 1000 INJECTION, SOLUTION INTRAMUSCULAR; SUBCUTANEOUS at 12:37

## 2025-07-02 ASSESSMENT — PATIENT HEALTH QUESTIONNAIRE - PHQ9
SUM OF ALL RESPONSES TO PHQ QUESTIONS 1-9: 1
2. FEELING DOWN, DEPRESSED OR HOPELESS: NOT AT ALL
1. LITTLE INTEREST OR PLEASURE IN DOING THINGS: SEVERAL DAYS

## 2025-07-02 NOTE — PROGRESS NOTES
use: No     Comment: QUIT 1981      Current Outpatient Medications   Medication Sig Dispense Refill    blood glucose test strips (ASCENSIA AUTODISC VI;ONE TOUCH ULTRA TEST VI) strip       blood glucose test strips (EXACTECH TEST) strip       Insulin Pen Needle 31G X 5 MM MISC       Handicap Placard MISC by Does not apply route 1 each 0    Continuous Glucose  (DEXCOM G7 ) KILO 1 each by Does not apply route daily      Continuous Glucose Sensor (DEXCOM G7 SENSOR) MISC 1 each by Does not apply route daily      empagliflozin (JARDIANCE) 10 MG tablet Take 1 tablet by mouth daily 90 tablet 1    sodium bicarbonate 650 MG tablet Take 1 tablet by mouth 3 times daily for 15 days 45 tablet 0    busPIRone (BUSPAR) 5 MG tablet Take 1 tablet by mouth 2 times daily for 15 days 30 tablet 0    sodium zirconium cyclosilicate (LOKELMA) 5 g PACK oral suspension Take 1 packet by mouth daily 30 each 0    aspirin 81 MG chewable tablet Take 1 tablet by mouth daily 30 tablet 3    insulin lispro, 1 Unit Dial, (HUMALOG KWIKPEN) 100 UNIT/ML SOPN For sugar less than 150 = 0 units 150-200 = 2 units 201-250 = 4 units 251-300 = 6 units 301-350 = 8 units. Maximum 24 units in a day 5 Adjustable Dose Pre-filled Pen Syringe 2    hydrALAZINE (APRESOLINE) 25 MG tablet Take 1 tablet by mouth 4 times daily 120 tablet 3    carvedilol (COREG) 12.5 MG tablet Take 1 tablet by mouth 2 times daily (with meals) 60 tablet 3    amLODIPine (NORVASC) 10 MG tablet Take 1 tablet by mouth daily 30 tablet 3    clopidogrel (PLAVIX) 75 MG tablet Take 1 tablet by mouth daily 30 tablet 3    insulin glargine (LANTUS SOLOSTAR) 100 UNIT/ML injection pen Inject 30 Units into the skin nightly 5 Adjustable Dose Pre-filled Pen Syringe 3    Insulin Pen Needle 32G X 4 MM MISC 1 each by Does not apply route daily 100 each 3    atorvastatin (LIPITOR) 20 MG tablet Take 1 tablet by mouth daily 30 tablet 0     No current facility-administered medications for this visit.

## 2025-07-03 ENCOUNTER — RESULTS FOLLOW-UP (OUTPATIENT)
Dept: PRIMARY CARE CLINIC | Age: 75
End: 2025-07-03

## 2025-07-03 ENCOUNTER — HOSPITAL ENCOUNTER (EMERGENCY)
Age: 75
Discharge: ANOTHER HOSPITAL | DRG: 981 | End: 2025-07-03
Attending: EMERGENCY MEDICINE
Payer: MEDICARE

## 2025-07-03 ENCOUNTER — HOSPITAL ENCOUNTER (INPATIENT)
Age: 75
LOS: 5 days | Discharge: HOME | DRG: 981 | End: 2025-07-08
Attending: HOSPITALIST | Admitting: STUDENT IN AN ORGANIZED HEALTH CARE EDUCATION/TRAINING PROGRAM
Payer: MEDICARE

## 2025-07-03 VITALS
RESPIRATION RATE: 16 BRPM | BODY MASS INDEX: 34.96 KG/M2 | WEIGHT: 236 LBS | TEMPERATURE: 97.7 F | HEIGHT: 69 IN | SYSTOLIC BLOOD PRESSURE: 163 MMHG | DIASTOLIC BLOOD PRESSURE: 89 MMHG | OXYGEN SATURATION: 97 % | HEART RATE: 85 BPM

## 2025-07-03 DIAGNOSIS — Z79.4 TYPE 2 DIABETES MELLITUS WITH STAGE 3B CHRONIC KIDNEY DISEASE, WITH LONG-TERM CURRENT USE OF INSULIN (HCC): ICD-10-CM

## 2025-07-03 DIAGNOSIS — E87.5 HYPERKALEMIA: Primary | ICD-10-CM

## 2025-07-03 DIAGNOSIS — E11.22 TYPE 2 DIABETES MELLITUS WITH STAGE 3B CHRONIC KIDNEY DISEASE, WITH LONG-TERM CURRENT USE OF INSULIN (HCC): ICD-10-CM

## 2025-07-03 DIAGNOSIS — N17.9 AKI (ACUTE KIDNEY INJURY): Primary | ICD-10-CM

## 2025-07-03 DIAGNOSIS — F41.1 GENERALIZED ANXIETY DISORDER: ICD-10-CM

## 2025-07-03 DIAGNOSIS — N18.32 TYPE 2 DIABETES MELLITUS WITH STAGE 3B CHRONIC KIDNEY DISEASE, WITH LONG-TERM CURRENT USE OF INSULIN (HCC): ICD-10-CM

## 2025-07-03 DIAGNOSIS — N17.9 AKI (ACUTE KIDNEY INJURY): ICD-10-CM

## 2025-07-03 DIAGNOSIS — G47.33 OBSTRUCTIVE SLEEP APNEA SYNDROME: ICD-10-CM

## 2025-07-03 DIAGNOSIS — E87.5 HYPERKALEMIA: ICD-10-CM

## 2025-07-03 DIAGNOSIS — I63.9 CEREBROVASCULAR ACCIDENT (CVA), UNSPECIFIED MECHANISM (HCC): ICD-10-CM

## 2025-07-03 PROBLEM — E78.49 OTHER HYPERLIPIDEMIA: Status: ACTIVE | Noted: 2023-07-21

## 2025-07-03 LAB
ANION GAP SERPL CALCULATED.3IONS-SCNC: 11 MMOL/L (ref 9–16)
ANION GAP SERPL CALCULATED.3IONS-SCNC: 13 MMOL/L (ref 9–16)
BACTERIA URNS QL MICRO: ABNORMAL
BASOPHILS # BLD: 0 K/UL (ref 0–0.2)
BASOPHILS NFR BLD: 1 % (ref 0–2)
BILIRUB UR QL STRIP: NEGATIVE
BUN SERPL-MCNC: 47 MG/DL (ref 8–23)
BUN SERPL-MCNC: 47 MG/DL (ref 8–23)
CALCIUM SERPL-MCNC: 9 MG/DL (ref 8.6–10.4)
CALCIUM SERPL-MCNC: 9.4 MG/DL (ref 8.6–10.4)
CASTS #/AREA URNS LPF: ABNORMAL /LPF
CASTS #/AREA URNS LPF: ABNORMAL /LPF
CHARACTER UR: ABNORMAL
CHLORIDE SERPL-SCNC: 102 MMOL/L (ref 98–107)
CHLORIDE SERPL-SCNC: 104 MMOL/L (ref 98–107)
CLARITY UR: ABNORMAL
CO2 SERPL-SCNC: 19 MMOL/L (ref 20–31)
CO2 SERPL-SCNC: 21 MMOL/L (ref 20–31)
COLOR UR: YELLOW
CREAT SERPL-MCNC: 3 MG/DL (ref 0.7–1.2)
CREAT SERPL-MCNC: 3.4 MG/DL (ref 0.7–1.2)
EKG ATRIAL RATE: 84 BPM
EKG P AXIS: 44 DEGREES
EKG P-R INTERVAL: 146 MS
EKG Q-T INTERVAL: 346 MS
EKG QRS DURATION: 86 MS
EKG QTC CALCULATION (BAZETT): 408 MS
EKG R AXIS: -10 DEGREES
EKG T AXIS: 96 DEGREES
EKG VENTRICULAR RATE: 84 BPM
EOSINOPHIL # BLD: 0.2 K/UL (ref 0–0.4)
EOSINOPHILS RELATIVE PERCENT: 3 % (ref 1–4)
EPI CELLS #/AREA URNS HPF: ABNORMAL /HPF (ref 0–5)
ERYTHROCYTE [DISTWIDTH] IN BLOOD BY AUTOMATED COUNT: 16.9 % (ref 12.5–15.4)
GFR, ESTIMATED: 18 ML/MIN/1.73M2
GFR, ESTIMATED: 21 ML/MIN/1.73M2
GLUCOSE BLD-MCNC: 102 MG/DL (ref 75–110)
GLUCOSE SERPL-MCNC: 151 MG/DL (ref 74–99)
GLUCOSE SERPL-MCNC: 214 MG/DL (ref 74–99)
GLUCOSE UR STRIP-MCNC: NEGATIVE MG/DL
HCT VFR BLD AUTO: 36.6 % (ref 41–53)
HGB BLD-MCNC: 12.4 G/DL (ref 13.5–17.5)
HGB UR QL STRIP.AUTO: ABNORMAL
KETONES UR STRIP-MCNC: NEGATIVE MG/DL
LEUKOCYTE ESTERASE UR QL STRIP: NEGATIVE
LYMPHOCYTES NFR BLD: 1.7 K/UL (ref 1–4.8)
LYMPHOCYTES RELATIVE PERCENT: 26 % (ref 24–44)
MAGNESIUM SERPL-MCNC: 2.2 MG/DL (ref 1.6–2.4)
MCH RBC QN AUTO: 34.9 PG (ref 26–34)
MCHC RBC AUTO-ENTMCNC: 33.8 G/DL (ref 31–37)
MCV RBC AUTO: 103.5 FL (ref 80–100)
MONOCYTES NFR BLD: 0.5 K/UL (ref 0.1–1.2)
MONOCYTES NFR BLD: 8 % (ref 2–11)
NEUTROPHILS NFR BLD: 62 % (ref 36–66)
NEUTS SEG NFR BLD: 4.2 K/UL (ref 1.8–7.7)
NITRITE UR QL STRIP: NEGATIVE
PH UR STRIP: 6 [PH] (ref 5–8)
PLATELET # BLD AUTO: 185 K/UL (ref 140–450)
PMV BLD AUTO: 7.1 FL (ref 6–12)
POTASSIUM SERPL-SCNC: 5.8 MMOL/L (ref 3.7–5.3)
POTASSIUM SERPL-SCNC: 5.9 MMOL/L (ref 3.7–5.3)
PROT UR STRIP-MCNC: ABNORMAL MG/DL
RBC # BLD AUTO: 3.54 M/UL (ref 4.5–5.9)
RBC #/AREA URNS HPF: ABNORMAL /HPF (ref 0–2)
SODIUM SERPL-SCNC: 134 MMOL/L (ref 136–145)
SODIUM SERPL-SCNC: 136 MMOL/L (ref 136–145)
SP GR UR STRIP: 1.02 (ref 1–1.03)
TROPONIN I SERPL HS-MCNC: 39 NG/L (ref 0–22)
UROBILINOGEN UR STRIP-ACNC: NORMAL EU/DL (ref 0–1)
WBC #/AREA URNS HPF: ABNORMAL /HPF (ref 0–5)
WBC OTHER # BLD: 6.7 K/UL (ref 3.5–11)

## 2025-07-03 PROCEDURE — 99285 EMERGENCY DEPT VISIT HI MDM: CPT

## 2025-07-03 PROCEDURE — 36415 COLL VENOUS BLD VENIPUNCTURE: CPT

## 2025-07-03 PROCEDURE — 80048 BASIC METABOLIC PNL TOTAL CA: CPT

## 2025-07-03 PROCEDURE — 2580000003 HC RX 258

## 2025-07-03 PROCEDURE — 6360000002 HC RX W HCPCS

## 2025-07-03 PROCEDURE — 2500000003 HC RX 250 WO HCPCS

## 2025-07-03 PROCEDURE — 84484 ASSAY OF TROPONIN QUANT: CPT

## 2025-07-03 PROCEDURE — 2580000003 HC RX 258: Performed by: NURSE PRACTITIONER

## 2025-07-03 PROCEDURE — 93005 ELECTROCARDIOGRAM TRACING: CPT | Performed by: EMERGENCY MEDICINE

## 2025-07-03 PROCEDURE — 6360000002 HC RX W HCPCS: Performed by: NURSE PRACTITIONER

## 2025-07-03 PROCEDURE — 81001 URINALYSIS AUTO W/SCOPE: CPT

## 2025-07-03 PROCEDURE — 85025 COMPLETE CBC W/AUTO DIFF WBC: CPT

## 2025-07-03 PROCEDURE — 93010 ELECTROCARDIOGRAM REPORT: CPT | Performed by: INTERNAL MEDICINE

## 2025-07-03 PROCEDURE — 1200000000 HC SEMI PRIVATE

## 2025-07-03 PROCEDURE — 6370000000 HC RX 637 (ALT 250 FOR IP): Performed by: NURSE PRACTITIONER

## 2025-07-03 PROCEDURE — 83735 ASSAY OF MAGNESIUM: CPT

## 2025-07-03 PROCEDURE — 82947 ASSAY GLUCOSE BLOOD QUANT: CPT

## 2025-07-03 PROCEDURE — 99222 1ST HOSP IP/OBS MODERATE 55: CPT

## 2025-07-03 PROCEDURE — 96374 THER/PROPH/DIAG INJ IV PUSH: CPT

## 2025-07-03 RX ORDER — INSULIN GLARGINE 100 [IU]/ML
30 INJECTION, SOLUTION SUBCUTANEOUS 2 TIMES DAILY WITH MEALS
Status: DISCONTINUED | OUTPATIENT
Start: 2025-07-03 | End: 2025-07-04

## 2025-07-03 RX ORDER — ACETAMINOPHEN 325 MG/1
650 TABLET ORAL EVERY 6 HOURS PRN
Status: DISCONTINUED | OUTPATIENT
Start: 2025-07-03 | End: 2025-07-08 | Stop reason: HOSPADM

## 2025-07-03 RX ORDER — SODIUM CHLORIDE 9 MG/ML
INJECTION, SOLUTION INTRAVENOUS PRN
Status: DISCONTINUED | OUTPATIENT
Start: 2025-07-03 | End: 2025-07-08 | Stop reason: HOSPADM

## 2025-07-03 RX ORDER — 0.9 % SODIUM CHLORIDE 0.9 %
500 INTRAVENOUS SOLUTION INTRAVENOUS ONCE
Status: COMPLETED | OUTPATIENT
Start: 2025-07-03 | End: 2025-07-03

## 2025-07-03 RX ORDER — SODIUM CHLORIDE 9 MG/ML
INJECTION, SOLUTION INTRAVENOUS CONTINUOUS
Status: DISCONTINUED | OUTPATIENT
Start: 2025-07-03 | End: 2025-07-05

## 2025-07-03 RX ORDER — ONDANSETRON 2 MG/ML
4 INJECTION INTRAMUSCULAR; INTRAVENOUS EVERY 6 HOURS PRN
Status: DISCONTINUED | OUTPATIENT
Start: 2025-07-03 | End: 2025-07-08 | Stop reason: HOSPADM

## 2025-07-03 RX ORDER — SODIUM CHLORIDE 0.9 % (FLUSH) 0.9 %
10 SYRINGE (ML) INJECTION PRN
Status: DISCONTINUED | OUTPATIENT
Start: 2025-07-03 | End: 2025-07-08 | Stop reason: HOSPADM

## 2025-07-03 RX ORDER — LABETALOL HYDROCHLORIDE 5 MG/ML
10 INJECTION, SOLUTION INTRAVENOUS ONCE
Status: COMPLETED | OUTPATIENT
Start: 2025-07-03 | End: 2025-07-03

## 2025-07-03 RX ORDER — DEXTROSE MONOHYDRATE 100 MG/ML
INJECTION, SOLUTION INTRAVENOUS CONTINUOUS PRN
Status: DISCONTINUED | OUTPATIENT
Start: 2025-07-03 | End: 2025-07-08 | Stop reason: HOSPADM

## 2025-07-03 RX ORDER — ATORVASTATIN CALCIUM 10 MG/1
10 TABLET, FILM COATED ORAL DAILY
Status: DISCONTINUED | OUTPATIENT
Start: 2025-07-04 | End: 2025-07-08 | Stop reason: HOSPADM

## 2025-07-03 RX ORDER — HEPARIN SODIUM 5000 [USP'U]/ML
5000 INJECTION, SOLUTION INTRAVENOUS; SUBCUTANEOUS EVERY 8 HOURS SCHEDULED
Status: DISCONTINUED | OUTPATIENT
Start: 2025-07-03 | End: 2025-07-08 | Stop reason: HOSPADM

## 2025-07-03 RX ORDER — POLYETHYLENE GLYCOL 3350 17 G/17G
17 POWDER, FOR SOLUTION ORAL DAILY PRN
Status: DISCONTINUED | OUTPATIENT
Start: 2025-07-03 | End: 2025-07-08 | Stop reason: HOSPADM

## 2025-07-03 RX ORDER — CALCIUM GLUCONATE 20 MG/ML
1000 INJECTION, SOLUTION INTRAVENOUS ONCE
Status: COMPLETED | OUTPATIENT
Start: 2025-07-03 | End: 2025-07-03

## 2025-07-03 RX ORDER — SODIUM CHLORIDE 0.9 % (FLUSH) 0.9 %
5-40 SYRINGE (ML) INJECTION EVERY 12 HOURS SCHEDULED
Status: DISCONTINUED | OUTPATIENT
Start: 2025-07-03 | End: 2025-07-08 | Stop reason: HOSPADM

## 2025-07-03 RX ORDER — ACETAMINOPHEN 650 MG/1
650 SUPPOSITORY RECTAL EVERY 6 HOURS PRN
Status: DISCONTINUED | OUTPATIENT
Start: 2025-07-03 | End: 2025-07-08 | Stop reason: HOSPADM

## 2025-07-03 RX ORDER — ONDANSETRON 4 MG/1
4 TABLET, ORALLY DISINTEGRATING ORAL EVERY 8 HOURS PRN
Status: DISCONTINUED | OUTPATIENT
Start: 2025-07-03 | End: 2025-07-08 | Stop reason: HOSPADM

## 2025-07-03 RX ORDER — GLUCAGON 1 MG/ML
1 KIT INJECTION PRN
Status: DISCONTINUED | OUTPATIENT
Start: 2025-07-03 | End: 2025-07-08 | Stop reason: HOSPADM

## 2025-07-03 RX ADMIN — CALCIUM GLUCONATE 1000 MG: 20 INJECTION, SOLUTION INTRAVENOUS at 13:10

## 2025-07-03 RX ADMIN — SODIUM CHLORIDE: 0.9 INJECTION, SOLUTION INTRAVENOUS at 20:12

## 2025-07-03 RX ADMIN — SODIUM ZIRCONIUM CYCLOSILICATE 10 G: 10 POWDER, FOR SUSPENSION ORAL at 13:07

## 2025-07-03 RX ADMIN — HEPARIN SODIUM 5000 UNITS: 5000 INJECTION INTRAVENOUS; SUBCUTANEOUS at 21:34

## 2025-07-03 RX ADMIN — SODIUM CHLORIDE 500 ML: 0.9 INJECTION, SOLUTION INTRAVENOUS at 13:12

## 2025-07-03 RX ADMIN — SODIUM CHLORIDE, PRESERVATIVE FREE 10 ML: 5 INJECTION INTRAVENOUS at 20:04

## 2025-07-03 RX ADMIN — Medication 10 MG: at 20:44

## 2025-07-03 ASSESSMENT — PAIN - FUNCTIONAL ASSESSMENT
PAIN_FUNCTIONAL_ASSESSMENT: 0-10
PAIN_FUNCTIONAL_ASSESSMENT: NONE - DENIES PAIN

## 2025-07-03 NOTE — ED PROVIDER NOTES
[facility] Emergency Department  [address]  [place] [address]  Phone: [phone]  Fax: [phone]      Attending Physician Attestation    I performed a history and physical examination of the patient and discussed management with the mid level provideer. I reviewed the mid level provider's note and agree with the documented findings and plan of care. Any areas of disagreement are noted on the chart. I was personally present for the key portions of any procedures. I have documented in the chart those procedures where I was not present during the key portions. I have reviewed the emergency nurses triage note. I agree with the chief complaint, past medical history, past surgical history, allergies, medications, social and family history as documented unless otherwise noted below. Documentation of the HPI, Physical Exam and Medical Decision Making performed by mid level providers is based on my personal performance of the HPI, PE and MDM. For Physician Assistant/ Nurse Practitioner cases/documentation I have personally evaluated this patient and have completed at least one if not all key elements of the E/M (history, physical exam, and MDM). Additional findings are as noted.      CHIEF COMPLAINT       Chief Complaint   Patient presents with    Abnormal Lab     Patient had some lab work done yesterday and PCP called him to come in. Patient has a high Potassium level 6.4, creatinine 2.8, and BUN 36. Patient was placed on Bactrim for a UTI on 6/24/25. Patient's wife stated patient has been more confused than normal and patient only has one kidney.          PAST MEDICAL HISTORY    has a past medical history of BOOP (bronchiolitis obliterans with organizing pneumonia) ([facility]), Chronic obstructive pulmonary disease (HCC), Deep vein thrombosis of lower extremity (HCC), Difficult intravenous access, Hx of blood clots, HX OTHER MEDICAL, Hypertension, Obesity, On supplemental oxygen therapy, Pulmonary  20 - 31 mmol/L    Anion Gap 11 9 - 16 mmol/L    Glucose 214 (H) 74 - 99 mg/dL    BUN 47 (H) 8 - 23 mg/dL    Creatinine 3.4 (H) 0.7 - 1.2 mg/dL    Est, Glom Filt Rate 18 (L) >60 mL/min/1.73m2    Calcium 9.0 8.6 - 10.4 mg/dL   Magnesium   Result Value Ref Range    Magnesium 2.2 1.6 - 2.4 mg/dL   Troponin   Result Value Ref Range    Troponin, High Sensitivity 39 (H) 0 - 22 ng/L   Urinalysis with Reflex to Culture    Specimen: Urine   Result Value Ref Range    Color, UA PENDING Yellow    Turbidity UA PENDING Clear    Glucose, Ur PENDING NEGATIVE mg/dL    Bilirubin, Urine PENDING NEGATIVE    Ketones, Urine PENDING NEGATIVE mg/dL    Specific Gravity, UA PENDING     Urine Hgb PENDING NEGATIVE    pH, Urine PENDING     Protein, UA PENDING NEGATIVE mg/dL    Urobilinogen, Urine PENDING 0.0 - 1.0 EU/dL    Nitrite, Urine PENDING NEGATIVE    Leukocyte Esterase, Urine PENDING NEGATIVE    Comment PENDING    EKG 12 Lead   Result Value Ref Range    Ventricular Rate 84 BPM    Atrial Rate 84 BPM    P-R Interval 146 ms    QRS Duration 86 ms    Q-T Interval 346 ms    QTc Calculation (Bazett) 408 ms    P Axis 44 degrees    R Axis -10 degrees    T Axis 96 degrees         EMERGENCY DEPARTMENT COURSE:   Vitals:    Vitals:    07/03/25 1110 07/03/25 1131 07/03/25 1314   BP: (!) 167/89     Pulse: 90 82 82   Resp: 18 23 22   Temp: 97.7 °F (36.5 °C)     TempSrc: Oral     SpO2: 98% 97% 98%   Weight: 107 kg (236 lb)     Height: 1.753 m (5' 9\")       -------------------------  BP: (!) 167/89, Temp: 97.7 °F (36.5 °C), Pulse: 82, Respirations: 22      CONSULTS:    None    CRITICAL CARE:     None    PROCEDURES:    None    FINAL IMPRESSION      1. JOESPH (acute kidney injury)    2. Hyperkalemia          DISPOSITION/PLAN   DISPOSITION Decision To Transfer 07/03/2025 01:03:51 PM   DISPOSITION CONDITION Stable           Condition on Disposition    Improved    PATIENT REFERRED TO:  No follow-up provider specified.    DISCHARGE MEDICATIONS:  New Prescriptions

## 2025-07-03 NOTE — H&P
[facility]  Office: [phone]  Tomasz Aguilar DO, Jesus Ayers, DO, Bart Cortez DO, Roddy Zavala DO, Adrien Morton MD, Kalyn Bansal MD, Destiny Christian MD, Nisha Arvizu MD,  Jassi Isaac MD, Carla Estrada MD, Kris Burnett MD,  Alexandra Cesar DO, Cher Maciel MD, Stevie Cedeno MD, Carlos Aguilar DO, Serena Lopez MD,  Pepe Romeo DO, Harper Moses MD, Brandy Espino MD, Danyel Oliver MD,  Carlos Stacy MD, José Miguel Lyon MD, Clark Fan MD, Idania Farrell MD, Oliver Casanova MD, Sam Loza MD, Shree Sinha, DO, Oliva Ayala MD, Spike Benz DO, Christ Cool MD, Alexandra Maki MD, Mohsin Reza, MD, Georgette Reeves MD, Shirley Waterhouse, CNP,  Rochelle Olvera CNP, Shree Covarrubias, CNP,  Yuli Lee, SHWETA, Hermelinda Sewell CNP, Julia Ferrera, CNP, Gracia Rollins, CNP, Padma Paige, CNP, Amira Tavares, PA-C, Mary Frost, CNP, Carol Alas, CNP,  Herlinda Fish, CNP, Matilda Raygoza, CNP, Kasi Belcher PA-C, VANESSA MckeonC, Laquita Johnson, CNP,        Chelsea Forrest, CNS, Gabriella Ruano, CNP, Serenity Walsh, CNP         [facility]   IN-PATIENT SERVICE   [facility]    HISTORY AND PHYSICAL EXAMINATION            Date:   7/3/2025  Patient name:  Eber Bolden  Date of admission:  7/3/2025  4:26 PM  MRN:   [id number]  Account:  [id number]  YOB: 1950  PCP:    Jose De Jesus Lancaster PA-C  Room:   0318/0318-01  Code Status:    Full Code    Chief Complaint:     No chief complaint on file.      History Obtained From:     patient, spouse, family member - granddaughter, electronic medical record    History of Present Illness:     Eber Bolden is a 74 y.o. Non- / non  male with past medical history of CKD stage IIIa, s/p donation of kidney, diabetes mellitus type 2 with long-term current use of insulin, and hyperlipidemia who presents with No chief complaint on file.   and is admitted to the hospital for the management

## 2025-07-03 NOTE — ED NOTES
Report given to DUSTIN Dominguez from [address] [place] [address].   Report method by phone   The following was reviewed with receiving RN:   Current vital signs:  BP (!) 144/103   Pulse 76   Temp 97.7 °F (36.5 °C) (Oral)   Resp 18   Ht 1.753 m (5' 9\")   Wt 107 kg (236 lb)   SpO2 98%   BMI 34.85 kg/m²                      Any medication or safety alerts were reviewed. Any pending diagnostics and notifications were also reviewed, as well as any safety concerns or issues, abnormal labs, abnormal imaging, and abnormal assessment findings. Questions were answered.

## 2025-07-03 NOTE — PROGRESS NOTES
Spiritual Health History and Assessment/Progress Note  [facility]    (P) Spiritual/Emotional Needs, Initial Encounter,  , (P) Life Adjustments, Adjustment to illness,      Name: Eber Bolden MRN: [id number]    Age: 74 y.o.     Sex: male   Language: English   Confucianism: Mu-ism   <principal problem not specified>     Date: 7/3/2025            Total Time Calculated: (P) 15 min              Spiritual Assessment began in [facility] EMERGENCY DEPARTMENT        Referral/Consult From: (P) Rounding   Encounter Overview/Reason: (P) Spiritual/Emotional Needs, Initial Encounter  Service Provided For: (P) Patient, Significant other, Patient and family together        provided support and pastoral care and support. Pt. Was welcoming and open to conversation.  Pt. Was being transferred to another hospital out of necessity.   provided active listening and encouragement. Family was present.  offered pastoral presence and comfort.    Gemma, Belief, Meaning:   Patient has beliefs or practices that help with coping during difficult times  Family/Friends have beliefs or practices that help with coping during difficult times      Importance and Influence:  Patient has spiritual/personal beliefs that influence decisions regarding their health  Family/Friends have spiritual/personal beliefs that influence decisions regarding the patient's health    Community:  Patient feels well-supported. Support system includes: Spouse/Partner  Family/Friends feel well-supported. Support system includes: Spouse/Partner    Assessment and Plan of Care:     Patient Interventions include: Facilitated expression of thoughts and feelings and Explored spiritual coping/struggle/distress  Family/Friends Interventions include: Facilitated expression of thoughts and feelings and Explored spiritual coping/struggle/distress    Patient Plan of Care: Spiritual Care available upon further referral  Family/Friends Plan of  Care: Spiritual Care available upon further referral    Electronically signed by Chaplain PLALAVI on 7/3/2025 at 3:21 PM    07/03/25 1519   Encounter Summary   Encounter Overview/Reason Spiritual/Emotional Needs;Initial Encounter   Service Provided For Patient;Significant other;Patient and family together   Referral/Consult From [facility]   Support System Spouse   Last Encounter  07/03/25   Complexity of Encounter Moderate   Begin Time 1430   End Time  1445   Total Time Calculated 15 min   Spiritual/Emotional needs   Type Spiritual Support   Grief, Loss, and Adjustments   Type Life Adjustments;Adjustment to illness   Assessment/Intervention/Outcome   Assessment Anxious;Coping   Intervention Sustaining Presence/Ministry of presence;Prayer (assurance of)/[place];Nurtured Hope;Active listening;Discussed belief system/Pentecostalism practices/tucker;Discussed meaning/purpose   Outcome Comfort;Coping;Encouraged;Expressed Gratitude;Receptive   Plan and Referrals   Plan/Referrals No future visits requested

## 2025-07-03 NOTE — ED NOTES
Patient discussed medications with CNP. Patient gave himself 28 units SQ insulin for a blood sugar of 269. Patient was provided lunch.

## 2025-07-03 NOTE — ED PROVIDER NOTES
[facility] EMERGENCY DEPARTMENT  EMERGENCY DEPARTMENT ENCOUNTER      Pt Name: Eber Bolden  MRN: [id number]  Birthdate 1950  Date of evaluation: 7/3/2025  Provider: JAGJIT García CNP  4:04 PM    CHIEF COMPLAINT       Chief Complaint   Patient presents with    Abnormal Lab     Patient had some lab work done yesterday and PCP called him to come in. Patient has a high Potassium level 6.4, creatinine 2.8, and BUN 36. Patient was placed on Bactrim for a UTI on 6/24/25. Patient's wife stated patient has been more confused than normal and patient only has one kidney.          HISTORY OF PRESENT ILLNESS    Eber Bolden is a 74 y.o. male who presents to the emergency department for evaluation of abnormal labs.  Patient states that his blood work was drawn yesterday by PCP and he was told that his potassium was elevated.  Patient has a history of JOESPH, on June 24, he was placed on Bactrim for a UTI, only has 1 kidney.  He donated that in 1983 to his brother.  Has been through JOESPH's previously.  Does not follow with a nephrologist and admits that he does not drink a lot of fluid.  He denies any pain no chest pain.  No shortness of breath.  Wife reports confusion and some irritability  HPI    Nursing Notes were reviewed.    REVIEW OF SYSTEMS       Review of Systems   All other systems reviewed and are negative.      Except as noted above the remainder of the review of systems was reviewed and negative.       PAST MEDICAL HISTORY     Past Medical History:   Diagnosis Date    BOOP (bronchiolitis obliterans with organizing pneumonia) ([facility]) 04/2014    SUSPECTED CAUSE GROUND MOLD OR POLLEN SPORES, O2  AS NEEDED AT HOME    Chronic obstructive pulmonary disease ([facility]) 02/12/2014    Deep vein thrombosis of lower extremity ([facility]) 07/21/2023    Difficult intravenous access     STATES HAND TH E BEST PLACE TO START    Hx of blood clots 2014    BILAT LEGS AND LUNG TREATED WITH COUMADIN    HX OTHER MEDICAL     BOOP Bronchial  follow-up provider specified.    DISCHARGE MEDICATIONS:  Discharge Medication List as of 7/3/2025  4:03 PM        Controlled Substances Monitoring:          No data to display                (Please note that portions of this note were completed with a voice recognition program.  Efforts were made to edit the dictations but occasionally words are mis-transcribed.)    JAGJIT García CNP (electronically signed)             Sunshine Keita APRN - CNP  07/03/25 [id number]

## 2025-07-04 LAB
ALBUMIN SERPL-MCNC: 3.9 G/DL (ref 3.5–5.2)
ALBUMIN/GLOB SERPL: 1.3 {RATIO} (ref 1–2.5)
ALP SERPL-CCNC: 80 U/L (ref 40–129)
ALT SERPL-CCNC: 27 U/L (ref 10–50)
ANION GAP SERPL CALCULATED.3IONS-SCNC: 12 MMOL/L (ref 9–16)
ANION GAP SERPL CALCULATED.3IONS-SCNC: 13 MMOL/L (ref 9–16)
AST SERPL-CCNC: 31 U/L (ref 10–50)
BILIRUB DIRECT SERPL-MCNC: <0.1 MG/DL (ref 0–0.2)
BILIRUB INDIRECT SERPL-MCNC: NORMAL MG/DL (ref 0–1)
BILIRUB SERPL-MCNC: 0.3 MG/DL (ref 0–1.2)
BUN SERPL-MCNC: 44 MG/DL (ref 8–23)
C3 SERPL-MCNC: 103 MG/DL (ref 90–180)
C4 SERPL-MCNC: 13 MG/DL (ref 10–40)
CALCIUM SERPL-MCNC: 9.3 MG/DL (ref 8.6–10.4)
CHLORIDE SERPL-SCNC: 107 MMOL/L (ref 98–107)
CHLORIDE SERPL-SCNC: 108 MMOL/L (ref 98–107)
CO2 SERPL-SCNC: 19 MMOL/L (ref 20–31)
CO2 SERPL-SCNC: 19 MMOL/L (ref 20–31)
CREAT SERPL-MCNC: 2.6 MG/DL (ref 0.7–1.2)
CREAT UR-MCNC: 72 MG/DL (ref 39–259)
EOSINOPHIL,URINE: NORMAL
FREE KAPPA/LAMBDA RATIO: 1.94 (ref 0.22–1.74)
GFR, ESTIMATED: 25 ML/MIN/1.73M2
GLOBULIN SER CALC-MCNC: 3 G/DL
GLUCOSE BLD-MCNC: 148 MG/DL (ref 75–110)
GLUCOSE BLD-MCNC: 157 MG/DL (ref 75–110)
GLUCOSE BLD-MCNC: 164 MG/DL (ref 75–110)
GLUCOSE BLD-MCNC: 47 MG/DL (ref 75–110)
GLUCOSE BLD-MCNC: 70 MG/DL (ref 75–110)
GLUCOSE BLD-MCNC: 98 MG/DL (ref 75–110)
GLUCOSE SERPL-MCNC: 154 MG/DL (ref 74–99)
INR PPP: 1
KAPPA LC FREE SER-MCNC: 45.8 MG/L
LAMBDA LC FREE SERPL-MCNC: 23.6 MG/L (ref 4.2–27.7)
POTASSIUM SERPL-SCNC: 5.2 MMOL/L (ref 3.7–5.3)
POTASSIUM SERPL-SCNC: 5.5 MMOL/L (ref 3.7–5.3)
PROT SERPL-MCNC: 6.9 G/DL (ref 6.6–8.7)
PROTHROMBIN TIME: 13.3 SEC (ref 11.7–14.9)
SODIUM SERPL-SCNC: 138 MMOL/L (ref 136–145)
SODIUM SERPL-SCNC: 140 MMOL/L (ref 136–145)
SODIUM UR-SCNC: 85 MMOL/L
TOTAL PROTEIN, URINE: <4 MG/DL
TSH SERPL DL<=0.05 MIU/L-ACNC: 1.38 UIU/ML (ref 0.27–4.2)

## 2025-07-04 PROCEDURE — 80076 HEPATIC FUNCTION PANEL: CPT

## 2025-07-04 PROCEDURE — 87205 SMEAR GRAM STAIN: CPT

## 2025-07-04 PROCEDURE — 80307 DRUG TEST PRSMV CHEM ANLYZR: CPT

## 2025-07-04 PROCEDURE — 86160 COMPLEMENT ANTIGEN: CPT

## 2025-07-04 PROCEDURE — 80051 ELECTROLYTE PANEL: CPT

## 2025-07-04 PROCEDURE — 86038 ANTINUCLEAR ANTIBODIES: CPT

## 2025-07-04 PROCEDURE — 84165 PROTEIN E-PHORESIS SERUM: CPT

## 2025-07-04 PROCEDURE — 99233 SBSQ HOSP IP/OBS HIGH 50: CPT | Performed by: INTERNAL MEDICINE

## 2025-07-04 PROCEDURE — 1200000000 HC SEMI PRIVATE

## 2025-07-04 PROCEDURE — 99223 1ST HOSP IP/OBS HIGH 75: CPT | Performed by: INTERNAL MEDICINE

## 2025-07-04 PROCEDURE — 83521 IG LIGHT CHAINS FREE EACH: CPT

## 2025-07-04 PROCEDURE — 82947 ASSAY GLUCOSE BLOOD QUANT: CPT

## 2025-07-04 PROCEDURE — 84300 ASSAY OF URINE SODIUM: CPT

## 2025-07-04 PROCEDURE — 84156 ASSAY OF PROTEIN URINE: CPT

## 2025-07-04 PROCEDURE — 2580000003 HC RX 258: Performed by: INTERNAL MEDICINE

## 2025-07-04 PROCEDURE — 80048 BASIC METABOLIC PNL TOTAL CA: CPT

## 2025-07-04 PROCEDURE — 95816 EEG AWAKE AND DROWSY: CPT

## 2025-07-04 PROCEDURE — 85610 PROTHROMBIN TIME: CPT

## 2025-07-04 PROCEDURE — 6370000000 HC RX 637 (ALT 250 FOR IP)

## 2025-07-04 PROCEDURE — 6360000002 HC RX W HCPCS

## 2025-07-04 PROCEDURE — 6360000002 HC RX W HCPCS: Performed by: INTERNAL MEDICINE

## 2025-07-04 PROCEDURE — 82570 ASSAY OF URINE CREATININE: CPT

## 2025-07-04 PROCEDURE — 6370000000 HC RX 637 (ALT 250 FOR IP): Performed by: INTERNAL MEDICINE

## 2025-07-04 PROCEDURE — 84443 ASSAY THYROID STIM HORMONE: CPT

## 2025-07-04 PROCEDURE — 36415 COLL VENOUS BLD VENIPUNCTURE: CPT

## 2025-07-04 PROCEDURE — 86225 DNA ANTIBODY NATIVE: CPT

## 2025-07-04 PROCEDURE — 84155 ASSAY OF PROTEIN SERUM: CPT

## 2025-07-04 RX ORDER — INSULIN GLARGINE 100 [IU]/ML
30 INJECTION, SOLUTION SUBCUTANEOUS NIGHTLY
Status: DISCONTINUED | OUTPATIENT
Start: 2025-07-04 | End: 2025-07-08 | Stop reason: HOSPADM

## 2025-07-04 RX ORDER — INSULIN LISPRO 100 [IU]/ML
INJECTION, SOLUTION INTRAVENOUS; SUBCUTANEOUS
Qty: 5 ADJUSTABLE DOSE PRE-FILLED PEN SYRINGE | Refills: 2 | Status: SHIPPED | OUTPATIENT
Start: 2025-07-04 | End: 2025-07-08

## 2025-07-04 RX ORDER — CARVEDILOL 3.12 MG/1
3.12 TABLET ORAL 2 TIMES DAILY WITH MEALS
Status: DISCONTINUED | OUTPATIENT
Start: 2025-07-04 | End: 2025-07-05

## 2025-07-04 RX ORDER — HYDRALAZINE HYDROCHLORIDE 25 MG/1
25 TABLET, FILM COATED ORAL 4 TIMES DAILY
Qty: 120 TABLET | Refills: 3 | Status: SHIPPED | OUTPATIENT
Start: 2025-07-04 | End: 2025-07-08

## 2025-07-04 RX ORDER — LABETALOL HYDROCHLORIDE 5 MG/ML
10 INJECTION, SOLUTION INTRAVENOUS EVERY 4 HOURS PRN
Status: DISCONTINUED | OUTPATIENT
Start: 2025-07-04 | End: 2025-07-08 | Stop reason: HOSPADM

## 2025-07-04 RX ORDER — INSULIN GLARGINE 100 [IU]/ML
30 INJECTION, SOLUTION SUBCUTANEOUS NIGHTLY
Status: DISCONTINUED | OUTPATIENT
Start: 2025-07-05 | End: 2025-07-04

## 2025-07-04 RX ORDER — LANOLIN ALCOHOL/MO/W.PET/CERES
100 CREAM (GRAM) TOPICAL DAILY
Status: DISCONTINUED | OUTPATIENT
Start: 2025-07-04 | End: 2025-07-08 | Stop reason: HOSPADM

## 2025-07-04 RX ORDER — HYDRALAZINE HYDROCHLORIDE 50 MG/1
25 TABLET, FILM COATED ORAL EVERY 8 HOURS SCHEDULED
Status: DISCONTINUED | OUTPATIENT
Start: 2025-07-04 | End: 2025-07-04

## 2025-07-04 RX ORDER — SODIUM BICARBONATE 650 MG/1
1300 TABLET ORAL 2 TIMES DAILY
Status: DISCONTINUED | OUTPATIENT
Start: 2025-07-04 | End: 2025-07-05

## 2025-07-04 RX ORDER — HYDRALAZINE HYDROCHLORIDE 50 MG/1
25 TABLET, FILM COATED ORAL EVERY 6 HOURS SCHEDULED
Status: DISCONTINUED | OUTPATIENT
Start: 2025-07-04 | End: 2025-07-08 | Stop reason: HOSPADM

## 2025-07-04 RX ORDER — CARVEDILOL 3.12 MG/1
3.12 TABLET ORAL 2 TIMES DAILY
Qty: 60 TABLET | Refills: 3 | Status: SHIPPED | OUTPATIENT
Start: 2025-07-04 | End: 2025-07-08 | Stop reason: HOSPADM

## 2025-07-04 RX ADMIN — Medication 10 MG: at 16:48

## 2025-07-04 RX ADMIN — Medication 10 MG: at 09:12

## 2025-07-04 RX ADMIN — HYDRALAZINE HYDROCHLORIDE 25 MG: 50 TABLET ORAL at 09:10

## 2025-07-04 RX ADMIN — Medication 100 MG: at 14:10

## 2025-07-04 RX ADMIN — HEPARIN SODIUM 5000 UNITS: 5000 INJECTION INTRAVENOUS; SUBCUTANEOUS at 20:57

## 2025-07-04 RX ADMIN — SODIUM BICARBONATE 1300 MG: 650 TABLET ORAL at 14:10

## 2025-07-04 RX ADMIN — SODIUM ZIRCONIUM CYCLOSILICATE 5 G: 5 POWDER, FOR SUSPENSION ORAL at 08:30

## 2025-07-04 RX ADMIN — SODIUM CHLORIDE: 0.9 INJECTION, SOLUTION INTRAVENOUS at 14:09

## 2025-07-04 RX ADMIN — HEPARIN SODIUM 5000 UNITS: 5000 INJECTION INTRAVENOUS; SUBCUTANEOUS at 14:10

## 2025-07-04 RX ADMIN — ATORVASTATIN CALCIUM 10 MG: 10 TABLET, FILM COATED ORAL at 08:30

## 2025-07-04 RX ADMIN — SODIUM CHLORIDE: 0.9 INJECTION, SOLUTION INTRAVENOUS at 23:46

## 2025-07-04 RX ADMIN — Medication 10 MG: at 20:58

## 2025-07-04 RX ADMIN — HYDRALAZINE HYDROCHLORIDE 25 MG: 50 TABLET ORAL at 16:48

## 2025-07-04 RX ADMIN — CARVEDILOL 3.12 MG: 3.12 TABLET, FILM COATED ORAL at 14:10

## 2025-07-04 RX ADMIN — Medication 16 G: at 02:02

## 2025-07-04 RX ADMIN — HEPARIN SODIUM 5000 UNITS: 5000 INJECTION INTRAVENOUS; SUBCUTANEOUS at 06:40

## 2025-07-04 RX ADMIN — SODIUM BICARBONATE 1300 MG: 650 TABLET ORAL at 20:58

## 2025-07-04 NOTE — PROCEDURES
PROCEDURE NOTE  Date: 7/4/2025   Name: Eber Bolden  YOB: 1950    EEG    Date/Time: 7/4/2025 5:00 PM    Performed by: Aleksandar Seay MD  Authorized by: Harper Moses MD        EEG REPORT     CLINICAL NEUROPHYSIOLOGY LABORATORY  DEPARTMENT OF NEUROLOGY  [facility]       Patient: Eber Bolden Age: 74 y.o.  MRN: [id number]      Referring Provider: Sunshine Keita APR*    History: This routine 30 minute scalp EEG was recorded with video- monitoring for a 74 y.o.. male  who presented with encephalopathy. This EEG was performed to evaluate for focal and epileptiform abnormalities.     Eber Bolden   Current Facility-Administered Medications   Medication Dose Route Frequency Provider Last Rate Last Admin    labetalol (NORMODYNE;TRANDATE) injection 10 mg  10 mg IntraVENous Q4H PRN Harper Moses MD   10 mg at 07/04/25 1648    [Held by provider] insulin glargine (LANTUS) injection vial 30 Units  30 Units SubCUTAneous Nightly Harper Moses MD        hydrALAZINE (APRESOLINE) tablet 25 mg  25 mg Oral 4 times per day Harper Moses MD   25 mg at 07/04/25 1648    carvedilol (COREG) tablet 3.125 mg  3.125 mg Oral BID WC Harper Moses MD   3.125 mg at 07/04/25 1410    thiamine tablet 100 mg  100 mg Oral Daily Harper Moses MD   100 mg at 07/04/25 1410    sodium bicarbonate tablet 1,300 mg  1,300 mg Oral BID Carroll Méndez MD   1,300 mg at 07/04/25 1410    sodium chloride flush 0.9 % injection 5-40 mL  5-40 mL IntraVENous 2 times per day Kasi Belcher PA-C   10 mL at 07/03/25 2004    sodium chloride flush 0.9 % injection 10 mL  10 mL IntraVENous PRN Kasi Belcher PA-C        0.9 % sodium chloride infusion   IntraVENous PRN Kasi Belcher PA-C        ondansetron (ZOFRAN-ODT) disintegrating tablet 4 mg  4 mg Oral Q8H PRN Kasi Belcher PA-C        Or    ondansetron (ZOFRAN) injection 4 mg  4 mg IntraVENous Q6H PRN Kasi Belcher PA-C        polyethylene glycol (GLYCOLAX) packet 17 g  17 g Oral Daily

## 2025-07-05 ENCOUNTER — APPOINTMENT (OUTPATIENT)
Dept: MRI IMAGING | Age: 75
DRG: 981 | End: 2025-07-05
Attending: HOSPITALIST
Payer: MEDICARE

## 2025-07-05 ENCOUNTER — APPOINTMENT (OUTPATIENT)
Dept: ULTRASOUND IMAGING | Age: 75
DRG: 981 | End: 2025-07-05
Attending: HOSPITALIST
Payer: MEDICARE

## 2025-07-05 LAB
AMPHET UR QL SCN: NEGATIVE
ANION GAP SERPL CALCULATED.3IONS-SCNC: 11 MMOL/L (ref 9–16)
BARBITURATES UR QL SCN: NEGATIVE
BENZODIAZ UR QL: NEGATIVE
BUN SERPL-MCNC: 37 MG/DL (ref 8–23)
CALCIUM SERPL-MCNC: 9.1 MG/DL (ref 8.6–10.4)
CANNABINOIDS UR QL SCN: NEGATIVE
CHLORIDE SERPL-SCNC: 107 MMOL/L (ref 98–107)
CHOLEST SERPL-MCNC: 105 MG/DL (ref 0–199)
CHOLESTEROL/HDL RATIO: 4
CO2 SERPL-SCNC: 22 MMOL/L (ref 20–31)
COCAINE UR QL SCN: NEGATIVE
CREAT SERPL-MCNC: 1.9 MG/DL (ref 0.7–1.2)
FENTANYL UR QL: NEGATIVE
GFR, ESTIMATED: 36 ML/MIN/1.73M2
GLUCOSE BLD-MCNC: 124 MG/DL (ref 75–110)
GLUCOSE BLD-MCNC: 176 MG/DL (ref 75–110)
GLUCOSE BLD-MCNC: 188 MG/DL (ref 75–110)
GLUCOSE BLD-MCNC: 213 MG/DL (ref 75–110)
GLUCOSE SERPL-MCNC: 109 MG/DL (ref 74–99)
HDLC SERPL-MCNC: 26 MG/DL
LDLC SERPL CALC-MCNC: 41 MG/DL (ref 0–100)
METHADONE UR QL: NEGATIVE
OPIATES UR QL SCN: NEGATIVE
OXYCODONE UR QL SCN: NEGATIVE
PCP UR QL SCN: NEGATIVE
POTASSIUM SERPL-SCNC: 4.9 MMOL/L (ref 3.7–5.3)
SODIUM SERPL-SCNC: 140 MMOL/L (ref 136–145)
TEST INFORMATION: NORMAL
TRIGL SERPL-MCNC: 189 MG/DL
VLDLC SERPL CALC-MCNC: 38 MG/DL (ref 1–30)

## 2025-07-05 PROCEDURE — 6370000000 HC RX 637 (ALT 250 FOR IP): Performed by: INTERNAL MEDICINE

## 2025-07-05 PROCEDURE — 82607 VITAMIN B-12: CPT

## 2025-07-05 PROCEDURE — 99233 SBSQ HOSP IP/OBS HIGH 50: CPT | Performed by: INTERNAL MEDICINE

## 2025-07-05 PROCEDURE — 1200000000 HC SEMI PRIVATE

## 2025-07-05 PROCEDURE — 6360000002 HC RX W HCPCS

## 2025-07-05 PROCEDURE — 36415 COLL VENOUS BLD VENIPUNCTURE: CPT

## 2025-07-05 PROCEDURE — 99232 SBSQ HOSP IP/OBS MODERATE 35: CPT | Performed by: INTERNAL MEDICINE

## 2025-07-05 PROCEDURE — 84425 ASSAY OF VITAMIN B-1: CPT

## 2025-07-05 PROCEDURE — 82947 ASSAY GLUCOSE BLOOD QUANT: CPT

## 2025-07-05 PROCEDURE — 76770 US EXAM ABDO BACK WALL COMP: CPT

## 2025-07-05 PROCEDURE — 2500000003 HC RX 250 WO HCPCS

## 2025-07-05 PROCEDURE — 6370000000 HC RX 637 (ALT 250 FOR IP)

## 2025-07-05 PROCEDURE — 80061 LIPID PANEL: CPT

## 2025-07-05 PROCEDURE — 82306 VITAMIN D 25 HYDROXY: CPT

## 2025-07-05 PROCEDURE — 6360000002 HC RX W HCPCS: Performed by: INTERNAL MEDICINE

## 2025-07-05 PROCEDURE — 80048 BASIC METABOLIC PNL TOTAL CA: CPT

## 2025-07-05 PROCEDURE — 70551 MRI BRAIN STEM W/O DYE: CPT

## 2025-07-05 PROCEDURE — 82746 ASSAY OF FOLIC ACID SERUM: CPT

## 2025-07-05 PROCEDURE — 84446 ASSAY OF VITAMIN E: CPT

## 2025-07-05 RX ORDER — AMLODIPINE BESYLATE 10 MG/1
10 TABLET ORAL DAILY
Status: DISCONTINUED | OUTPATIENT
Start: 2025-07-05 | End: 2025-07-08 | Stop reason: HOSPADM

## 2025-07-05 RX ORDER — SODIUM BICARBONATE 650 MG/1
650 TABLET ORAL 3 TIMES DAILY
Status: DISCONTINUED | OUTPATIENT
Start: 2025-07-05 | End: 2025-07-08 | Stop reason: HOSPADM

## 2025-07-05 RX ORDER — ASPIRIN 81 MG/1
81 TABLET, CHEWABLE ORAL DAILY
Status: DISCONTINUED | OUTPATIENT
Start: 2025-07-05 | End: 2025-07-08 | Stop reason: HOSPADM

## 2025-07-05 RX ORDER — CARVEDILOL 6.25 MG/1
6.25 TABLET ORAL 2 TIMES DAILY WITH MEALS
Status: DISCONTINUED | OUTPATIENT
Start: 2025-07-05 | End: 2025-07-06

## 2025-07-05 RX ADMIN — HYDRALAZINE HYDROCHLORIDE 25 MG: 50 TABLET ORAL at 11:31

## 2025-07-05 RX ADMIN — SODIUM ZIRCONIUM CYCLOSILICATE 5 G: 5 POWDER, FOR SUSPENSION ORAL at 08:52

## 2025-07-05 RX ADMIN — INSULIN GLARGINE 30 UNITS: 100 INJECTION, SOLUTION SUBCUTANEOUS at 20:59

## 2025-07-05 RX ADMIN — HYDRALAZINE HYDROCHLORIDE 25 MG: 50 TABLET ORAL at 00:34

## 2025-07-05 RX ADMIN — SODIUM BICARBONATE 650 MG: 650 TABLET ORAL at 20:59

## 2025-07-05 RX ADMIN — CARVEDILOL 6.25 MG: 6.25 TABLET, FILM COATED ORAL at 17:01

## 2025-07-05 RX ADMIN — HYDRALAZINE HYDROCHLORIDE 25 MG: 50 TABLET ORAL at 17:00

## 2025-07-05 RX ADMIN — SODIUM CHLORIDE, PRESERVATIVE FREE 10 ML: 5 INJECTION INTRAVENOUS at 20:59

## 2025-07-05 RX ADMIN — Medication 100 MG: at 08:53

## 2025-07-05 RX ADMIN — HEPARIN SODIUM 5000 UNITS: 5000 INJECTION INTRAVENOUS; SUBCUTANEOUS at 20:59

## 2025-07-05 RX ADMIN — SODIUM BICARBONATE 650 MG: 650 TABLET ORAL at 16:00

## 2025-07-05 RX ADMIN — ATORVASTATIN CALCIUM 10 MG: 10 TABLET, FILM COATED ORAL at 08:53

## 2025-07-05 RX ADMIN — EMPAGLIFLOZIN 10 MG: 10 TABLET, FILM COATED ORAL at 17:01

## 2025-07-05 RX ADMIN — HEPARIN SODIUM 5000 UNITS: 5000 INJECTION INTRAVENOUS; SUBCUTANEOUS at 05:24

## 2025-07-05 RX ADMIN — ASPIRIN 81 MG: 81 TABLET, CHEWABLE ORAL at 17:01

## 2025-07-05 RX ADMIN — Medication 10 MG: at 08:53

## 2025-07-05 RX ADMIN — CARVEDILOL 3.12 MG: 3.12 TABLET, FILM COATED ORAL at 08:53

## 2025-07-05 RX ADMIN — AMLODIPINE BESYLATE 10 MG: 10 TABLET ORAL at 11:31

## 2025-07-05 RX ADMIN — Medication 10 MG: at 20:59

## 2025-07-05 RX ADMIN — HYDRALAZINE HYDROCHLORIDE 25 MG: 50 TABLET ORAL at 05:24

## 2025-07-05 RX ADMIN — SODIUM BICARBONATE 1300 MG: 650 TABLET ORAL at 08:53

## 2025-07-06 ENCOUNTER — APPOINTMENT (OUTPATIENT)
Dept: MRI IMAGING | Age: 75
DRG: 981 | End: 2025-07-06
Attending: HOSPITALIST
Payer: MEDICARE

## 2025-07-06 PROBLEM — I67.2 INTRACRANIAL ATHEROSCLEROSIS: Status: ACTIVE | Noted: 2025-07-06

## 2025-07-06 PROBLEM — I63.531: Status: ACTIVE | Noted: 2025-07-06

## 2025-07-06 LAB
25(OH)D3 SERPL-MCNC: 32.7 NG/ML (ref 30–100)
ANION GAP SERPL CALCULATED.3IONS-SCNC: 11 MMOL/L (ref 9–16)
BASOPHILS # BLD: 0.03 K/UL (ref 0–0.2)
BASOPHILS NFR BLD: 1 % (ref 0–2)
BUN SERPL-MCNC: 35 MG/DL (ref 8–23)
CALCIUM SERPL-MCNC: 9.5 MG/DL (ref 8.6–10.4)
CHLORIDE SERPL-SCNC: 108 MMOL/L (ref 98–107)
CO2 SERPL-SCNC: 25 MMOL/L (ref 20–31)
CREAT SERPL-MCNC: 2 MG/DL (ref 0.7–1.2)
EOSINOPHIL # BLD: 0.22 K/UL (ref 0–0.44)
EOSINOPHILS RELATIVE PERCENT: 4 % (ref 1–4)
ERYTHROCYTE [DISTWIDTH] IN BLOOD BY AUTOMATED COUNT: 15.1 % (ref 11.8–14.4)
FOLATE SERPL-MCNC: 15.3 NG/ML (ref 4.8–24.2)
GFR, ESTIMATED: 34 ML/MIN/1.73M2
GLUCOSE BLD-MCNC: 122 MG/DL (ref 75–110)
GLUCOSE BLD-MCNC: 141 MG/DL (ref 75–110)
GLUCOSE BLD-MCNC: 202 MG/DL (ref 75–110)
GLUCOSE SERPL-MCNC: 125 MG/DL (ref 74–99)
HCT VFR BLD AUTO: 34 % (ref 40.7–50.3)
HGB BLD-MCNC: 11.3 G/DL (ref 13–17)
IMM GRANULOCYTES # BLD AUTO: 0.03 K/UL (ref 0–0.3)
IMM GRANULOCYTES NFR BLD: 1 %
LYMPHOCYTES NFR BLD: 2.08 K/UL (ref 1.1–3.7)
LYMPHOCYTES RELATIVE PERCENT: 34 % (ref 24–43)
MCH RBC QN AUTO: 35 PG (ref 25.2–33.5)
MCHC RBC AUTO-ENTMCNC: 33.2 G/DL (ref 28.4–34.8)
MCV RBC AUTO: 105.3 FL (ref 82.6–102.9)
MONOCYTES NFR BLD: 0.56 K/UL (ref 0.1–1.2)
MONOCYTES NFR BLD: 9 % (ref 3–12)
NEUTROPHILS NFR BLD: 51 % (ref 36–65)
NEUTS SEG NFR BLD: 3.21 K/UL (ref 1.5–8.1)
NRBC BLD-RTO: 0 PER 100 WBC
PLATELET # BLD AUTO: 148 K/UL (ref 138–453)
PMV BLD AUTO: 9.5 FL (ref 8.1–13.5)
POTASSIUM SERPL-SCNC: 5 MMOL/L (ref 3.7–5.3)
RBC # BLD AUTO: 3.23 M/UL (ref 4.21–5.77)
RBC # BLD: ABNORMAL 10*6/UL
RBC # BLD: ABNORMAL 10*6/UL
SODIUM SERPL-SCNC: 144 MMOL/L (ref 136–145)
VIT B12 SERPL-MCNC: 911 PG/ML (ref 232–1245)
WBC OTHER # BLD: 6.1 K/UL (ref 3.5–11.3)

## 2025-07-06 PROCEDURE — 2060000000 HC ICU INTERMEDIATE R&B

## 2025-07-06 PROCEDURE — 70544 MR ANGIOGRAPHY HEAD W/O DYE: CPT

## 2025-07-06 PROCEDURE — 6370000000 HC RX 637 (ALT 250 FOR IP): Performed by: INTERNAL MEDICINE

## 2025-07-06 PROCEDURE — 6370000000 HC RX 637 (ALT 250 FOR IP)

## 2025-07-06 PROCEDURE — 6370000000 HC RX 637 (ALT 250 FOR IP): Performed by: PSYCHIATRY & NEUROLOGY

## 2025-07-06 PROCEDURE — 2500000003 HC RX 250 WO HCPCS

## 2025-07-06 PROCEDURE — 70547 MR ANGIOGRAPHY NECK W/O DYE: CPT

## 2025-07-06 PROCEDURE — 80048 BASIC METABOLIC PNL TOTAL CA: CPT

## 2025-07-06 PROCEDURE — 6360000002 HC RX W HCPCS

## 2025-07-06 PROCEDURE — 82947 ASSAY GLUCOSE BLOOD QUANT: CPT

## 2025-07-06 PROCEDURE — 36415 COLL VENOUS BLD VENIPUNCTURE: CPT

## 2025-07-06 PROCEDURE — 99223 1ST HOSP IP/OBS HIGH 75: CPT | Performed by: SURGERY

## 2025-07-06 PROCEDURE — 85025 COMPLETE CBC W/AUTO DIFF WBC: CPT

## 2025-07-06 PROCEDURE — 99223 1ST HOSP IP/OBS HIGH 75: CPT | Performed by: PSYCHIATRY & NEUROLOGY

## 2025-07-06 PROCEDURE — 99232 SBSQ HOSP IP/OBS MODERATE 35: CPT | Performed by: STUDENT IN AN ORGANIZED HEALTH CARE EDUCATION/TRAINING PROGRAM

## 2025-07-06 RX ORDER — INSULIN LISPRO 100 [IU]/ML
0-4 INJECTION, SOLUTION INTRAVENOUS; SUBCUTANEOUS
Status: DISCONTINUED | OUTPATIENT
Start: 2025-07-06 | End: 2025-07-08 | Stop reason: HOSPADM

## 2025-07-06 RX ORDER — CARVEDILOL 12.5 MG/1
12.5 TABLET ORAL 2 TIMES DAILY WITH MEALS
Status: DISCONTINUED | OUTPATIENT
Start: 2025-07-07 | End: 2025-07-08 | Stop reason: HOSPADM

## 2025-07-06 RX ORDER — CLOPIDOGREL BISULFATE 75 MG/1
75 TABLET ORAL DAILY
Status: DISCONTINUED | OUTPATIENT
Start: 2025-07-06 | End: 2025-07-08 | Stop reason: HOSPADM

## 2025-07-06 RX ADMIN — SODIUM BICARBONATE 650 MG: 650 TABLET ORAL at 12:51

## 2025-07-06 RX ADMIN — HYDRALAZINE HYDROCHLORIDE 25 MG: 50 TABLET ORAL at 00:06

## 2025-07-06 RX ADMIN — Medication 100 MG: at 07:39

## 2025-07-06 RX ADMIN — HYDRALAZINE HYDROCHLORIDE 25 MG: 50 TABLET ORAL at 12:51

## 2025-07-06 RX ADMIN — HYDRALAZINE HYDROCHLORIDE 25 MG: 50 TABLET ORAL at 16:46

## 2025-07-06 RX ADMIN — HYDRALAZINE HYDROCHLORIDE 25 MG: 50 TABLET ORAL at 23:53

## 2025-07-06 RX ADMIN — HEPARIN SODIUM 5000 UNITS: 5000 INJECTION INTRAVENOUS; SUBCUTANEOUS at 23:53

## 2025-07-06 RX ADMIN — CLOPIDOGREL BISULFATE 75 MG: 75 TABLET, FILM COATED ORAL at 12:53

## 2025-07-06 RX ADMIN — SODIUM ZIRCONIUM CYCLOSILICATE 5 G: 5 POWDER, FOR SUSPENSION ORAL at 07:39

## 2025-07-06 RX ADMIN — HEPARIN SODIUM 5000 UNITS: 5000 INJECTION INTRAVENOUS; SUBCUTANEOUS at 12:51

## 2025-07-06 RX ADMIN — ATORVASTATIN CALCIUM 10 MG: 10 TABLET, FILM COATED ORAL at 07:39

## 2025-07-06 RX ADMIN — CARVEDILOL 6.25 MG: 6.25 TABLET, FILM COATED ORAL at 07:39

## 2025-07-06 RX ADMIN — CARVEDILOL 6.25 MG: 6.25 TABLET, FILM COATED ORAL at 16:47

## 2025-07-06 RX ADMIN — SODIUM BICARBONATE 650 MG: 650 TABLET ORAL at 23:54

## 2025-07-06 RX ADMIN — AMLODIPINE BESYLATE 10 MG: 10 TABLET ORAL at 07:39

## 2025-07-06 RX ADMIN — SODIUM CHLORIDE, PRESERVATIVE FREE 10 ML: 5 INJECTION INTRAVENOUS at 23:54

## 2025-07-06 RX ADMIN — SODIUM CHLORIDE, PRESERVATIVE FREE 10 ML: 5 INJECTION INTRAVENOUS at 07:39

## 2025-07-06 RX ADMIN — EMPAGLIFLOZIN 10 MG: 10 TABLET, FILM COATED ORAL at 07:39

## 2025-07-06 RX ADMIN — HEPARIN SODIUM 5000 UNITS: 5000 INJECTION INTRAVENOUS; SUBCUTANEOUS at 05:42

## 2025-07-06 RX ADMIN — HYDRALAZINE HYDROCHLORIDE 25 MG: 50 TABLET ORAL at 05:41

## 2025-07-06 RX ADMIN — SODIUM BICARBONATE 650 MG: 650 TABLET ORAL at 07:39

## 2025-07-06 RX ADMIN — ASPIRIN 81 MG: 81 TABLET, CHEWABLE ORAL at 07:39

## 2025-07-06 NOTE — CONSULTS
Renal Consult Note    Patient :  Eber Bolden; 74 y.o. MRN# [id number]  Location:  0318/0318-01  Attending:  Harper Moses MD  Admit Date:  7/3/2025   Hospital Day: 1    Reason for Consult:     Asked by Harper Espino MD to see for JOESPH/Elevated Creatinine.    History Obtained From:     Patient/wife/chart    History of Present Illness:     Eber Bolden; 74 y.o. male with past medical history as mentioned below.  Known history of type 2 diabetes, obesity, history of lower nephrectomy in 1983, gave right kidney to his brother.  He also history of hypertension although recently blood pressures have been well-controlled.  Patient also tells me he has got history of recurrent UTIs.  His baseline creatinine has been in the 1.8-2.0 range GFR 30-35 mL/min.  He does not follow-up with nephrology.  According to him and his wife about 2 weeks ago patient while going to get his car pulled up with gas lost his way and became disoriented.  This was unusual for him.  He then saw his primary care physician who diagnosed him with a UTI.  He was placed on Bactrim DS 1 tablet twice a day for a week.  He finished his antibiotics about 4 to 5 days ago.  His symptoms improved for a while and then he seemed to be getting confused and disoriented again.  He saw his primary care physician and got lab work done which showed a creatinine of 3.2 and a potassium of 6.4.  He was advised admission.  I also see that in May he had a similar episode where his potassium went up to 7.5.  At that time he was on ACE inhibitors which were discontinued.  Patient has chronically had nongap metabolic acidosis as well.  Almost all of his potassiums have been higher than 5 going back to 2023.  His wife tells me that he is fond of fruit juices, tomatoes, potatoes.  At the time evaluation he denies any specific complaints.  No shortness of breath, PND, orthopnea.  No cough or unremarkable.  No fever or chills.  Urine output has been good creatinine has been 
Needle 31G X 5 MM MISC     Provider, MD Abdelrahman   Handicap Placard MISC by Does not apply route 11/15/24   Becca Bradford MD   Continuous Glucose  (DEXCOM G7 ) KILO 1 each by Does not apply route daily    Abdelrahman Mcghee MD   Continuous Glucose Sensor (DEXCOM G7 SENSOR) MISC 1 each by Does not apply route daily    Abdelrahman Mcghee MD       Allergies:  Patient has no known allergies.    Social History:   reports that he quit smoking about 41 years ago. His smoking use included cigarettes. He started smoking about 61 years ago. He has a 60 pack-year smoking history. He has never used smokeless tobacco. He reports that he does not drink alcohol and does not use drugs.     Family History: family history includes Asthma in his brother; Cancer in his sister; Diabetes in his brother and brother; Heart Disease in his maternal grandmother; High Blood Pressure in his mother; Kidney Disease in his brother; Other in his father; Stroke in his mother. No h/o sudden cardiac death.No for premature CAD    REVIEW OF SYSTEMS:    Constitutional: there has been no unanticipated weight loss. There's been No change in energy level, No change in activity level.     Eyes: No visual changes or diplopia. No scleral icterus.  ENT: No Headaches, hearing loss or vertigo. No mouth sores or sore throat.  Cardiovascular: see above  Respiratory: see above  Gastrointestinal: No abdominal pain, appetite loss, blood in stools.   Genitourinary: No dysuria, trouble voiding, or hematuria.  Integumentary: No rash or pruritis.  Neurological: No headache or diplopia. No tingling  Psychiatric: No anxiety, or depression.  Endocrine: No temperature intolerance.   Hematologic/Lymphatic: No abnormal bruising or bleeding, blood clots or swollen lymph nodes.  Allergic/Immunologic: No nasal congestion or hives.      PHYSICAL EXAM:      /64   Pulse 76   Temp 97.5 °F (36.4 °C) (Oral)   Resp 17   Ht 1.753 m (5' 9\")   Wt 106 kg 
Eber Bolden is a 74 y.o. male with multiple vascular risk factors found to have small subacute right PCA territory infarcts (splenium of corpus callosum, parietal, and occipital lobes) in the setting of subacute cognitive and behavioral changes. Etiology likely small vessel disease vs less likely cardioembolic. Decreased sensation in bilateral feet in all modalities c/f diabetic polyneuropathy, will rule out secondary causes.    Subacute R PCA infarcts  Continue aspirin 81 mg daily  D/t CKD will obtain MRA head and neck  A1c 7.1 during this admission  Most recent LDL of 90, 7 mo ago, will recheck lipid panel  Obtain TTE with bubble study  Consider cardio consult for SAWYER/loop if no ICAD on imaging    Peripheral polyneuropathy, likely diabetic  R/o secondary causes  Check Vitamin B1, B12, D, and E  Outpatient EMG        Follow-up further recommendations after discussing the case with attending      Glenn Malone MD  PGY-3 Neurology Resident  7/5/2025  5:38 PM    Copy sent to Dr. Lancaster, Jose De Jesus SANABRIA PA-C    This note is created with the assistance of a speech-recognition program. While intending to generate a document that actually reflects the content of the visit, the document can still have some errors including those of syntax and sound a- like substitutions which may escape proofreading. In such instances, actual meaning can be extrapolated by contextual derivation.

## 2025-07-07 ENCOUNTER — APPOINTMENT (OUTPATIENT)
Age: 75
DRG: 981 | End: 2025-07-07
Attending: HOSPITALIST
Payer: MEDICARE

## 2025-07-07 PROBLEM — I63.9 CEREBROVASCULAR ACCIDENT (CVA) (HCC): Status: ACTIVE | Noted: 2025-07-07

## 2025-07-07 LAB
ANION GAP SERPL CALCULATED.3IONS-SCNC: 11 MMOL/L (ref 9–16)
BUN SERPL-MCNC: 37 MG/DL (ref 8–23)
CALCIUM SERPL-MCNC: 9.2 MG/DL (ref 8.6–10.4)
CHLORIDE SERPL-SCNC: 108 MMOL/L (ref 98–107)
CO2 SERPL-SCNC: 21 MMOL/L (ref 20–31)
CREAT SERPL-MCNC: 2 MG/DL (ref 0.7–1.2)
ECHO BSA: 2.28 M2
ECHO BSA: 2.28 M2
GFR, ESTIMATED: 34 ML/MIN/1.73M2
GLUCOSE BLD-MCNC: 133 MG/DL (ref 75–110)
GLUCOSE BLD-MCNC: 153 MG/DL (ref 75–110)
GLUCOSE BLD-MCNC: 164 MG/DL (ref 75–110)
GLUCOSE BLD-MCNC: 190 MG/DL (ref 75–110)
GLUCOSE BLD-MCNC: 298 MG/DL (ref 75–110)
GLUCOSE SERPL-MCNC: 174 MG/DL (ref 74–99)
POTASSIUM SERPL-SCNC: 4.4 MMOL/L (ref 3.7–5.3)
SODIUM SERPL-SCNC: 140 MMOL/L (ref 136–145)

## 2025-07-07 PROCEDURE — B246ZZ4 ULTRASONOGRAPHY OF RIGHT AND LEFT HEART, TRANSESOPHAGEAL: ICD-10-PCS | Performed by: STUDENT IN AN ORGANIZED HEALTH CARE EDUCATION/TRAINING PROGRAM

## 2025-07-07 PROCEDURE — 6360000002 HC RX W HCPCS: Performed by: INTERNAL MEDICINE

## 2025-07-07 PROCEDURE — 6370000000 HC RX 637 (ALT 250 FOR IP): Performed by: STUDENT IN AN ORGANIZED HEALTH CARE EDUCATION/TRAINING PROGRAM

## 2025-07-07 PROCEDURE — 2500000003 HC RX 250 WO HCPCS

## 2025-07-07 PROCEDURE — 2709999900 HC NON-CHARGEABLE SUPPLY: Performed by: STUDENT IN AN ORGANIZED HEALTH CARE EDUCATION/TRAINING PROGRAM

## 2025-07-07 PROCEDURE — 2060000000 HC ICU INTERMEDIATE R&B

## 2025-07-07 PROCEDURE — 33285 INSJ SUBQ CAR RHYTHM MNTR: CPT | Performed by: STUDENT IN AN ORGANIZED HEALTH CARE EDUCATION/TRAINING PROGRAM

## 2025-07-07 PROCEDURE — 80048 BASIC METABOLIC PNL TOTAL CA: CPT

## 2025-07-07 PROCEDURE — 0JH632Z INSERTION OF MONITORING DEVICE INTO CHEST SUBCUTANEOUS TISSUE AND FASCIA, PERCUTANEOUS APPROACH: ICD-10-PCS | Performed by: STUDENT IN AN ORGANIZED HEALTH CARE EDUCATION/TRAINING PROGRAM

## 2025-07-07 PROCEDURE — 6370000000 HC RX 637 (ALT 250 FOR IP)

## 2025-07-07 PROCEDURE — 97166 OT EVAL MOD COMPLEX 45 MIN: CPT

## 2025-07-07 PROCEDURE — 97530 THERAPEUTIC ACTIVITIES: CPT

## 2025-07-07 PROCEDURE — 36415 COLL VENOUS BLD VENIPUNCTURE: CPT

## 2025-07-07 PROCEDURE — 97535 SELF CARE MNGMENT TRAINING: CPT

## 2025-07-07 PROCEDURE — 93325 DOPPLER ECHO COLOR FLOW MAPG: CPT

## 2025-07-07 PROCEDURE — APPSS30 APP SPLIT SHARED TIME 16-30 MINUTES: Performed by: NURSE PRACTITIONER

## 2025-07-07 PROCEDURE — C1892 INTRO/SHEATH,FIXED,PEEL-AWAY: HCPCS | Performed by: STUDENT IN AN ORGANIZED HEALTH CARE EDUCATION/TRAINING PROGRAM

## 2025-07-07 PROCEDURE — 2580000003 HC RX 258: Performed by: INTERNAL MEDICINE

## 2025-07-07 PROCEDURE — 99232 SBSQ HOSP IP/OBS MODERATE 35: CPT | Performed by: STUDENT IN AN ORGANIZED HEALTH CARE EDUCATION/TRAINING PROGRAM

## 2025-07-07 PROCEDURE — C1764 EVENT RECORDER, CARDIAC: HCPCS | Performed by: STUDENT IN AN ORGANIZED HEALTH CARE EDUCATION/TRAINING PROGRAM

## 2025-07-07 PROCEDURE — 6370000000 HC RX 637 (ALT 250 FOR IP): Performed by: INTERNAL MEDICINE

## 2025-07-07 PROCEDURE — 99233 SBSQ HOSP IP/OBS HIGH 50: CPT | Performed by: PSYCHIATRY & NEUROLOGY

## 2025-07-07 PROCEDURE — 93325 DOPPLER ECHO COLOR FLOW MAPG: CPT | Performed by: STUDENT IN AN ORGANIZED HEALTH CARE EDUCATION/TRAINING PROGRAM

## 2025-07-07 PROCEDURE — 93312 ECHO TRANSESOPHAGEAL: CPT | Performed by: STUDENT IN AN ORGANIZED HEALTH CARE EDUCATION/TRAINING PROGRAM

## 2025-07-07 PROCEDURE — 82947 ASSAY GLUCOSE BLOOD QUANT: CPT

## 2025-07-07 PROCEDURE — 6360000002 HC RX W HCPCS

## 2025-07-07 DEVICE — ICM LNQ22 LINQ II PRIME US
Type: IMPLANTABLE DEVICE | Site: CHEST | Status: FUNCTIONAL
Brand: LINQ II™

## 2025-07-07 RX ORDER — LORAZEPAM 0.5 MG/1
0.5 TABLET ORAL ONCE
Status: DISCONTINUED | OUTPATIENT
Start: 2025-07-07 | End: 2025-07-08 | Stop reason: HOSPADM

## 2025-07-07 RX ORDER — LIDOCAINE HYDROCHLORIDE 20 MG/ML
SOLUTION OROPHARYNGEAL PRN
Status: DISCONTINUED | OUTPATIENT
Start: 2025-07-07 | End: 2025-07-07 | Stop reason: HOSPADM

## 2025-07-07 RX ORDER — MIDAZOLAM HYDROCHLORIDE 1 MG/ML
INJECTION, SOLUTION INTRAMUSCULAR; INTRAVENOUS PRN
Status: DISCONTINUED | OUTPATIENT
Start: 2025-07-07 | End: 2025-07-07 | Stop reason: HOSPADM

## 2025-07-07 RX ORDER — SODIUM CHLORIDE 9 MG/ML
INJECTION, SOLUTION INTRAVENOUS CONTINUOUS PRN
Status: DISCONTINUED | OUTPATIENT
Start: 2025-07-07 | End: 2025-07-07 | Stop reason: HOSPADM

## 2025-07-07 RX ORDER — LIDOCAINE HYDROCHLORIDE AND EPINEPHRINE 10; 10 MG/ML; UG/ML
INJECTION, SOLUTION INFILTRATION; PERINEURAL PRN
Status: DISCONTINUED | OUTPATIENT
Start: 2025-07-07 | End: 2025-07-07 | Stop reason: HOSPADM

## 2025-07-07 RX ORDER — FENTANYL CITRATE 50 UG/ML
INJECTION, SOLUTION INTRAMUSCULAR; INTRAVENOUS PRN
Status: DISCONTINUED | OUTPATIENT
Start: 2025-07-07 | End: 2025-07-07 | Stop reason: HOSPADM

## 2025-07-07 RX ADMIN — HYDRALAZINE HYDROCHLORIDE 25 MG: 50 TABLET ORAL at 17:34

## 2025-07-07 RX ADMIN — INSULIN LISPRO 2 UNITS: 100 INJECTION, SOLUTION INTRAVENOUS; SUBCUTANEOUS at 17:34

## 2025-07-07 RX ADMIN — EMPAGLIFLOZIN 10 MG: 10 TABLET, FILM COATED ORAL at 10:12

## 2025-07-07 RX ADMIN — INSULIN GLARGINE 30 UNITS: 100 INJECTION, SOLUTION SUBCUTANEOUS at 21:15

## 2025-07-07 RX ADMIN — SODIUM BICARBONATE 650 MG: 650 TABLET ORAL at 10:12

## 2025-07-07 RX ADMIN — SODIUM BICARBONATE 650 MG: 650 TABLET ORAL at 14:36

## 2025-07-07 RX ADMIN — HEPARIN SODIUM 5000 UNITS: 5000 INJECTION INTRAVENOUS; SUBCUTANEOUS at 05:26

## 2025-07-07 RX ADMIN — CARVEDILOL 12.5 MG: 12.5 TABLET, FILM COATED ORAL at 10:12

## 2025-07-07 RX ADMIN — HEPARIN SODIUM 5000 UNITS: 5000 INJECTION INTRAVENOUS; SUBCUTANEOUS at 21:15

## 2025-07-07 RX ADMIN — ASPIRIN 81 MG: 81 TABLET, CHEWABLE ORAL at 10:12

## 2025-07-07 RX ADMIN — SODIUM CHLORIDE, PRESERVATIVE FREE 10 ML: 5 INJECTION INTRAVENOUS at 21:15

## 2025-07-07 RX ADMIN — HYDRALAZINE HYDROCHLORIDE 25 MG: 50 TABLET ORAL at 05:26

## 2025-07-07 RX ADMIN — ATORVASTATIN CALCIUM 10 MG: 10 TABLET, FILM COATED ORAL at 10:12

## 2025-07-07 RX ADMIN — CARVEDILOL 12.5 MG: 12.5 TABLET, FILM COATED ORAL at 17:35

## 2025-07-07 RX ADMIN — AMLODIPINE BESYLATE 10 MG: 10 TABLET ORAL at 10:12

## 2025-07-07 RX ADMIN — SODIUM BICARBONATE 650 MG: 650 TABLET ORAL at 21:15

## 2025-07-07 RX ADMIN — SODIUM ZIRCONIUM CYCLOSILICATE 5 G: 5 POWDER, FOR SUSPENSION ORAL at 10:12

## 2025-07-07 RX ADMIN — HYDRALAZINE HYDROCHLORIDE 25 MG: 50 TABLET ORAL at 13:39

## 2025-07-07 RX ADMIN — Medication 100 MG: at 10:12

## 2025-07-07 ASSESSMENT — PULMONARY FUNCTION TESTS
PIF_VALUE: 0
PIF_VALUE: 1
PIF_VALUE: 0

## 2025-07-07 NOTE — PROCEDURES
[facility]  Transesophageal Echocardiogram       Today's Date:  7/7/2025  Indication:   Stroke     Operators:  Primary: MD Valerie     Pre Procedure Conscious Sedation Data:  ASA Class:    [] I [x] II [] III [] IV    Mallampati Class:  [] I [x] II [] III [] IV    Procedure:  Patient seen and examined. History and Physical reviewed. Labs reviewed.    After informed consent was obtained with explanation of the risks and benefits, the patient was brought to cardiac cath lab. All sedation was administered by the cardiologist. The oropharynx was pre-anesthesized with viscous lidocaine and cetacaine spray. The ultrasound probe was passed without any difficulty.    SAWYER findings:  LA:  Normal, mild smoke present   MAGDY:  No thrombus  RA:  Normal  RV:   Normal  LV:  Normal  Estimated LVEF:  55 - 60 %  Aorta:   Mild atheromatous disease arch  Pericardium: No pericardial effusion  Septum:  No intracardiac shunt via color Doppler. No intracardiac shunt via injection of agitated saline.    Valves:  Mitral Valve: Structurally normal. No regurgitation is identified.  Aortic Valve: The aortic valve is trileaflet and opens adequately. No regurgitiation is identified.  Tricuspid valve: Structurally normal. No regurgitation is identified.  Pulmonary valve: Normal. Mild significant regurgitation    No valvular vegetations or thrombus identified.    Summary:   1. A SAWYER was performed without complications.  2. LVEF 55 - 60%  3. No thrombus or valvular vegetation identified  4. Mild PI  5. Mild smoke present in the LA        [facility]      Procedure Note  LOOP RECORDER IMPLANT         Model # Serial #   Recorder [id number]     Sedation monitoring  Loop recorder Implant    Indication: CVA    Procedure:  After the usual preparation of the left neck and chest, the patient was draped in the usual sterile manner.  Local anesthesia was infused below the left clavicle from the midline laterally. An

## 2025-07-08 VITALS
WEIGHT: 233.69 LBS | SYSTOLIC BLOOD PRESSURE: 134 MMHG | OXYGEN SATURATION: 96 % | BODY MASS INDEX: 34.61 KG/M2 | HEART RATE: 74 BPM | RESPIRATION RATE: 17 BRPM | HEIGHT: 69 IN | TEMPERATURE: 98 F | DIASTOLIC BLOOD PRESSURE: 52 MMHG

## 2025-07-08 LAB
ALBUMIN PERCENT: 59 % (ref 56–66)
ALBUMIN SERPL-MCNC: 3.8 G/DL (ref 3.2–5.2)
ALPHA 2 PERCENT: 11 % (ref 7–12)
ALPHA1 GLOB SERPL ELPH-MCNC: 0.3 G/DL (ref 0.1–0.4)
ALPHA1 GLOB SERPL ELPH-MCNC: 4 % (ref 3–5)
ALPHA2 GLOB SERPL ELPH-MCNC: 0.7 G/DL (ref 0.5–0.9)
ANA SER QL IA: NEGATIVE
ANION GAP SERPL CALCULATED.3IONS-SCNC: 11 MMOL/L (ref 9–16)
B-GLOBULIN SERPL ELPH-MCNC: 0.7 G/DL (ref 0.7–1.4)
B-GLOBULIN SERPL ELPH-MCNC: 11 % (ref 8–13)
BUN SERPL-MCNC: 43 MG/DL (ref 8–23)
CALCIUM SERPL-MCNC: 9.3 MG/DL (ref 8.6–10.4)
CHLORIDE SERPL-SCNC: 107 MMOL/L (ref 98–107)
CO2 SERPL-SCNC: 22 MMOL/L (ref 20–31)
CREAT SERPL-MCNC: 2 MG/DL (ref 0.7–1.2)
DSDNA IGG SER QL IA: <0.5 IU/ML
GAMMA GLOB SERPL ELPH-MCNC: 1 G/DL (ref 0.5–1.5)
GAMMA GLOBULIN %: 15 % (ref 11–19)
GFR, ESTIMATED: 34 ML/MIN/1.73M2
GLUCOSE BLD-MCNC: 136 MG/DL (ref 75–110)
GLUCOSE BLD-MCNC: 238 MG/DL (ref 75–110)
GLUCOSE SERPL-MCNC: 145 MG/DL (ref 74–99)
NUCLEAR IGG SER IA-RTO: 0.2 U/ML
PATHOLOGIST: ABNORMAL
POTASSIUM SERPL-SCNC: 4.5 MMOL/L (ref 3.7–5.3)
PROT PATTERN SERPL ELPH-IMP: ABNORMAL
PROT SERPL-MCNC: 6.5 G/DL (ref 6.6–8.7)
SODIUM SERPL-SCNC: 140 MMOL/L (ref 136–145)
TOTAL PROT. SUM,%: 100 % (ref 98–102)
TOTAL PROT. SUM: 6.5 G/DL (ref 6.3–8.2)
VIT B1 PYROPHOSHATE BLD-SCNC: 207 NMOL/L (ref 70–180)

## 2025-07-08 PROCEDURE — 36415 COLL VENOUS BLD VENIPUNCTURE: CPT

## 2025-07-08 PROCEDURE — 82947 ASSAY GLUCOSE BLOOD QUANT: CPT

## 2025-07-08 PROCEDURE — 6360000002 HC RX W HCPCS

## 2025-07-08 PROCEDURE — 6370000000 HC RX 637 (ALT 250 FOR IP): Performed by: INTERNAL MEDICINE

## 2025-07-08 PROCEDURE — 99233 SBSQ HOSP IP/OBS HIGH 50: CPT | Performed by: NURSE PRACTITIONER

## 2025-07-08 PROCEDURE — 2500000003 HC RX 250 WO HCPCS

## 2025-07-08 PROCEDURE — 6370000000 HC RX 637 (ALT 250 FOR IP)

## 2025-07-08 PROCEDURE — 6370000000 HC RX 637 (ALT 250 FOR IP): Performed by: STUDENT IN AN ORGANIZED HEALTH CARE EDUCATION/TRAINING PROGRAM

## 2025-07-08 PROCEDURE — 99239 HOSP IP/OBS DSCHRG MGMT >30: CPT | Performed by: STUDENT IN AN ORGANIZED HEALTH CARE EDUCATION/TRAINING PROGRAM

## 2025-07-08 PROCEDURE — 6370000000 HC RX 637 (ALT 250 FOR IP): Performed by: PSYCHIATRY & NEUROLOGY

## 2025-07-08 PROCEDURE — 80048 BASIC METABOLIC PNL TOTAL CA: CPT

## 2025-07-08 RX ORDER — INSULIN GLARGINE 100 [IU]/ML
30 INJECTION, SOLUTION SUBCUTANEOUS NIGHTLY
Qty: 10 ML | Refills: 3 | Status: SHIPPED | OUTPATIENT
Start: 2025-07-08 | End: 2025-07-08

## 2025-07-08 RX ORDER — ATORVASTATIN CALCIUM 20 MG/1
20 TABLET, FILM COATED ORAL DAILY
Qty: 30 TABLET | Refills: 0 | Status: SHIPPED | OUTPATIENT
Start: 2025-07-08 | End: 2025-08-07

## 2025-07-08 RX ORDER — ASPIRIN 81 MG/1
81 TABLET, CHEWABLE ORAL DAILY
Qty: 30 TABLET | Refills: 3 | Status: SHIPPED | OUTPATIENT
Start: 2025-07-09

## 2025-07-08 RX ORDER — AMLODIPINE BESYLATE 10 MG/1
10 TABLET ORAL DAILY
Qty: 30 TABLET | Refills: 3 | Status: SHIPPED | OUTPATIENT
Start: 2025-07-09

## 2025-07-08 RX ORDER — ATORVASTATIN CALCIUM 10 MG/1
20 TABLET, FILM COATED ORAL DAILY
Qty: 90 TABLET | Refills: 1 | Status: SHIPPED | OUTPATIENT
Start: 2025-07-08 | End: 2025-07-08 | Stop reason: HOSPADM

## 2025-07-08 RX ORDER — AMLODIPINE BESYLATE 10 MG/1
10 TABLET ORAL DAILY
Qty: 30 TABLET | Refills: 3 | Status: SHIPPED | OUTPATIENT
Start: 2025-07-09 | End: 2025-07-08

## 2025-07-08 RX ORDER — CARVEDILOL 12.5 MG/1
12.5 TABLET ORAL 2 TIMES DAILY WITH MEALS
Qty: 60 TABLET | Refills: 3 | Status: SHIPPED | OUTPATIENT
Start: 2025-07-08 | End: 2025-07-08

## 2025-07-08 RX ORDER — INSULIN GLARGINE 100 [IU]/ML
30 INJECTION, SOLUTION SUBCUTANEOUS NIGHTLY
Qty: 5 ADJUSTABLE DOSE PRE-FILLED PEN SYRINGE | Refills: 3 | Status: SHIPPED | OUTPATIENT
Start: 2025-07-08

## 2025-07-08 RX ORDER — SODIUM BICARBONATE 650 MG/1
650 TABLET ORAL 3 TIMES DAILY
Qty: 45 TABLET | Refills: 0 | Status: SHIPPED | OUTPATIENT
Start: 2025-07-08 | End: 2025-07-11 | Stop reason: SDUPTHER

## 2025-07-08 RX ORDER — ALPRAZOLAM 0.5 MG
0.5 TABLET ORAL ONCE
Status: COMPLETED | OUTPATIENT
Start: 2025-07-08 | End: 2025-07-08

## 2025-07-08 RX ORDER — HYDRALAZINE HYDROCHLORIDE 25 MG/1
25 TABLET, FILM COATED ORAL 4 TIMES DAILY
Qty: 120 TABLET | Refills: 3 | Status: SHIPPED | OUTPATIENT
Start: 2025-07-08

## 2025-07-08 RX ORDER — CLOPIDOGREL BISULFATE 75 MG/1
75 TABLET ORAL DAILY
Qty: 30 TABLET | Refills: 3 | Status: SHIPPED | OUTPATIENT
Start: 2025-07-09 | End: 2025-07-08

## 2025-07-08 RX ORDER — BUSPIRONE HYDROCHLORIDE 5 MG/1
5 TABLET ORAL 2 TIMES DAILY
Status: DISCONTINUED | OUTPATIENT
Start: 2025-07-08 | End: 2025-07-08 | Stop reason: HOSPADM

## 2025-07-08 RX ORDER — INSULIN GLARGINE 100 [IU]/ML
30 INJECTION, SOLUTION SUBCUTANEOUS NIGHTLY
Qty: 10 ML | Refills: 3 | Status: SHIPPED | OUTPATIENT
Start: 2025-07-08 | End: 2025-07-08 | Stop reason: HOSPADM

## 2025-07-08 RX ORDER — BUSPIRONE HYDROCHLORIDE 5 MG/1
5 TABLET ORAL 2 TIMES DAILY
Qty: 30 TABLET | Refills: 0 | Status: SHIPPED | OUTPATIENT
Start: 2025-07-08 | End: 2025-07-11 | Stop reason: SDUPTHER

## 2025-07-08 RX ORDER — BUSPIRONE HYDROCHLORIDE 5 MG/1
5 TABLET ORAL 2 TIMES DAILY
Qty: 30 TABLET | Refills: 0 | Status: SHIPPED | OUTPATIENT
Start: 2025-07-08 | End: 2025-07-08

## 2025-07-08 RX ORDER — INSULIN LISPRO 100 [IU]/ML
INJECTION, SOLUTION INTRAVENOUS; SUBCUTANEOUS
Qty: 5 ADJUSTABLE DOSE PRE-FILLED PEN SYRINGE | Refills: 2 | Status: SHIPPED | OUTPATIENT
Start: 2025-07-08

## 2025-07-08 RX ORDER — CLOPIDOGREL BISULFATE 75 MG/1
75 TABLET ORAL DAILY
Qty: 30 TABLET | Refills: 3 | Status: SHIPPED | OUTPATIENT
Start: 2025-07-09

## 2025-07-08 RX ORDER — ASPIRIN 81 MG/1
81 TABLET, CHEWABLE ORAL DAILY
Qty: 30 TABLET | Refills: 3 | Status: SHIPPED | OUTPATIENT
Start: 2025-07-09 | End: 2025-07-08

## 2025-07-08 RX ORDER — SODIUM BICARBONATE 650 MG/1
650 TABLET ORAL 3 TIMES DAILY
Qty: 45 TABLET | Refills: 0 | Status: SHIPPED | OUTPATIENT
Start: 2025-07-08 | End: 2025-07-08

## 2025-07-08 RX ORDER — CARVEDILOL 12.5 MG/1
12.5 TABLET ORAL 2 TIMES DAILY WITH MEALS
Qty: 60 TABLET | Refills: 3 | Status: SHIPPED | OUTPATIENT
Start: 2025-07-08

## 2025-07-08 RX ADMIN — EMPAGLIFLOZIN 10 MG: 10 TABLET, FILM COATED ORAL at 08:50

## 2025-07-08 RX ADMIN — HEPARIN SODIUM 5000 UNITS: 5000 INJECTION INTRAVENOUS; SUBCUTANEOUS at 06:22

## 2025-07-08 RX ADMIN — HEPARIN SODIUM 5000 UNITS: 5000 INJECTION INTRAVENOUS; SUBCUTANEOUS at 13:58

## 2025-07-08 RX ADMIN — SODIUM ZIRCONIUM CYCLOSILICATE 5 G: 5 POWDER, FOR SUSPENSION ORAL at 08:50

## 2025-07-08 RX ADMIN — AMLODIPINE BESYLATE 10 MG: 10 TABLET ORAL at 08:50

## 2025-07-08 RX ADMIN — HYDRALAZINE HYDROCHLORIDE 25 MG: 50 TABLET ORAL at 12:11

## 2025-07-08 RX ADMIN — SODIUM BICARBONATE 650 MG: 650 TABLET ORAL at 13:58

## 2025-07-08 RX ADMIN — ALPRAZOLAM 0.5 MG: 0.5 TABLET ORAL at 15:52

## 2025-07-08 RX ADMIN — SODIUM CHLORIDE, PRESERVATIVE FREE 10 ML: 5 INJECTION INTRAVENOUS at 08:50

## 2025-07-08 RX ADMIN — HYDRALAZINE HYDROCHLORIDE 25 MG: 50 TABLET ORAL at 00:18

## 2025-07-08 RX ADMIN — ATORVASTATIN CALCIUM 10 MG: 10 TABLET, FILM COATED ORAL at 12:11

## 2025-07-08 RX ADMIN — HYDRALAZINE HYDROCHLORIDE 25 MG: 50 TABLET ORAL at 06:22

## 2025-07-08 RX ADMIN — SODIUM BICARBONATE 650 MG: 650 TABLET ORAL at 08:50

## 2025-07-08 RX ADMIN — CARVEDILOL 12.5 MG: 12.5 TABLET, FILM COATED ORAL at 08:50

## 2025-07-08 RX ADMIN — CARVEDILOL 12.5 MG: 12.5 TABLET, FILM COATED ORAL at 15:52

## 2025-07-08 RX ADMIN — ASPIRIN 81 MG: 81 TABLET, CHEWABLE ORAL at 08:50

## 2025-07-08 RX ADMIN — Medication 100 MG: at 08:50

## 2025-07-08 RX ADMIN — INSULIN LISPRO 1 UNITS: 100 INJECTION, SOLUTION INTRAVENOUS; SUBCUTANEOUS at 12:11

## 2025-07-08 RX ADMIN — CLOPIDOGREL BISULFATE 75 MG: 75 TABLET, FILM COATED ORAL at 08:50

## 2025-07-08 NOTE — DISCHARGE INSTR - COC
greater 30 days.     Update Admission H&P: No change in H&P    PHYSICIAN SIGNATURE:  Electronically signed by Clark Tomlinson Sra, MD on 7/8/25 at 3:25 PM EDT

## 2025-07-08 NOTE — PROGRESS NOTES
[facility]   Progress Note                   Date:   7/8/2025  Patient name: Eber Bolden  Date of admission:  7/3/2025  4:26 PM  MRN:   [id number]  YOB: 1950  PCP: Jose De Jesus Lancaster PAANSHU    Reason for Admission: JOESPH (acute kidney injury) [N17.9]  Hyperkalemia [E87.5]    Subjective:       Clinical Changes / Abnormalities: Pt seen and examined in the room.  Patient resting in bed. Pt denies any CP or sob. Patient reports that he slept well last night.  Labs, vitals and tele reviewed- SR 73  S/P SAWYER/ILR    Medications:   Scheduled Meds:   LORazepam  0.5 mg Oral Once    clopidogrel  75 mg Oral Daily    insulin lispro  0-4 Units SubCUTAneous 4x Daily AC & HS    carvedilol  12.5 mg Oral BID WC    amLODIPine  10 mg Oral Daily    sodium bicarbonate  650 mg Oral TID    aspirin  81 mg Oral Daily    empagliflozin  10 mg Oral Daily    insulin glargine  30 Units SubCUTAneous Nightly    hydrALAZINE  25 mg Oral 4 times per day    thiamine  100 mg Oral Daily    sodium chloride flush  5-40 mL IntraVENous 2 times per day    heparin (porcine)  5,000 Units SubCUTAneous 3 times per day    atorvastatin  10 mg Oral Daily    sodium zirconium cyclosilicate  5 g Oral Daily     Continuous Infusions:   sodium chloride      dextrose       CBC:   Recent Labs     07/06/25  0835   WBC 6.1   HGB 11.3*        BMP:    Recent Labs     07/06/25  0835 07/07/25  0312 07/08/25  0644    140 140   K 5.0 4.4 4.5   * 108* 107   CO2 25 21 22   BUN 35* 37* 43*   CREATININE 2.0* 2.0* 2.0*   GLUCOSE 125* 174* 145*     Hepatic: No results for input(s): \"AST\", \"ALT\", \"BILITOT\", \"ALKPHOS\" in the last 72 hours.    Invalid input(s): \"ALB\"  Troponin: No results for input(s): \"TROPHS\" in the last 72 hours.  BNP: No results for input(s): \"BNP\" in the last 72 hours.  Lipids: No results for input(s): \"CHOL\", \"HDL\" in the last 72 hours.    Invalid input(s): \"LDLCALCU\"  INR: No results for input(s): \"INR\" in the last 72 
Discharge instructions reviewed with patient & spouse. All questions & concerns addressed. IV access removed.   
EEG completed  
[facility]  PROGRESS NOTE    Shift date: 07/03/25    Shift day: Thursday   Shift # 2    Room # 0318/0318-01   Name: Eber Bolden                Oriental orthodox: [employer]   Place of Mandaen: unknown    Referral: ACP Request    Admit Date & Time: 7/3/2025  4:26 PM    Assessment:  Eber Bolden is a 74 y.o. male in the hospital because hyperkalemia. Upon entering the room writer observes pt in bed      Intervention:  Writer introduced self and title as . Writer offered to discuss ACP request with pt. Pt seemed confused, said he did not request consult but to come back tomorrow in case wife had requested it.    Outcome:  Writer will leave note to follow-up.    Plan:   will follow up by adding pt to follow-up list.      Electronically signed by Andrade Ceja, Intern, on 7/3/2025 at 7:34 PM.  [facility]  [phone]      
[facility]  Office: [phone]  Tomasz Aguilar, DO, Jesus Ayers, DO, Bart Cortez DO, Roddy Zavala, DO, Adrien Morton MD, Kalyn Bansal MD, Destiny Christian MD, Nisha Arvizu MD,  Jassi Isaac MD, Carla Estrada MD, Kris Burnett MD,  Alexandra Cesar DO, Cher Maciel MD, Stevie Cedeno MD, Carlos Aguilar DO, Serena Lopez MD,  Pepe Romeo DO, Harper Moses MD, Brandy Espino MD, Danyel Oliver MD,  Carlos Stacy MD, José Miguel Lyon MD, Clark Fan MD, Idania Farrell MD, Oliver Casanova MD, Sam Loza MD, Shree Sinha, DO, Oliva Ayala MD, Spike Benz DO, Christ Cool MD, Alexandra Maki MD, Mohsin Reza, MD, Georgette Reeves MD, Shirley Waterhouse, CNP,  Rochelle Olvera, CNP, Shree Covarrubias, CNP,  Yuli Lee, SHWETA, Hermelinda Sewell, CNP, Julia Ferrera, CNP, Gracia Rollins, CNP, Padma Paige, CNP, Amira Tavares, PA-C, Mary Frost, CNP, Carol Alas, CNP,  Herlinda Fish, CNP, Matilda Raygoza, CNP, Kasi Belcher, PA-C, Anne Diaz PA-C, Laquita Johnson, CNP,        Chelsea Forrest, CNS, Gabriella Ruano, CNP, Serenity Walsh, CNP         [facility]   IN-PATIENT SERVICE   [facility]    Progress Note    7/7/2025    11:18 AM    Name:   Eber Bolden  MRN:     [id number]     Acct:      [id number]   Room:   0143/0143-01   Day:  4  Admit Date:  7/3/2025  4:26 PM    PCP:   Jose De Jesus Lancaster PA-C  Code Status:  Full Code    Subjective:     C/C: Hyperkalemia    Interval History Status: improved.     Seen and examined  Wife was present at bedside  Patient denies any acute complaint  Was very upset as he is NPO for SAWYER  Patient wants to eat  Creatinine stable  Patient has subacute stroke  But denies any weakness, numbness and tingling, blurry vision  Neurology is on board  Patient was started on aspirin and Plavix  Cardiology is consulted for SAWYER    Brief History:     Per documentation  74-year-old male with history of type II diabetes mellitus, left 
NEUROLOGY INPATIENT PROGRESS NOTE    7/7/2025         Current Exam:     Chart reviewed. Discussed with RN. SAWYER completed earlier today with EF 55 to 60%, no thrombus or vegetation identified.  A loop recorder was placed.  Patient reports feeling well today, no acute complaints.  He plans to discharge home as he has been able to ambulate but is going home with home care and home therapy.          Brief History:    Eber Bolden is a  74 y.o. male with H/O COPD, HTN, sleep apnea, DM, CKD status post kidney donation in 1983, who was admitted as a transfer from [facility] on 7/3/2025 where he presented after outpatient labs revealed a potassium of 6.4.  He presented for further management of hyperkalemia with a repeat level in the ED 5.9; he was treated with calcium gluconate, IV fluids, and Lokelma but potassium remained elevated and he was transferred to [facility] for further evaluation and care.  When admitted to [facility] as family reported recent behavioral changes including agitation and memory deficits.  He had a recent episode when he went to fill up lawnmower gas cans at a local gas station but the next thing he remembered was being in [place] without being able to recollect how he got there.  MRI brain was ordered showing small areas of acute infarct in the right splenium of the corpus callosum, right occipital, and right parietal lobes.  Neurology was consulted for further evaluation given these MRI brain findings.  MRA head and neck was done showing severe narrowing of the right PCA with possible segmental occlusion with severe narrowing of the right ICA at the junction of the right cavernous and petrous segments with possible short segment dissection.  Patient's stroke etiology was felt to be secondary to iCAD versus cardioembolic source and SAWYER/loop recorder was recommended.  Patient was continued on dual antiplatelet therapy x 90 days given his iCAD and continued on statin therapy.       No current 
Occupational Therapy  Occupational Therapy Initial Evaluation  Facility/Department: [facility] 1C STEPDOWN   Patient Name: Eber Bolden        MRN: [id number]    : 1950    Date of Service: 2025    No chief complaint on file.    Past Medical History:  has a past medical history of BOOP (bronchiolitis obliterans with organizing pneumonia) ([facility]), Chronic obstructive pulmonary disease (HCC), Deep vein thrombosis of lower extremity (HCC), Difficult intravenous access, Hx of blood clots, HX OTHER MEDICAL, Hypertension, Obesity, On supplemental oxygen therapy, Pulmonary embolism (HCC), Sleep apnea, Type II or unspecified type diabetes mellitus without mention of complication, not stated as uncontrolled, and Wears glasses.  Past Surgical History:  has a past surgical history that includes Kidney donation (); Lung biopsy (); eye surgery (Bilateral, ); and Fixation Kyphoplasty (2016).    Discharge Recommendations  Discharge Recommendations: Patient would benefit from continued therapy after discharge  OT Equipment Recommendations  Equipment Needed: Yes  Mobility Devices: ADL Assistive Devices  ADL Assistive Devices: Transfer Tub Bench, Reacher, Long-handled Shoe Horn, Long-handled Sponge, Sock-Aid Hard    Assessment  Performance deficits / Impairments: Decreased functional mobility ;Decreased ADL status;Decreased balance;Decreased high-level IADLs;Decreased endurance;Decreased safe awareness  Prognosis: Good  Decision Making: Medium Complexity  REQUIRES OT FOLLOW-UP: Yes  Activity Tolerance  Activity Tolerance: Patient Tolerated treatment well;Treatment limited secondary to decreased cognition;Patient limited by fatigue  Safety Devices  Type of Devices: Patient at risk for falls;Gait belt;Call light within reach;Left in chair;Nurse notified (wife supervising pt)  Restraints  Restraints Initially in Place: No    AM-PAC  AM-PAC Daily Activity - Inpatient   How much help is needed for putting on and taking off 
Physical Therapy        Physical Therapy Cancel Note      DATE: 2025    NAME: Eber Bolden  MRN: [id number]   : 1950      Patient not seen this date for Physical Therapy due to:    Discussed with patient and nurse. He is independent. No need for PT eval.       Electronically signed by Yesi Maciel PT on 2025 at 2:30 PM     
Renal Progress Note    Patient :  Eber Bolden; 74 y.o. MRN# [id number]  Location:  0318/0318-01  Attending:  Harper Moses MD  Admit Date:  7/3/2025   Hospital Day: 2    Subjective:       Patient's blood pressure elevated. Patient is on Coreg 6.25 mg BID and hydralazine 25 Q ID. Amlodipine 10 mg added by primary service today.    No acute concerns. Patient did report one episode of profuse diarrhea. Patient is alert oriented times four. EEG neg negativeative so far. MRI Brain pending.    Labs this a.m. revealed sodium 140, potassium 4.9, bicarb 22, creatinine 1.9. Elevated Alumni ratio 1.94, urine protein less than four, urine creatinine 72, urine sodium 85, urine eosinophils absent. Renal USG pending.      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    As per chart review, two weeks ago patient was confused and was found to have UTI. Patient was started on Bactrim. Patient's mentation improved while he was on Bactrim but later again became confused. PCP ordered labs which revealed creatinine of 3.0 with hyperkalemia 6.4. Patient came to ED. Acute kidney injury was thought to be likely prerenal versus ischemic ATN from intravascular depletion from decrease intake and Bactrim effect. Less concern for AIN. Patient was started on fluids at the rate hundred normal saline and medically treated for hyperkalemia. Patient also had non-anion gap metabolic acidosis which was aggravated by Bactrim. He was started on bicarb tablets and lokelma for hyperkalemia.    Patient's creatinine improved back to baseline to 1.9 with improvement in potassium.    Outpatient Medications:     Medications Prior to Admission: atorvastatin (LIPITOR) 10 MG tablet, Take 1 tablet by mouth daily  blood glucose test 
Report received from RN, all questions answered.   
SPIRITUAL HEALTH   [facility]  PROGRESS NOTE    Shift date: 7.4.2025  Shift day: Friday   Shift # 1    Room # 0318/0318-01        Name: Eber Bolden MRN: [id number]    Age: 74 y.o.     Sex: male   Language: English   Yarsanism: Sikh   Hyperkalemia     Referral: Nurse    Date: 7/4/2025            Total Time Calculated: (P) 15 min              Spiritual Assessment began in [facility]MED SURG        Referral/Consult From: (P) Nurse   Encounter Overview/Reason: (P) Advance Care Planning  Service Provided For: (P) Patient    Admit Date & Time: 7/3/2025  4:26 PM    Emergency Contacts Listed:  Extended Emergency Contact Information  Primary Emergency Contact: Audra Bolden  Address: [address] [place] [address] [facility]  Home Phone: [phone]  Mobile Phone: [phone]  Relation: Spouse  Secondary Emergency Contact: Freya Swenson  Address: [address] [place] [address]  Home Phone: [phone]  Mobile Phone: [phone]  Relation: Child      Assessment:  Eber Bolden is a 74 y.o. male in the hospital because Hyperkalemia.     Upon entering the room writer observes Patient appearing Calm and Coping.  Patient states that he came in because he was confused due to a UTI.  Explained how scary it was to feel that way.  States he is feeling better and waiting for his wife to arrive.       presented POA and Living Will documents for review.  Patient may be interested in completing Living Will documents once wife arrives.  Documents provided at bedside for the patient to review further.      Perceived Need:  Build a relationship of care and support and Receive care and concern    Intervention:  Writer introduced self and title as  and Acknowledged current situation and Actively listened.    Outcome:  Built relationships of care and support     Plan:  Chaplains will follow up as needed       Electronically signed by GERTRUDIS Simpson on 
[facility]  Office: [phone]  Tomasz Aguilar, DO, Jesus Ayers, DO, Bart Cortez DO, Roddy Zavala, DO, Adrien Morton MD, Kalyn Bansal MD, Destiny Christian MD, Nisha Arvizu MD,  Jassi Isaac MD, Carla Estrada MD, Kris Burnett MD,  Alexandra Cesar DO, Cher Maciel MD, Stevie Cedeno MD, Carlos Aguilar DO, Serena Lopez MD,  Pepe Romeo DO, Harper Moses MD, Brandy Espino MD, Danyel Oliver MD,  Carlos Stacy MD, José Miguel Lyon MD, Clark Fan MD, Idania Farrell MD, Oliver Casanova MD, Sam Loza MD, Shree Sinha, DO, Oliva Ayala MD, Spike Benz DO, Christ Cool MD, Alexandra Maki MD, Mohsin Reza, MD, Georgette Reeves MD, Shirley Waterhouse, CNP,  Rochelle Olvera, CNP, Shree Covarrubias, CNP,  Yuli Lee, SHWETA, Hermelinda Sewell, CNP, Julia Ferrera, CNP, Gracia Rollins, CNP, Padma Paige, CNP, Amira Tavares, PA-C, Mary Frost, CNP, Carol Alas, CNP,  Herlinda Fish, CNP, Matilda Raygoza, CNP, Kasi Belcher, PA-C, Anne Diaz PA-C, Laquita Johnson, CNP,        Chelsea Forrest, CNS, Gabriella Ruano, CNP, Serenity Walsh, CNP         [facility]   IN-PATIENT SERVICE   [facility]    Progress Note    7/6/2025    7:44 AM    Name:   Eber Bolden  MRN:     [id number]     Acct:      [id number]   Room:   0318/0318-01   Day:  3  Admit Date:  7/3/2025  4:26 PM    PCP:   Jose De Jesus Lancaster PA-C  Code Status:  Full Code    Subjective:     C/C: Hyperkalemia    Interval History Status: improved.     Seen and examined  Wife was present at bedside  Patient denies any acute complaint  Patient wants to go home  Creatinine is improving  Patient has subacute stroke  But denies any weakness, numbness and tingling, blurry vision  Neurology is on board  Patient was started on aspirin and Plavix  Cardiology is consulted for SAWYER    Brief History:     Per documentation  74-year-old male with history of type II diabetes mellitus, left solitary kidney(donated right 
  BILIDIR  --   --   --   --  <0.1  --   --    POCGLU  --  102 47* 70*  --  98 148*     ABG:No results found for: \"POCPH\", \"PHART\", \"PH\", \"POCPCO2\", \"FKE1NLO\", \"PCO2\", \"POCPO2\", \"PO2ART\", \"PO2\", \"POCHCO3\", \"CZH2UKF\", \"HCO3\", \"NBEA\", \"PBEA\", \"BEART\", \"BE\", \"THGBART\", \"THB\", \"FQD4WEW\", \"PYGH8KMK\", \"B9BAVMHO\", \"O2SAT\", \"FIO2\"  Lab Results   Component Value Date/Time    SPECIAL Site: Urine 06/24/2025 10:28 PM     Lab Results   Component Value Date/Time    CULTURE ESCHERICHIA COLI >100,000 CFU/ML (A) 06/24/2025 10:28 PM       Radiology:  No results found.    Physical Examination:        General appearance:  alert, cooperative and no distress  Mental Status:  oriented to person, place and time and normal affect  Lungs:  clear to auscultation bilaterally, normal effort  Heart:  regular rate and rhythm, no murmur  Abdomen:  soft, nontender, nondistended, normal bowel sounds, no masses, hepatomegaly, splenomegaly  Extremities:  no edema, redness, tenderness in the calves  Skin:  no gross lesions, rashes, induration    Assessment:        Hospital Problems           Last Modified POA    * (Principal) Hyperkalemia 7/3/2025 Yes    CKD (chronic kidney disease) stage 3, GFR 30-59 ml/min ([facility]) 7/3/2025 Yes    Other hyperlipidemia 7/3/2025 Yes    Type 2 diabetes mellitus (HCC) 7/3/2025 Yes    LAURA (acute kidney injury) 7/3/2025 Yes     Stage 3 LAURA with hyperkalemia in a solitary functioning kidney : sec to pre renal Laura   Possible some CKD at baseline   Acute memory impairment along with progressive neurological changes and cognitive impairment   Insulin dependant diabetes mellitus with hypoglycemia and hyperglycemia   Hypertension uncontrolled     Plan:        MRI brain   Continue I.v fluids   Low potassium diet   Lokelma daily   EEG     Harperprincess Moses MD  7/4/2025  1:16 PM   
    Addendum at 4:41 pm   MRI shows acute stroke likely secondary to hypertensive emergency   Neurology consulted   Start aspirin , statin , increase coreg and add amlodipine

## 2025-07-08 NOTE — DISCHARGE SUMMARY
[facility]  Office: [phone]  Tomasz Aguilar DO, Jesus Ayers, DO, Bart Cortez DO, Roddy Zavala, DO, Adrien Morton MD, Kalyn Bansal MD, Destiny Christian MD, Nisha Arvizu MD,  Jassi Isaac MD, Carla Estrada MD, Kris Burnett MD,  Alexandra Cesar DO, Cher Maciel MD, Steive Cedeno MD, Carlos Aguilar DO, Serena Lopez MD,  Pepe Romeo DO, Harper Moses MD, Brandy Espino MD, Danyel Oliver MD,  Carlos Stacy MD, José Miguel Lyon MD, Clark Fan MD, Idania Farrell MD, Oliver Casanova MD, Sam Loza MD, Shree Sinha, DO, Oliva Ayala MD, Spike Benz DO, Christ Cool MD, Alexandra Maki MD, Mohsin Reza, MD, Georgette Reeves MD, Shirley Waterhouse, CNP,  Rochelle Olvera, CNP, Shree Covarrubias, CNP,  Yuli Lee, SHWETA, Hermelinda Sewell, CNP, Julia Ferrera, CNP, Gracia Rollins, CNP, Padma Paige, CNP, Amira Tavares, PA-C, Mary Frost, CNP, Carol Alas, CNP,  Herlinda Fish, CNP, Matilda Raygoza, CNP, Kasi Belcher, PA-C, Anne Diaz PA-C, Laquita Johnson, CNP,        Chelsea Forrest, CNS, Gabriella Ruano, CNP, Serenity Walsh, CNP         [facility]   IN-PATIENT SERVICE   [facility]    Discharge Summary     Patient ID: Eber Bolden  :  1950   MRN: [id number]     ACCOUNT:  [id number]   Patient's PCP: Jose De Jesus Lancaster PA-C  Admit Date: 7/3/2025   Discharge Date: 2025  Length of Stay: 5  Code Status:  Prior  Admitting Physician: Brandy Espino MD  Discharge Physician: Clark Tomlinson Sra, MD     Active Discharge Diagnoses:     Hospital Problem Lists:  Principal Problem:    JOESPH (acute kidney injury)  Active Problems:    Cerebrovascular accident (CVA) (HCC)    CKD (chronic kidney disease) stage 3, GFR 30-59 ml/min (HCC)    Other hyperlipidemia    Type 2 diabetes mellitus (HCC)    Hyperkalemia    Acute right arterial ischemic stroke, PCA (posterior cerebral artery) (HCC)    Intracranial atherosclerosis  Resolved Problems:    * No resolved 
with provider.          Current Discharge Medication List    STOP taking these medications    LANTUS SOLOSTAR 100 UNIT/ML injection pen  Comments:  Reason for Stopping:    glimepiride (AMARYL) 4 MG tablet  Comments:  Reason for Stopping:          Time Spent on Discharge:  minutes were spent in patient examination, evaluation, counseling as well as medication reconciliation, prescriptions for required medications, discharge plan, and follow up.    Electronically signed by Harper Moses MD on 7/4/25 at 1:07 PM EDT

## 2025-07-08 NOTE — PLAN OF CARE
Problem: Chronic Conditions and Co-morbidities  Goal: Patient's chronic conditions and co-morbidity symptoms are monitored and maintained or improved  7/4/2025 1826 by Ankita Ramos RN  Outcome: Progressing     Problem: Discharge Planning  Goal: Discharge to home or other facility with appropriate resources  Outcome: Progressing     Problem: Safety - Adult  Goal: Free from fall injury  7/4/2025 1826 by Ankita Ramos, RN  Outcome: Progressing     
  Problem: Chronic Conditions and Co-morbidities  Goal: Patient's chronic conditions and co-morbidity symptoms are monitored and maintained or improved  7/5/2025 0545 by Mary Torres RN  Outcome: Progressing  7/4/2025 1826 by Ankita Ramos RN  Outcome: Progressing     Problem: Discharge Planning  Goal: Discharge to home or other facility with appropriate resources  7/5/2025 0545 by Mary Torres RN  Outcome: Progressing  7/4/2025 1826 by Ankita Ramos RN  Outcome: Progressing     Problem: Safety - Adult  Goal: Free from fall injury  7/5/2025 0545 by Mary Torres RN  Outcome: Progressing  7/4/2025 1826 by Ankita Ramos RN  Outcome: Progressing     
  Problem: Chronic Conditions and Co-morbidities  Goal: Patient's chronic conditions and co-morbidity symptoms are monitored and maintained or improved  7/5/2025 0916 by Ana Montgomery RN  Outcome: Progressing  7/5/2025 0545 by Mary Torres RN  Outcome: Progressing     Problem: Discharge Planning  Goal: Discharge to home or other facility with appropriate resources  7/5/2025 0916 by Ana Montgomery RN  Outcome: Progressing  7/5/2025 0545 by Mary Torres RN  Outcome: Progressing     Problem: Safety - Adult  Goal: Free from fall injury  7/5/2025 0916 by Ana Montgomery RN  Outcome: Progressing  7/5/2025 0545 by Mary Torres RN  Outcome: Progressing     
  Problem: Chronic Conditions and Co-morbidities  Goal: Patient's chronic conditions and co-morbidity symptoms are monitored and maintained or improved  7/6/2025 1415 by Eber Mock RN  Outcome: Progressing  7/6/2025 0421 by Mary Torres RN  Outcome: Progressing     Problem: Discharge Planning  Goal: Discharge to home or other facility with appropriate resources  7/6/2025 1415 by Eber Mock RN  Outcome: Progressing  7/6/2025 0421 by Mary Torres RN  Outcome: Progressing     Problem: Safety - Adult  Goal: Free from fall injury  7/6/2025 1415 by Eber Mock RN  Outcome: Progressing  7/6/2025 0421 by Mary Torres RN  Outcome: Progressing     
  Problem: Chronic Conditions and Co-morbidities  Goal: Patient's chronic conditions and co-morbidity symptoms are monitored and maintained or improved  Outcome: Progressing     Problem: Discharge Planning  Goal: Discharge to home or other facility with appropriate resources  Outcome: Progressing     Problem: Safety - Adult  Goal: Free from fall injury  Outcome: Progressing     
  Problem: Chronic Conditions and Co-morbidities  Goal: Patient's chronic conditions and co-morbidity symptoms are monitored and maintained or improved  Outcome: Progressing     Problem: Discharge Planning  Goal: Discharge to home or other facility with appropriate resources  Outcome: Progressing     Problem: Safety - Adult  Goal: Free from fall injury  Outcome: Progressing     Problem: Neurosensory - Adult  Goal: Achieves stable or improved neurological status  Outcome: Progressing  Goal: Absence of seizures  Outcome: Progressing  Goal: Achieves maximal functionality and self care  Outcome: Progressing     Problem: Skin/Tissue Integrity - Adult  Goal: Skin integrity remains intact  Outcome: Progressing  Goal: Incisions, wounds, or drain sites healing without S/S of infection  Outcome: Progressing  Goal: Oral mucous membranes remain intact  Outcome: Progressing     Problem: Musculoskeletal - Adult  Goal: Return mobility to safest level of function  Outcome: Progressing  Goal: Maintain proper alignment of affected body part  Outcome: Progressing  Goal: Return ADL status to a safe level of function  Outcome: Progressing     Problem: Metabolic/Fluid and Electrolytes - Adult  Goal: Electrolytes maintained within normal limits  Outcome: Progressing  Goal: Hemodynamic stability and optimal renal function maintained  Outcome: Progressing  Goal: Glucose maintained within prescribed range  Outcome: Progressing     
  Problem: Chronic Conditions and Co-morbidities  Goal: Patient's chronic conditions and co-morbidity symptoms are monitored and maintained or improved  Outcome: Progressing     Problem: Safety - Adult  Goal: Free from fall injury  Outcome: Progressing     
  Problem: Discharge Planning  Goal: Discharge to home or other facility with appropriate resources  7/6/2025 0421 by Mary Torres RN  Outcome: Progressing     
  Problem: Safety - Adult  Goal: Free from fall injury  7/7/2025 1742 by Vanessa Arauz, RN  Outcome: Progressing     Problem: Neurosensory - Adult  Goal: Achieves stable or improved neurological status  7/7/2025 1742 by Vanessa Arauz, RN  Outcome: Progressing     
Please fill out the MRI Screening form and fax to dept @ [phone] or complete online checklist. Any questions..please call [facility] @  [phone].  MRI exam will be scheduled after receiving the completed screening form. Thank you!!      MRI will be back in at normal business hours tomorrow on 7/5/2025 at 7am.  Please have checklist completed prior to then.      
infection  Outcome: Progressing  Goal: Oral mucous membranes remain intact  Outcome: Progressing     Problem: Musculoskeletal - Adult  Goal: Return mobility to safest level of function  Outcome: Progressing  Goal: Maintain proper alignment of affected body part  Outcome: Progressing  Goal: Return ADL status to a safe level of function  Outcome: Progressing     Problem: Metabolic/Fluid and Electrolytes - Adult  Goal: Electrolytes maintained within normal limits  Outcome: Progressing  Goal: Hemodynamic stability and optimal renal function maintained  Outcome: Progressing  Goal: Glucose maintained within prescribed range  Outcome: Progressing

## 2025-07-08 NOTE — DISCHARGE INSTRUCTIONS
Continue taking medications as prescribed, follow-up with PCP within 1 week for further optimization of medications  Follow-up with heart doctor as given in 4 weeks  Follow-up with neurologist as given for history of stroke and possible workup for seizures  Follow-up with blood doctor for abnormal labs done in hospital-elevated kappa/lambda ratio  Get labs done as ordered for your kidney numbers, follow-up with kidney doctor for the results and further optimization of medications

## 2025-07-08 NOTE — CARE COORDINATION
Case Management   Daily Progress Note       Patient Name: Eber Bolden                   YOB: 1950  Diagnosis: JOESPH (acute kidney injury) [N17.9]  Hyperkalemia [E87.5]                       GMLOS: 3 days  Length of Stay: 4  days    Anticipated Discharge Date: Two or more days before discharge    Readmission Risk (Low < 19, Mod (19-27), High > 27): Readmission Risk Score: 14.9        Current Transitional Plan    [] Home Independently    [x] Home with HC    [] Skilled Nursing Facility    [] Acute Rehabilitation    [] Long Term Acute Care (LTAC)    [] Other:     Plan for the Stay (Medical Management) :          Workflow Continuation (Additional Notes) : Plan is home with HHC and wife she will transport. Home Health Care list provided, awaiting choices.         CHITO REDMAN RN  July 7, 2025        
Case Management   Daily Progress Note       Patient Name: Eber Bolden                   YOB: 1950  Diagnosis: JOESPH (acute kidney injury) [N17.9]  Hyperkalemia [E87.5]                       GMLOS: 3 days  Length of Stay: 5  days    Anticipated Discharge Date: Ready for discharge    Readmission Risk (Low < 19, Mod (19-27), High > 27): Readmission Risk Score: 14.7        Current Transitional Plan    [] Home Independently    [x] Home with HC    [] Skilled Nursing Facility    [] Acute Rehabilitation    [] Long Term Acute Care (LTAC)    [] Other:     Plan for the Stay (Medical Management) :          Workflow Continuation (Additional Notes) :Plan is home with family and HHC. Referral sent to Med 1 per pt request. Family will transport.    1540 MED1 denied, new referrals placed [facility], [facility], and rakeshtenders    1630 Pt changed mind about HHC declined service state he will f/u with PCP if needed.         CHITO REDMAN RN  July 8, 2025        
Met with pt to assess for support and complete PHQ-9 screen. Pt stated he lives with his wife and dog. Stated has 2 dtrs and one son and they all live in the area. He shared he has 9 grchildren. Pt stated his brother lives here as well. Pt stated family is supportive and help out as needed. He also identifies having supportive neighbors and friends. Pt denies any recent feelings of depression/SI.  Patient Health Questionnaire-9 (PHQ-9)    Over the last 2 weeks, how often have you been bothered by any of the following problems?    1. Little Interest or pleasure in doing things?   [x] Not at all  [] Several Days  [] More than half the day  []  Nearly every day    2. Feeling down, depressed or hopeless?    [x] Not at all  [] Several Days  [] More than half the day  []  Nearly every day    3. Trouble falling or staying asleep, or sleeping too much?   [x] Not at all  [] Several Days  [] More than half the day  []  Nearly every day    4. Feeling tired or having little energy?   [x] Not at all  [] Several Days  [] More than half the day  []  Nearly every day    5. Poor apettite or overeating?   [x] Not at all  [] Several Days  [] More than half the day  []  Nearly every day    6. Feeling bad about yourself-or that you are a failure or have let yourself or your family down?   [x] Not at all  [] Several Days  [] More than half the day  []  Nearly every day    7. Trouble concentrating on things, such as reading the newspaper or watching television?   [x] Not at all  [] Several Days  [] More than half the day  []  Nearly every day    8. Moving or speaking so slowly that other people could have noticed? Or the opposite-being so fidgety or restless that you have been moving around a lot more than usual?   [x] Not at all  [] Several Days  [] More than half the day  []  Nearly every day    9. Thoughts that you would be better off dead or of hurting yourself in some way?   [x] Not at all  [] Several Days  [] More than half the day  
Issues/Barriers to RETURNING to current housing: none  Potential Assistance needed at discharge:              Potential DME:    Patient expects to discharge to: (P) House  Plan for transportation at discharge: (P) Family    Financial    Payor: [facility] MEDICARE / Plan: [facility] MEDICARE ADVANTAGE / Product Type: *No Product type* /     Does insurance require precert for SNF: Yes    Potential assistance Purchasing Medications: (P) No  Meds-to-Beds request:        [facility] - [place], [place] - [address] [facility] - P [phone] - F [phone]  [address]  [address]  [facility] [address]  Phone: [phone] Fax: [phone]    [facility] [id number] - [place], [place] - [address] - P [phone] - F [phone]  [address] [facility] [address]  Phone: [phone] Fax: [phone]      Notes:    Factors facilitating achievement of predicted outcomes: Family support, Motivated, Cooperative, and Pleasant    Barriers to discharge: clinical status    Additional Case Management Notes: Home independently with wife declines hc and dme needs has transportation    The Plan for Transition of Care is related to the following treatment goals of JOESPH (acute kidney injury) [N17.9]  Hyperkalemia [E87.5]    IF APPLICABLE: The Patient and/or patient representative Eber and his family were provided with a choice of provider and agrees with the discharge plan. Freedom of choice list with basic dialogue that supports the patient's individualized plan of care/goals and shares the quality data associated with the providers was provided to: (P) Patient   Patient Representative Name:       The Patient and/or Patient Representative Agree with the Discharge Plan? (P) Yes    Catherine Sanchez RN  Case Management Department  Ph:  Fax:

## 2025-07-09 ENCOUNTER — TELEPHONE (OUTPATIENT)
Dept: PRIMARY CARE CLINIC | Age: 75
End: 2025-07-09

## 2025-07-09 DIAGNOSIS — E87.5 HYPERKALEMIA: Primary | ICD-10-CM

## 2025-07-09 LAB
ALPHA-TOCOPHEROL: 6.2 MG/L (ref 5.5–18)
GAMMA-TOCOPHEROL: 0.9 MG/L (ref 0–6)

## 2025-07-09 NOTE — TELEPHONE ENCOUNTER
Unfortunately, there is no generic for Jardiance.  He may need to look into patient's assistance through their website.

## 2025-07-09 NOTE — TELEPHONE ENCOUNTER
Last appt 07/02/25  Next appt 07/11/25    Pt's daughter Freya called stating hospital had prescribed Lokelma ?? for potassium levels but it is too expensive.    Freya is asking if PCP would be able to send an alternative for this.    Please advise and call Freya back in regards to this.

## 2025-07-09 NOTE — TELEPHONE ENCOUNTER
Patients daughter notified. She is requesting the generic for Jardiance as well as the jardiance is too expensive.

## 2025-07-11 ENCOUNTER — OFFICE VISIT (OUTPATIENT)
Dept: PRIMARY CARE CLINIC | Age: 75
End: 2025-07-11
Payer: MEDICARE

## 2025-07-11 ENCOUNTER — HOSPITAL ENCOUNTER (OUTPATIENT)
Age: 75
Setting detail: SPECIMEN
Discharge: HOME | End: 2025-07-11

## 2025-07-11 VITALS
DIASTOLIC BLOOD PRESSURE: 58 MMHG | RESPIRATION RATE: 18 BRPM | WEIGHT: 235.8 LBS | BODY MASS INDEX: 34.82 KG/M2 | HEART RATE: 62 BPM | OXYGEN SATURATION: 96 % | SYSTOLIC BLOOD PRESSURE: 118 MMHG

## 2025-07-11 DIAGNOSIS — E87.5 HYPERKALEMIA: ICD-10-CM

## 2025-07-11 DIAGNOSIS — N18.32 TYPE 2 DIABETES MELLITUS WITH STAGE 3B CHRONIC KIDNEY DISEASE, WITH LONG-TERM CURRENT USE OF INSULIN (HCC): ICD-10-CM

## 2025-07-11 DIAGNOSIS — E11.22 TYPE 2 DIABETES MELLITUS WITH STAGE 3B CHRONIC KIDNEY DISEASE, WITH LONG-TERM CURRENT USE OF INSULIN (HCC): ICD-10-CM

## 2025-07-11 DIAGNOSIS — Z79.4 TYPE 2 DIABETES MELLITUS WITH STAGE 3B CHRONIC KIDNEY DISEASE, WITH LONG-TERM CURRENT USE OF INSULIN (HCC): ICD-10-CM

## 2025-07-11 DIAGNOSIS — Z86.73 HISTORY OF CVA (CEREBROVASCULAR ACCIDENT): ICD-10-CM

## 2025-07-11 DIAGNOSIS — Z09 HOSPITAL DISCHARGE FOLLOW-UP: Primary | ICD-10-CM

## 2025-07-11 LAB
ANION GAP SERPL CALCULATED.3IONS-SCNC: 16 MMOL/L (ref 9–16)
BASOPHILS # BLD: 0.05 K/UL (ref 0–0.2)
BASOPHILS NFR BLD: 1 % (ref 0–2)
BUN SERPL-MCNC: 44 MG/DL (ref 8–23)
CALCIUM SERPL-MCNC: 9.8 MG/DL (ref 8.6–10.4)
CHLORIDE SERPL-SCNC: 101 MMOL/L (ref 98–107)
CO2 SERPL-SCNC: 21 MMOL/L (ref 20–31)
CREAT SERPL-MCNC: 1.9 MG/DL (ref 0.7–1.2)
EOSINOPHIL # BLD: 0.19 K/UL (ref 0–0.44)
EOSINOPHILS RELATIVE PERCENT: 2 % (ref 1–4)
ERYTHROCYTE [DISTWIDTH] IN BLOOD BY AUTOMATED COUNT: 14.6 % (ref 11.8–14.4)
GFR, ESTIMATED: 36 ML/MIN/1.73M2
GLUCOSE SERPL-MCNC: 165 MG/DL (ref 74–99)
HCT VFR BLD AUTO: 37.3 % (ref 40.7–50.3)
HGB BLD-MCNC: 12.2 G/DL (ref 13–17)
IMM GRANULOCYTES # BLD AUTO: 0.04 K/UL (ref 0–0.3)
IMM GRANULOCYTES NFR BLD: 0 %
LYMPHOCYTES NFR BLD: 1.94 K/UL (ref 1.1–3.7)
LYMPHOCYTES RELATIVE PERCENT: 19 % (ref 24–43)
MCH RBC QN AUTO: 34.4 PG (ref 25.2–33.5)
MCHC RBC AUTO-ENTMCNC: 32.7 G/DL (ref 28.4–34.8)
MCV RBC AUTO: 105.1 FL (ref 82.6–102.9)
MONOCYTES NFR BLD: 0.78 K/UL (ref 0.1–1.2)
MONOCYTES NFR BLD: 8 % (ref 3–12)
NEUTROPHILS NFR BLD: 70 % (ref 36–65)
NEUTS SEG NFR BLD: 7.31 K/UL (ref 1.5–8.1)
NRBC BLD-RTO: 0 PER 100 WBC
PLATELET # BLD AUTO: 194 K/UL (ref 138–453)
PMV BLD AUTO: 10.5 FL (ref 8.1–13.5)
POTASSIUM SERPL-SCNC: 5.2 MMOL/L (ref 3.7–5.3)
RBC # BLD AUTO: 3.55 M/UL (ref 4.21–5.77)
RBC # BLD: ABNORMAL 10*6/UL
RBC # BLD: ABNORMAL 10*6/UL
SODIUM SERPL-SCNC: 138 MMOL/L (ref 136–145)
WBC OTHER # BLD: 10.3 K/UL (ref 3.5–11.3)

## 2025-07-11 PROCEDURE — 3078F DIAST BP <80 MM HG: CPT | Performed by: PHYSICIAN ASSISTANT

## 2025-07-11 PROCEDURE — 3074F SYST BP LT 130 MM HG: CPT | Performed by: PHYSICIAN ASSISTANT

## 2025-07-11 PROCEDURE — 1111F DSCHRG MED/CURRENT MED MERGE: CPT | Performed by: PHYSICIAN ASSISTANT

## 2025-07-11 PROCEDURE — 3051F HG A1C>EQUAL 7.0%<8.0%: CPT | Performed by: PHYSICIAN ASSISTANT

## 2025-07-11 PROCEDURE — 99214 OFFICE O/P EST MOD 30 MIN: CPT | Performed by: PHYSICIAN ASSISTANT

## 2025-07-11 PROCEDURE — 1123F ACP DISCUSS/DSCN MKR DOCD: CPT | Performed by: PHYSICIAN ASSISTANT

## 2025-07-11 PROCEDURE — 1126F AMNT PAIN NOTED NONE PRSNT: CPT | Performed by: PHYSICIAN ASSISTANT

## 2025-07-11 RX ORDER — BUSPIRONE HYDROCHLORIDE 5 MG/1
5 TABLET ORAL 2 TIMES DAILY
Qty: 30 TABLET | Refills: 0 | Status: SHIPPED | OUTPATIENT
Start: 2025-07-11 | End: 2025-07-28 | Stop reason: SDUPTHER

## 2025-07-11 RX ORDER — SODIUM BICARBONATE 650 MG/1
650 TABLET ORAL 3 TIMES DAILY
Qty: 45 TABLET | Refills: 0 | Status: SHIPPED | OUTPATIENT
Start: 2025-07-11 | End: 2025-07-28 | Stop reason: SDUPTHER

## 2025-07-11 SDOH — ECONOMIC STABILITY: FOOD INSECURITY: WITHIN THE PAST 12 MONTHS, THE FOOD YOU BOUGHT JUST DIDN'T LAST AND YOU DIDN'T HAVE MONEY TO GET MORE.: NEVER TRUE

## 2025-07-11 SDOH — ECONOMIC STABILITY: FOOD INSECURITY: WITHIN THE PAST 12 MONTHS, YOU WORRIED THAT YOUR FOOD WOULD RUN OUT BEFORE YOU GOT MONEY TO BUY MORE.: NEVER TRUE

## 2025-07-11 ASSESSMENT — PATIENT HEALTH QUESTIONNAIRE - PHQ9
SUM OF ALL RESPONSES TO PHQ QUESTIONS 1-9: 0
2. FEELING DOWN, DEPRESSED OR HOPELESS: NOT AT ALL
1. LITTLE INTEREST OR PLEASURE IN DOING THINGS: NOT AT ALL
SUM OF ALL RESPONSES TO PHQ QUESTIONS 1-9: 0

## 2025-07-11 NOTE — PROGRESS NOTES
Post-Discharge Transitional Care  Follow Up      Eber Bolden   YOB: 1950    Date of Office Visit:  7/11/2025  Date of Hospital Admission: 7/3/25  Date of Hospital Discharge: 7/8/25  Risk of hospital readmission (high >=14%. Medium >=10%) :Readmission Risk Score: 15.4      Care management risk score Rising risk (score 2-5) and Complex Care (Scores >=6): No Risk Score On File     Non face to face  following discharge, date last encounter closed (first attempt may have been earlier): *No documented post hospital discharge outreach found in the last 14 days    Call initiated 2 business days of discharge: *No response recorded in the last 14 days    ASSESSMENT/PLAN:   Hospital discharge follow-up  -     [place] DISCHARGE MEDS RECONCILED W/ CURRENT OUTPATIENT MED LIST  Type 2 diabetes mellitus with stage 3b chronic kidney disease, with long-term current use of insulin (HCC)  -     empagliflozin (JARDIANCE) 10 MG tablet; Take 1 tablet by mouth daily, Disp-90 tablet, R-1Normal  -     Basic Metabolic Panel; Future  Hyperkalemia  -     Basic Metabolic Panel; Future  -     CBC with Auto Differential; Future  History of CVA (cerebrovascular accident)    Reviewed hospital course and stay.  He will follow appropriately with cardiology, nephrology, neurology.  He is compliant with medications.  We discussed portance of glucose control.  Update BMP and CBC.    Medical Decision Making: high complexity  Return in 2 weeks (on 7/25/2025).    On this date 7/11/2025 I have spent 30 minutes reviewing previous notes, test results and face to face with the patient discussing the diagnosis and importance of compliance with the treatment plan as well as documenting on the day of the visit.       Subjective:   HPI:  Follow up of Hospital problems/diagnosis(es):   CVA  Acute kidney injury  Hyperkalemia    Inpatient course: Discharge summary reviewed- see chart.    Interval history/Current status:   Patient presents with daughter and

## 2025-07-14 ASSESSMENT — ENCOUNTER SYMPTOMS
VOMITING: 0
COUGH: 0
DIARRHEA: 0
CONSTIPATION: 0
EYE PAIN: 0
RHINORRHEA: 0
SINUS PAIN: 0
ABDOMINAL PAIN: 0
BACK PAIN: 0
NAUSEA: 0
SHORTNESS OF BREATH: 0

## 2025-07-28 ENCOUNTER — TELEPHONE (OUTPATIENT)
Age: 75
End: 2025-07-28

## 2025-07-28 ENCOUNTER — INITIAL CONSULT (OUTPATIENT)
Age: 75
End: 2025-07-28
Payer: MEDICARE

## 2025-07-28 ENCOUNTER — HOSPITAL ENCOUNTER (OUTPATIENT)
Age: 75
Discharge: HOME | End: 2025-07-28
Payer: MEDICARE

## 2025-07-28 VITALS
HEART RATE: 59 BPM | RESPIRATION RATE: 15 BRPM | WEIGHT: 237.4 LBS | OXYGEN SATURATION: 95 % | DIASTOLIC BLOOD PRESSURE: 68 MMHG | TEMPERATURE: 97.2 F | SYSTOLIC BLOOD PRESSURE: 140 MMHG | BODY MASS INDEX: 35.06 KG/M2

## 2025-07-28 DIAGNOSIS — D89.2 PARAPROTEINEMIA: ICD-10-CM

## 2025-07-28 DIAGNOSIS — R91.8 ABNORMAL FINDINGS ON DIAGNOSTIC IMAGING OF LUNG: ICD-10-CM

## 2025-07-28 DIAGNOSIS — D89.2 PARAPROTEINEMIA: Primary | ICD-10-CM

## 2025-07-28 DIAGNOSIS — R76.8 ELEVATED SERUM IMMUNOGLOBULIN FREE LIGHT CHAIN LEVEL: ICD-10-CM

## 2025-07-28 LAB
ALBUMIN SERPL-MCNC: 4.4 G/DL (ref 3.5–5.2)
ALBUMIN/GLOB SERPL: 1.5 {RATIO} (ref 1–2.5)
ALP SERPL-CCNC: 88 U/L (ref 40–129)
ALT SERPL-CCNC: 26 U/L (ref 10–50)
ANION GAP SERPL CALCULATED.3IONS-SCNC: 13 MMOL/L (ref 9–16)
AST SERPL-CCNC: 23 U/L (ref 10–50)
BASOPHILS # BLD: 0.1 K/UL (ref 0–0.2)
BASOPHILS NFR BLD: 1 % (ref 0–2)
BILIRUB SERPL-MCNC: 0.5 MG/DL (ref 0–1.2)
BUN SERPL-MCNC: 27 MG/DL (ref 8–23)
CALCIUM SERPL-MCNC: 9.1 MG/DL (ref 8.6–10.4)
CHLORIDE SERPL-SCNC: 103 MMOL/L (ref 98–107)
CO2 SERPL-SCNC: 23 MMOL/L (ref 20–31)
CREAT SERPL-MCNC: 1.8 MG/DL (ref 0.7–1.2)
EOSINOPHIL # BLD: 0.3 K/UL (ref 0–0.4)
EOSINOPHILS RELATIVE PERCENT: 3 % (ref 1–4)
ERYTHROCYTE [DISTWIDTH] IN BLOOD BY AUTOMATED COUNT: 14.6 % (ref 12.5–15.4)
FREE KAPPA/LAMBDA RATIO: 1.6 (ref 0.22–1.74)
GFR, ESTIMATED: 39 ML/MIN/1.73M2
GLUCOSE SERPL-MCNC: 173 MG/DL (ref 74–99)
HCT VFR BLD AUTO: 34.7 % (ref 41–53)
HGB BLD-MCNC: 11.7 G/DL (ref 13.5–17.5)
IGA SERPL-MCNC: 201 MG/DL (ref 70–400)
IGG SERPL-MCNC: 1034 MG/DL (ref 700–1600)
IGM SERPL-MCNC: 137 MG/DL (ref 40–230)
KAPPA LC FREE SER-MCNC: 60.7 MG/L
LAMBDA LC FREE SERPL-MCNC: 37.9 MG/L (ref 4.2–27.7)
LYMPHOCYTES NFR BLD: 1.6 K/UL (ref 1–4.8)
LYMPHOCYTES RELATIVE PERCENT: 19 % (ref 24–44)
MCH RBC QN AUTO: 33.1 PG (ref 26–34)
MCHC RBC AUTO-ENTMCNC: 33.7 G/DL (ref 31–37)
MCV RBC AUTO: 98.2 FL (ref 80–100)
MONOCYTES NFR BLD: 0.8 K/UL (ref 0.1–1.2)
MONOCYTES NFR BLD: 9 % (ref 2–11)
NEUTROPHILS NFR BLD: 68 % (ref 36–66)
NEUTS SEG NFR BLD: 5.7 K/UL (ref 1.8–7.7)
PLATELET # BLD AUTO: 192 K/UL (ref 140–450)
PMV BLD AUTO: 8.4 FL (ref 6–12)
POTASSIUM SERPL-SCNC: 4.5 MMOL/L (ref 3.7–5.3)
PROT SERPL-MCNC: 7.3 G/DL (ref 6.6–8.7)
RBC # BLD AUTO: 3.53 M/UL (ref 4.5–5.9)
SODIUM SERPL-SCNC: 139 MMOL/L (ref 136–145)
WBC OTHER # BLD: 8.5 K/UL (ref 3.5–11)

## 2025-07-28 PROCEDURE — 3077F SYST BP >= 140 MM HG: CPT | Performed by: INTERNAL MEDICINE

## 2025-07-28 PROCEDURE — 82784 ASSAY IGA/IGD/IGG/IGM EACH: CPT

## 2025-07-28 PROCEDURE — 36415 COLL VENOUS BLD VENIPUNCTURE: CPT

## 2025-07-28 PROCEDURE — 86334 IMMUNOFIX E-PHORESIS SERUM: CPT

## 2025-07-28 PROCEDURE — 80053 COMPREHEN METABOLIC PANEL: CPT

## 2025-07-28 PROCEDURE — 3078F DIAST BP <80 MM HG: CPT | Performed by: INTERNAL MEDICINE

## 2025-07-28 PROCEDURE — 83521 IG LIGHT CHAINS FREE EACH: CPT

## 2025-07-28 PROCEDURE — 99205 OFFICE O/P NEW HI 60 MIN: CPT | Performed by: INTERNAL MEDICINE

## 2025-07-28 PROCEDURE — 85025 COMPLETE CBC W/AUTO DIFF WBC: CPT

## 2025-07-28 RX ORDER — HYDRALAZINE HYDROCHLORIDE 25 MG/1
25 TABLET, FILM COATED ORAL 4 TIMES DAILY
Qty: 120 TABLET | Refills: 3 | Status: SHIPPED | OUTPATIENT
Start: 2025-07-28

## 2025-07-28 RX ORDER — ATORVASTATIN CALCIUM 20 MG/1
20 TABLET, FILM COATED ORAL DAILY
Qty: 90 TABLET | Refills: 0 | Status: SHIPPED | OUTPATIENT
Start: 2025-07-28 | End: 2025-08-27

## 2025-07-28 RX ORDER — CLOPIDOGREL BISULFATE 75 MG/1
75 TABLET ORAL DAILY
Qty: 90 TABLET | Refills: 0 | Status: SHIPPED | OUTPATIENT
Start: 2025-07-28

## 2025-07-28 RX ORDER — SODIUM BICARBONATE 650 MG/1
650 TABLET ORAL 3 TIMES DAILY
Qty: 270 TABLET | Refills: 0 | Status: SHIPPED | OUTPATIENT
Start: 2025-07-28 | End: 2025-10-26

## 2025-07-28 RX ORDER — CARVEDILOL 12.5 MG/1
12.5 TABLET ORAL 2 TIMES DAILY WITH MEALS
Qty: 180 TABLET | Refills: 0 | Status: SHIPPED | OUTPATIENT
Start: 2025-07-28

## 2025-07-28 RX ORDER — BUSPIRONE HYDROCHLORIDE 5 MG/1
5 TABLET ORAL 2 TIMES DAILY
Qty: 180 TABLET | Refills: 0 | Status: SHIPPED | OUTPATIENT
Start: 2025-07-28 | End: 2025-10-26

## 2025-07-28 RX ORDER — AMLODIPINE BESYLATE 5 MG/1
5 TABLET ORAL DAILY
Qty: 90 TABLET | Refills: 0 | Status: SHIPPED | OUTPATIENT
Start: 2025-07-28

## 2025-07-28 NOTE — PROGRESS NOTES
07/28/2025 1033    ALKPHOS 88 07/28/2025 1033    AST 23 07/28/2025 1033    ALT 26 07/28/2025 1033    BILITOT 0.5 07/28/2025 1033          [unfilled]      IMPRESSION:   Elevated kappa light chain immunoglobulin  Possible MGUS  Chronic renal insufficiency  Status post right nephrectomy in 1982.  Patient donated his kidney to his brother  Multiple comorbidities as listed    PLAN: Records, labs and images were reviewed and discussed with the patient. I explained to the patient the nature of this problem with elevated kappa light chain immunoglobulin and possible underlying cause admission plan.  I discussed with the patient he is probably with chronic renal insufficiency he will continue follow-up with his nephrologist.  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  Explained that elevated kappa light chain can be related to his renal insufficiency but also MGUS is a possibility.  I will do further workup to rule out that possibility.  If confirmed recommendations will be for monitoring we will follow-up labs in 6 months.  Sooner for any problems.  Patient's questions were answered to the best of his satisfaction and he verbalized full understanding and agreement.    I spent a total of 60 minutes on the date of the service which included preparing to see the patient, face-to-face patient care, completing clinical documentation, obtaining and/or reviewing separately obtained history, performing a medically appropriate examination, counseling and educating the patient/family/caregiver, ordering medications, tests, or procedures, communicating with other HCPs (not separately reported), independently interpreting results (not separately reported), communicating results to the patient/family/caregiver and care coordination (not separately reported).                            L[place] MD                          [facility]                            This note is created

## 2025-07-30 LAB
ITYP INTERPRETATION: NORMAL
PATH REV: NORMAL

## 2025-07-31 PROBLEM — I63.9 CEREBROVASCULAR ACCIDENT (CVA) (HCC): Status: RESOLVED | Noted: 2025-07-07 | Resolved: 2025-07-31

## 2025-07-31 PROBLEM — I63.531: Status: RESOLVED | Noted: 2025-07-06 | Resolved: 2025-07-31

## 2025-08-06 PROBLEM — Z90.5 SOLITARY KIDNEY, ACQUIRED: Status: ACTIVE | Noted: 2025-08-06

## 2025-08-08 ENCOUNTER — OFFICE VISIT (OUTPATIENT)
Dept: PRIMARY CARE CLINIC | Age: 75
End: 2025-08-08
Payer: MEDICARE

## 2025-08-08 VITALS
DIASTOLIC BLOOD PRESSURE: 64 MMHG | HEART RATE: 80 BPM | SYSTOLIC BLOOD PRESSURE: 120 MMHG | HEIGHT: 69 IN | WEIGHT: 234.4 LBS | BODY MASS INDEX: 34.72 KG/M2 | OXYGEN SATURATION: 98 % | RESPIRATION RATE: 16 BRPM

## 2025-08-08 DIAGNOSIS — N18.32 TYPE 2 DIABETES MELLITUS WITH STAGE 3B CHRONIC KIDNEY DISEASE, WITH LONG-TERM CURRENT USE OF INSULIN (HCC): ICD-10-CM

## 2025-08-08 DIAGNOSIS — E87.5 HYPERKALEMIA: ICD-10-CM

## 2025-08-08 DIAGNOSIS — Z79.4 TYPE 2 DIABETES MELLITUS WITH STAGE 3B CHRONIC KIDNEY DISEASE, WITH LONG-TERM CURRENT USE OF INSULIN (HCC): ICD-10-CM

## 2025-08-08 DIAGNOSIS — E11.22 TYPE 2 DIABETES MELLITUS WITH STAGE 3B CHRONIC KIDNEY DISEASE, WITH LONG-TERM CURRENT USE OF INSULIN (HCC): ICD-10-CM

## 2025-08-08 DIAGNOSIS — N18.32 STAGE 3B CHRONIC KIDNEY DISEASE (HCC): Primary | ICD-10-CM

## 2025-08-08 DIAGNOSIS — R53.83 FATIGUE, UNSPECIFIED TYPE: ICD-10-CM

## 2025-08-08 PROCEDURE — 1123F ACP DISCUSS/DSCN MKR DOCD: CPT | Performed by: PHYSICIAN ASSISTANT

## 2025-08-08 PROCEDURE — 1159F MED LIST DOCD IN RCRD: CPT | Performed by: PHYSICIAN ASSISTANT

## 2025-08-08 PROCEDURE — 3074F SYST BP LT 130 MM HG: CPT | Performed by: PHYSICIAN ASSISTANT

## 2025-08-08 PROCEDURE — 99214 OFFICE O/P EST MOD 30 MIN: CPT | Performed by: PHYSICIAN ASSISTANT

## 2025-08-08 PROCEDURE — 1160F RVW MEDS BY RX/DR IN RCRD: CPT | Performed by: PHYSICIAN ASSISTANT

## 2025-08-08 PROCEDURE — 3078F DIAST BP <80 MM HG: CPT | Performed by: PHYSICIAN ASSISTANT

## 2025-08-08 PROCEDURE — 3051F HG A1C>EQUAL 7.0%<8.0%: CPT | Performed by: PHYSICIAN ASSISTANT

## 2025-08-08 RX ORDER — ATORVASTATIN CALCIUM 20 MG/1
10 TABLET, FILM COATED ORAL DAILY
Qty: 90 TABLET | Refills: 0 | Status: SHIPPED
Start: 2025-08-08 | End: 2025-09-07

## 2025-08-08 SDOH — ECONOMIC STABILITY: FOOD INSECURITY: WITHIN THE PAST 12 MONTHS, THE FOOD YOU BOUGHT JUST DIDN'T LAST AND YOU DIDN'T HAVE MONEY TO GET MORE.: NEVER TRUE

## 2025-08-08 SDOH — ECONOMIC STABILITY: FOOD INSECURITY: WITHIN THE PAST 12 MONTHS, YOU WORRIED THAT YOUR FOOD WOULD RUN OUT BEFORE YOU GOT MONEY TO BUY MORE.: NEVER TRUE

## 2025-08-08 ASSESSMENT — PATIENT HEALTH QUESTIONNAIRE - PHQ9
2. FEELING DOWN, DEPRESSED OR HOPELESS: NOT AT ALL
SUM OF ALL RESPONSES TO PHQ QUESTIONS 1-9: 0
SUM OF ALL RESPONSES TO PHQ QUESTIONS 1-9: 0
1. LITTLE INTEREST OR PLEASURE IN DOING THINGS: NOT AT ALL
SUM OF ALL RESPONSES TO PHQ QUESTIONS 1-9: 0
SUM OF ALL RESPONSES TO PHQ QUESTIONS 1-9: 0

## 2025-08-26 ASSESSMENT — ENCOUNTER SYMPTOMS
COUGH: 0
SHORTNESS OF BREATH: 0
RHINORRHEA: 0
VOMITING: 0
BACK PAIN: 0
SINUS PAIN: 0
CONSTIPATION: 0
DIARRHEA: 0
NAUSEA: 0
EYE PAIN: 0
ABDOMINAL PAIN: 0

## (undated) DEVICE — STRAP ARMBRD W1.5XL32IN FOAM STR YET SFT W/ HK AND LOOP

## (undated) DEVICE — MEDI-TRACE CADENCE ADULT, DEFIBRILLATION ELECTRODE -RTS  (10 PR/PK) - PHYSIO-CONTROL: Brand: MEDI-TRACE CADENCE

## (undated) DEVICE — SOLUTION IV 0.9% NACL IRRIGATION 3000ML BAG

## (undated) DEVICE — Z DISCONTINUED USE 2859063 SUTURE VICRYL 3-0 L27IN ABSRB UD PSL L30MM 1/2 CIR TAPERPOINT J502H

## (undated) DEVICE — KIT MICRO INTRO 4FR STIFF 40CM NIGHTENALL TUNGSTEN 7SMT

## (undated) DEVICE — SUTURE VICRYL 2-0 L27IN ABSRB CT BRAID COAT UD J275H

## (undated) DEVICE — ELECTRODE PT RET AD L9FT HI MOIST COND ADH HYDRGEL CORDED

## (undated) DEVICE — SPONGE,LAP,18"X18",STD,XR,ST,5/PK,40PK/C: Brand: MEDLINE

## (undated) DEVICE — Device